# Patient Record
Sex: FEMALE | Race: OTHER
[De-identification: names, ages, dates, MRNs, and addresses within clinical notes are randomized per-mention and may not be internally consistent; named-entity substitution may affect disease eponyms.]

---

## 2021-03-27 ENCOUNTER — HOSPITAL ENCOUNTER (EMERGENCY)
Dept: HOSPITAL 8 - ED | Age: 51
Discharge: HOME | End: 2021-03-27
Payer: MEDICAID

## 2021-03-27 VITALS — HEIGHT: 63 IN | WEIGHT: 268.96 LBS | BODY MASS INDEX: 47.66 KG/M2

## 2021-03-27 VITALS — SYSTOLIC BLOOD PRESSURE: 148 MMHG | DIASTOLIC BLOOD PRESSURE: 101 MMHG

## 2021-03-27 DIAGNOSIS — Z72.9: ICD-10-CM

## 2021-03-27 DIAGNOSIS — F15.10: Primary | ICD-10-CM

## 2021-03-27 DIAGNOSIS — R07.89: ICD-10-CM

## 2021-03-27 DIAGNOSIS — E11.9: ICD-10-CM

## 2021-03-27 DIAGNOSIS — R41.82: ICD-10-CM

## 2021-03-27 DIAGNOSIS — I10: ICD-10-CM

## 2021-03-27 DIAGNOSIS — F12.10: ICD-10-CM

## 2021-03-27 LAB
ALBUMIN SERPL-MCNC: 3.5 G/DL (ref 3.4–5)
ALP SERPL-CCNC: 76 U/L (ref 45–117)
ALT SERPL-CCNC: 31 U/L (ref 12–78)
ANION GAP SERPL CALC-SCNC: 6 MMOL/L (ref 5–15)
BASOPHILS # BLD AUTO: 0.1 X10^3/UL (ref 0–0.1)
BASOPHILS NFR BLD AUTO: 1 % (ref 0–1)
BILIRUB SERPL-MCNC: 0.3 MG/DL (ref 0.2–1)
CALCIUM SERPL-MCNC: 8.4 MG/DL (ref 8.5–10.1)
CHLORIDE SERPL-SCNC: 108 MMOL/L (ref 98–107)
CREAT SERPL-MCNC: 1.23 MG/DL (ref 0.55–1.02)
EOSINOPHIL # BLD AUTO: 0.3 X10^3/UL (ref 0–0.4)
EOSINOPHIL NFR BLD AUTO: 3 % (ref 1–7)
ERYTHROCYTE [DISTWIDTH] IN BLOOD BY AUTOMATED COUNT: 14 % (ref 9.6–15.2)
LYMPHOCYTES # BLD AUTO: 3.4 X10^3/UL (ref 1–3.4)
LYMPHOCYTES NFR BLD AUTO: 32 % (ref 22–44)
MCH RBC QN AUTO: 30.6 PG (ref 27–34.8)
MCHC RBC AUTO-ENTMCNC: 33.6 G/DL (ref 32.4–35.8)
MD: NO
MONOCYTES # BLD AUTO: 0.9 X10^3/UL (ref 0.2–0.8)
MONOCYTES NFR BLD AUTO: 8 % (ref 2–9)
NEUTROPHILS # BLD AUTO: 5.9 X10^3/UL (ref 1.8–6.8)
NEUTROPHILS NFR BLD AUTO: 56 % (ref 42–75)
PLATELET # BLD AUTO: 234 X10^3/UL (ref 130–400)
PMV BLD AUTO: 10.9 FL (ref 7.4–10.4)
PROT SERPL-MCNC: 7.6 G/DL (ref 6.4–8.2)
RBC # BLD AUTO: 4.56 X10^6/UL (ref 3.82–5.3)
TROPONIN I SERPL-MCNC: < 0.015 NG/ML (ref 0–0.04)

## 2021-03-27 PROCEDURE — 84484 ASSAY OF TROPONIN QUANT: CPT

## 2021-03-27 PROCEDURE — 96360 HYDRATION IV INFUSION INIT: CPT

## 2021-03-27 PROCEDURE — 93005 ELECTROCARDIOGRAM TRACING: CPT

## 2021-03-27 PROCEDURE — 71045 X-RAY EXAM CHEST 1 VIEW: CPT

## 2021-03-27 PROCEDURE — 96361 HYDRATE IV INFUSION ADD-ON: CPT

## 2021-03-27 PROCEDURE — 99285 EMERGENCY DEPT VISIT HI MDM: CPT

## 2021-03-27 PROCEDURE — 82962 GLUCOSE BLOOD TEST: CPT

## 2021-03-27 PROCEDURE — 85025 COMPLETE CBC W/AUTO DIFF WBC: CPT

## 2021-03-27 PROCEDURE — G0480 DRUG TEST DEF 1-7 CLASSES: HCPCS

## 2021-03-27 PROCEDURE — 36415 COLL VENOUS BLD VENIPUNCTURE: CPT

## 2021-03-27 PROCEDURE — 80320 DRUG SCREEN QUANTALCOHOLS: CPT

## 2021-03-27 PROCEDURE — 70450 CT HEAD/BRAIN W/O DYE: CPT

## 2021-03-27 PROCEDURE — 80307 DRUG TEST PRSMV CHEM ANLYZR: CPT

## 2021-03-27 PROCEDURE — 80053 COMPREHEN METABOLIC PANEL: CPT

## 2021-03-27 NOTE — NUR
PT SITTING UPRIGHT ON GURNEY, RESTING COMFORTABLY WITH EYES CLOSED. NORA, SERA. 
PT UNABLE TO PROVIDE URINE SAMPLE AT THIS TIME. PT DENIES ANY NEEDS. CALL LIGHT 
AND BELONGINGS WITHIN REACH.

## 2021-03-27 NOTE — NUR
URINE SAMPLE PROVIDED. SAMPLE WALKED TO LAB. PT DENIES ANY ADDITIONAL NEEDS. 
ERP AT BEDSIDE FOR RE-EVAL. WILL OCNTINUE TO MONITOR.

## 2021-03-27 NOTE — NUR
Patient given discharge instructions and they have confirmed that they 
understand the instructions.  Patient ambulatory with steady gait. Pt to family 
vehicle via wheelchair.

## 2021-03-27 NOTE — NUR
PURE WICK PLACED PER PT REQUEST. PT DENIES ANY ADDITIONAL NEEDS AT THIS TIME. 
CALL LIGHT AND BELONGINGS WITHIN REACH.

## 2021-04-27 ENCOUNTER — HOSPITAL ENCOUNTER (OUTPATIENT)
Dept: CARDIOLOGY | Facility: MEDICAL CENTER | Age: 51
End: 2021-04-27
Attending: FAMILY MEDICINE
Payer: MEDICAID

## 2021-04-27 ENCOUNTER — HOSPITAL ENCOUNTER (OUTPATIENT)
Dept: RADIOLOGY | Facility: MEDICAL CENTER | Age: 51
End: 2021-04-27
Attending: FAMILY MEDICINE
Payer: MEDICAID

## 2021-04-27 DIAGNOSIS — R47.81 SLURRED SPEECH: ICD-10-CM

## 2021-04-27 DIAGNOSIS — H53.8 BLURRED VISION: ICD-10-CM

## 2021-04-27 LAB
LV EJECT FRACT  99904: 65
LV EJECT FRACT MOD 2C 99903: 57.44
LV EJECT FRACT MOD 4C 99902: 70.66
LV EJECT FRACT MOD BP 99901: 65.01

## 2021-04-27 PROCEDURE — 93880 EXTRACRANIAL BILAT STUDY: CPT | Mod: 26 | Performed by: INTERNAL MEDICINE

## 2021-04-27 PROCEDURE — 93880 EXTRACRANIAL BILAT STUDY: CPT

## 2021-04-27 PROCEDURE — 93306 TTE W/DOPPLER COMPLETE: CPT | Mod: 26 | Performed by: INTERNAL MEDICINE

## 2021-04-27 PROCEDURE — 93306 TTE W/DOPPLER COMPLETE: CPT

## 2022-04-27 ENCOUNTER — OFFICE VISIT (OUTPATIENT)
Dept: MEDICAL GROUP | Facility: OTHER | Age: 52
End: 2022-04-27
Payer: COMMERCIAL

## 2022-04-27 VITALS
OXYGEN SATURATION: 96 % | DIASTOLIC BLOOD PRESSURE: 90 MMHG | TEMPERATURE: 97.6 F | SYSTOLIC BLOOD PRESSURE: 140 MMHG | BODY MASS INDEX: 50.61 KG/M2 | HEART RATE: 76 BPM | WEIGHT: 275 LBS | HEIGHT: 62 IN

## 2022-04-27 DIAGNOSIS — K13.79 MOUTH PAIN: ICD-10-CM

## 2022-04-27 DIAGNOSIS — K02.9 TOOTH CARIES: ICD-10-CM

## 2022-04-27 DIAGNOSIS — G45.9 TIA (TRANSIENT ISCHEMIC ATTACK): ICD-10-CM

## 2022-04-27 DIAGNOSIS — I63.9 STROKE DETERMINED BY CLINICAL ASSESSMENT (HCC): ICD-10-CM

## 2022-04-27 DIAGNOSIS — Z00.00 PREVENTATIVE HEALTH CARE: ICD-10-CM

## 2022-04-27 PROCEDURE — 99214 OFFICE O/P EST MOD 30 MIN: CPT | Performed by: FAMILY MEDICINE

## 2022-04-27 RX ORDER — LISINOPRIL 40 MG/1
TABLET ORAL
COMMUNITY
Start: 2022-04-21 | End: 2022-07-20

## 2022-04-27 RX ORDER — DOXAZOSIN MESYLATE 1 MG/1
1 TABLET ORAL DAILY
COMMUNITY
Start: 2022-03-22 | End: 2022-08-15 | Stop reason: SDUPTHER

## 2022-04-27 RX ORDER — AMLODIPINE BESYLATE 10 MG/1
10 TABLET ORAL
COMMUNITY
Start: 2022-03-22 | End: 2022-08-15 | Stop reason: SDUPTHER

## 2022-04-27 RX ORDER — METFORMIN HYDROCHLORIDE 500 MG/1
500 TABLET, EXTENDED RELEASE ORAL DAILY
COMMUNITY
Start: 2022-03-22 | End: 2022-08-15 | Stop reason: SDUPTHER

## 2022-04-27 RX ORDER — AMOXICILLIN 500 MG/1
500 CAPSULE ORAL 3 TIMES DAILY
Qty: 30 CAPSULE | Refills: 1 | Status: SHIPPED | OUTPATIENT
Start: 2022-04-27 | End: 2022-06-23

## 2022-04-27 ASSESSMENT — ENCOUNTER SYMPTOMS
TINGLING: 0
CHILLS: 0
DIARRHEA: 0
SHORTNESS OF BREATH: 0
FEVER: 0
PALPITATIONS: 0
VOMITING: 0
COUGH: 0
HEARTBURN: 0
FOCAL WEAKNESS: 0
NAUSEA: 0
EYE REDNESS: 0
BRUISES/BLEEDS EASILY: 0
SORE THROAT: 0

## 2022-04-27 ASSESSMENT — PATIENT HEALTH QUESTIONNAIRE - PHQ9: CLINICAL INTERPRETATION OF PHQ2 SCORE: 0

## 2022-04-27 NOTE — ASSESSMENT & PLAN NOTE
Patient will get her hep B vaccine her pneumococcal vaccine her COVID booster.  Will advise he also states she needs MMR and chickenpox as she does not think she has had chickenpox in the past.  She also needs a follow-up with provider for mammogram and Pap smear

## 2022-04-27 NOTE — ASSESSMENT & PLAN NOTE
Exam shows caries almost every tooth.  Tooth in question is broken.  Root is exposed.  Teeth surrounding are loose.  No abscess that I could palpate.  Gum was not tender.  No redness.  Will prescribe amoxicillin 500 mg 3 times daily.  Antibiotic side effects discussed with patient.  Tolerated in the past.  No known allergies.  Gave her 30 enough for 10 days and 1 refill because her appointments not till June in case her pain comes back.

## 2022-04-27 NOTE — ASSESSMENT & PLAN NOTE
MRI brain as above.  Smokes marijuana and occasional meth.  Recommend that she stop this practice.

## 2022-04-27 NOTE — PROGRESS NOTES
Subjective:   CC:   Chief Complaint   Patient presents with   • Oral Swelling       HPI: Isa is a 51 y.o. female who presents today for the following problems:    Problem   Mouth Pain    Isa is here for mouth pain.  Left incisor front.  Broken off and she is getting gum and tooth pain.  She does not have appoint with the dentist until June.  In the past amoxicillin has helped her.  She is posterior all of her teeth pulled and get dentures.  History of meth use.     Tia (Transient Ischemic Attack)    History of TIA last fall.  CT head negative ultrasound carotids negative was given MRI order a brain by Dr. Ybarra but she did not ever get called.  She would like to do this test.     Stroke Determined By Clinical Assessment ([facility])    See discussion above     Preventative Health Care    Patient will get her hep B vaccine her pneumococcal vaccine her COVID booster.  Will advise he also states she needs MMR and chickenpox as she does not think she has had chickenpox in the past.  She also needs a follow-up with provider for mammogram and Pap smear         Current Outpatient Medications   Medication Sig Dispense Refill   • amLODIPine (NORVASC) 10 MG Tab Take 10 mg by mouth every day.     • doxazosin (CARDURA) 1 MG Tab Take 1 mg by mouth every day.     • lisinopril (PRINIVIL) 40 MG tablet      • metFORMIN ER (GLUCOPHAGE XR) 500 MG TABLET SR 24 HR Take 500 mg by mouth every day.     • amoxicillin (AMOXIL) 500 MG Cap Take 1 Capsule by mouth 3 times a day. 30 Capsule 1   • levothyroxine (SYNTHROID) 150 MCG TABS Take 1 Tab by mouth Every morning on an empty stomach. 30 Tab 3   • amlodipine (NORVASC) 5 MG TABS Take 1 Tab by mouth every day. (Patient not taking: Reported on 4/27/2022) 30 Tab 11   • metoprolol (LOPRESSOR) 25 MG TABS Take 1 Tab by mouth 2 Times a Day. (Patient not taking: Reported on 4/27/2022) 60 Tab 11   • silver sulfADIAZINE (SILVADENE) 1 % CREA Apply a thin layer to the affected area twice per day. (Patient  "not taking: Reported on 4/27/2022) 15 g 0   • tramadol (ULTRAM) 50 MG TABS Take 1-2 Tabs by mouth every four hours as needed. (Patient not taking: Reported on 4/27/2022) 30 Tab 0     No current facility-administered medications for this visit.       Social History     Tobacco Use   • Smoking status: Current Every Day Smoker   • Tobacco comment: rarely 1cigarette every 3 weeks   Substance Use Topics   • Alcohol use: No     Comment: quit 2009   • Drug use: Yes     Comment: quit 4/10 meth sober x 1 yr       Review of Systems   Constitutional: Negative for chills and fever.   HENT: Negative for sore throat.    Eyes: Negative for redness.   Respiratory: Negative for cough and shortness of breath.    Cardiovascular: Negative for chest pain and palpitations.   Gastrointestinal: Negative for diarrhea, heartburn, nausea and vomiting.   Skin: Negative for rash.   Neurological: Negative for tingling and focal weakness.   Endo/Heme/Allergies: Does not bruise/bleed easily.       Objective:     Vitals:    04/27/22 0812   BP: 140/90   BP Location: Right arm   Patient Position: Sitting   Pulse: 76   Temp: 36.4 °C (97.6 °F)   SpO2: 96%   Weight: 125 kg (275 lb)   Height: 1.575 m (5' 2\")     Body mass index is 50.3 kg/m².     Physical Exam:   Gen: Well developed, well nourished in no acute distress.   Skin: Pink, warm, and dry  HEENT: conjunctiva non-injected, sclera non-icteric. EOMs intact.   No facial tenderness  Oral mucous membranes pink and moist with no lesions.  Neck: Supple, trachea midline. No adenopathy or masses in the neck or supraclavicular regions.  Ext: no edema, color normal, vascularity normal, temperature normal  Alert and oriented Eye contact is good, speech goal directed, affect calm  Neuro no specific deficits.  Prior had slurred speech her speech is normal today.  Prior had blurred vision but her vision appears normal to her today.    Assessment & Plan:   Mouth pain  Exam shows caries almost every tooth.  Tooth " in question is broken.  Root is exposed.  Teeth surrounding are loose.  No abscess that I could palpate.  Gum was not tender.  No redness.  Will prescribe amoxicillin 500 mg 3 times daily.  Antibiotic side effects discussed with patient.  Tolerated in the past.  No known allergies.  Gave her 30 enough for 10 days and 1 refill because her appointments not till June in case her pain comes back.    TIA (transient ischemic attack)  Eye doctor told her that she had a stroke.  Will order MRI scan.  Brain without contrast.    Stroke determined by clinical assessment ([facility])  MRI brain as above.  Smokes marijuana and occasional meth.  Recommend that she stop this practice.    Preventative health care  Patient will get her hep B vaccine her pneumococcal vaccine her COVID booster.  Will advise he also states she needs MMR and chickenpox as she does not think she has had chickenpox in the past.  She also needs a follow-up with provider for mammogram and Pap smear      Followup: Return if symptoms worsen or fail to improve.    Taiwo Malone M.D.    Please note that this dictation was created using voice recognition software. I have made every reasonable attempt to correct obvious errors, but I expect that there are errors of grammar and possibly content that I did not discover before finalizing the note.

## 2022-06-23 ENCOUNTER — OFFICE VISIT (OUTPATIENT)
Dept: MEDICAL GROUP | Facility: OTHER | Age: 52
End: 2022-06-23
Payer: COMMERCIAL

## 2022-06-23 VITALS
SYSTOLIC BLOOD PRESSURE: 110 MMHG | HEART RATE: 77 BPM | BODY MASS INDEX: 49.39 KG/M2 | RESPIRATION RATE: 16 BRPM | OXYGEN SATURATION: 97 % | DIASTOLIC BLOOD PRESSURE: 78 MMHG | TEMPERATURE: 96.9 F | HEIGHT: 62 IN | WEIGHT: 268.4 LBS

## 2022-06-23 DIAGNOSIS — E11.21 TYPE 2 DIABETES MELLITUS WITH NEPHROPATHY (HCC): ICD-10-CM

## 2022-06-23 DIAGNOSIS — I10 PRIMARY HYPERTENSION: ICD-10-CM

## 2022-06-23 DIAGNOSIS — M25.562 ACUTE PAIN OF LEFT KNEE: ICD-10-CM

## 2022-06-23 DIAGNOSIS — E03.9 HYPOTHYROIDISM, UNSPECIFIED TYPE: ICD-10-CM

## 2022-06-23 PROBLEM — R42 VERTIGO: Status: ACTIVE | Noted: 2018-08-02

## 2022-06-23 PROBLEM — F19.10 SUBSTANCE ABUSE (HCC): Status: ACTIVE | Noted: 2021-04-19

## 2022-06-23 PROBLEM — S40.852A SUPERFICIAL FOREIGN BODY OF LEFT UPPER ARM: Status: ACTIVE | Noted: 2020-08-11

## 2022-06-23 PROBLEM — B35.1 ONYCHOMYCOSIS: Status: ACTIVE | Noted: 2017-07-20

## 2022-06-23 PROBLEM — H53.8 BLURRING OF VISUAL IMAGE: Status: ACTIVE | Noted: 2021-04-19

## 2022-06-23 PROBLEM — N18.30 CHRONIC KIDNEY DISEASE, STAGE III (MODERATE): Status: ACTIVE | Noted: 2018-05-03

## 2022-06-23 PROBLEM — F41.8 ANXIETY ASSOCIATED WITH DEPRESSION: Status: ACTIVE | Noted: 2018-05-03

## 2022-06-23 PROBLEM — K02.9 DENTAL CARIES: Status: ACTIVE | Noted: 2017-07-28

## 2022-06-23 PROBLEM — R73.03 PREDIABETES: Status: ACTIVE | Noted: 2018-05-03

## 2022-06-23 PROCEDURE — 99214 OFFICE O/P EST MOD 30 MIN: CPT | Performed by: FAMILY MEDICINE

## 2022-06-23 ASSESSMENT — PAIN SCALES - GENERAL: PAINLEVEL: 4=SLIGHT-MODERATE PAIN

## 2022-06-23 NOTE — ASSESSMENT & PLAN NOTE
Tylenol 500 m tabs three times daily.   PT ordered for knee pain.    Recheck with Dr Bender 6-8 weeks.

## 2022-06-23 NOTE — PROGRESS NOTES
[facility]     PATIENT ID:  NAME:  Isa Espinoza  MRN:               [id number]  YOB: 1970    Date: 10:29 AM      CC:  Left knee pain      HPI: Isa Espinoza is a 51 y.o. female who presented with one month of knee pain and for follow-up on several chronic issues including HTN, Diabetes, and thyroid.     Problem   Acute Pain of Left Knee    She has had left knee pain for about a month.  She does not recall what happened to it; no known trauma or injury reported.  Has not taken anything for this.  Has pain with turning or twisting at knee.  No prior knee surgery.      Blurring of Visual Image   Substance Abuse (Hcc)   Hypothyroidism    Takes thyroid daily.  No recent labs.      Superficial Foreign Body of Left Upper Arm   Type 2 Diabetes Mellitus With Nephropathy (Hcc)    Has had Diabetes for quite a while.  Takes Metformin ER once daily.  No recent labs.      Vertigo   Anxiety Associated With Depression   Chronic Kidney Disease, Stage III (Moderate) ([facility])   Prediabetes   Dental Caries   Onychomycosis   Preventative Health Care    Patient will get her hep B vaccine her pneumococcal vaccine her COVID booster.  Will advise he also states she needs MMR and chickenpox as she does not think she has had chickenpox in the past.  She also needs a follow-up with provider for mammogram and Pap smear     Htn (Hypertension)    Taking Lisinopril, Amlodipine and Doxazosin for her blood pressure.      Thyroid Disorder (Resolved)       REVIEW OF SYSTEMS:   Ten systems reviewed and were negative except as noted in the HPI.                PROBLEM LIST  Patient Active Problem List   Diagnosis   • Hypokalemia   • HTN (hypertension)   • Medical non-compliance   • Mouth pain   • TIA (transient ischemic attack)   • Stroke determined by clinical assessment ([facility])   • Preventative health care   • Anxiety associated with depression   • Blurring of visual image   • Chronic kidney disease, stage III (moderate) ([facility])   •  "Dental caries   • Hypothyroidism   • Onychomycosis   • Prediabetes   • Substance abuse (HCC)   • Superficial foreign body of left upper arm   • Type 2 diabetes mellitus with nephropathy (HCC)   • Vertigo   • Acute pain of left knee        PAST SURGICAL HISTORY:  History reviewed. No pertinent surgical history.    FAMILY HISTORY:  History reviewed. No pertinent family history.    SOCIAL HISTORY:   Social History     Tobacco Use   • Smoking status: Current Every Day Smoker     Packs/day: 0.25     Years: 29.00     Pack years: 7.25     Types: Cigarettes   • Smokeless tobacco: Former User   • Tobacco comment: tried it chewing tobacco but never continued use   Substance Use Topics   • Alcohol use: Yes     Comment: on special occasions       ALLERGIES:  Allergies   Allergen Reactions   • No Known Drug Allergy        OUTPATIENT MEDICATIONS:    Current Outpatient Medications:   •  amLODIPine (NORVASC) 10 MG Tab, Take 10 mg by mouth every day., Disp: , Rfl:   •  doxazosin (CARDURA) 1 MG Tab, Take 1 mg by mouth every day., Disp: , Rfl:   •  lisinopril (PRINIVIL) 40 MG tablet, , Disp: , Rfl:   •  metFORMIN ER (GLUCOPHAGE XR) 500 MG TABLET SR 24 HR, Take 500 mg by mouth every day., Disp: , Rfl:   •  levothyroxine (SYNTHROID) 150 MCG TABS, Take 1 Tab by mouth Every morning on an empty stomach., Disp: 30 Tab, Rfl: 3    PHYSICAL EXAM:  Vitals:    06/23/22 0937   BP: 110/78   BP Location: Right arm   Patient Position: Sitting   BP Cuff Size: Large adult   Pulse: 77   Resp: 16   Temp: 36.1 °C (96.9 °F)   TempSrc: Temporal   SpO2: 97%   Weight: 122 kg (268 lb 6.4 oz)   Height: 1.575 m (5' 2\")       General: Pt resting in NAD, cooperative; overweight.   Extremities:  Full range of motion.  Musculoskeletal: No swelling or obvious effusion noted; no erythema; she has tenderness inferior to patellar area and medial; no crepitus with PROM. Lachman's sign absent; has tenderness with stressing medial collateral ligament and none with " stressing lateral collateral ligaments.    CNS:  Muscle tone is normal. No gross focal neurologic deficits      ASSESSMENT/PLAN:   51 y.o. female     Problem List Items Addressed This Visit     Acute pain of left knee     Tylenol 500 m tabs three times daily.   PT ordered for knee pain.    Recheck with Dr Bender 6-8 weeks.             Relevant Orders    Referral to Physical Therapy    HTN (hypertension)     BP is controlled.   Continue current medication regimen.   Labs ordered.            Hypothyroidism     Labs ordered: TSH, Free T4.    Continue daily thyroid hormone.            Type 2 diabetes mellitus with nephropathy (HCC)     Labs ordered.  Ophthalmology referral placed.   Continue Metformin   Mg daily for now.                  Eduin Gil MD  [facility]

## 2022-06-23 NOTE — PATIENT INSTRUCTIONS
Tylenol Extra Strength (Acetaminophen) 500 mg: take 2 tabs three times a day for pain.    Physical therapy ordered.     Recheck about 2 months Dr Bender, Ph# [phone]

## 2022-07-09 LAB — HBA1C MFR BLD: 6.7 % (ref 0–5.6)

## 2022-07-20 RX ORDER — LISINOPRIL 40 MG/1
TABLET ORAL
Qty: 90 TABLET | Refills: 1 | Status: SHIPPED | OUTPATIENT
Start: 2022-07-20 | End: 2022-08-15 | Stop reason: SDUPTHER

## 2022-08-15 ENCOUNTER — HOSPITAL ENCOUNTER (OUTPATIENT)
Dept: RADIOLOGY | Facility: MEDICAL CENTER | Age: 52
End: 2022-08-15
Attending: FAMILY MEDICINE
Payer: COMMERCIAL

## 2022-08-15 DIAGNOSIS — I63.9 CEREBROVASCULAR ACCIDENT (CVA), UNSPECIFIED MECHANISM (HCC): ICD-10-CM

## 2022-08-15 DIAGNOSIS — I63.9 STROKE DETERMINED BY CLINICAL ASSESSMENT (HCC): ICD-10-CM

## 2022-08-15 PROCEDURE — 70551 MRI BRAIN STEM W/O DYE: CPT

## 2022-08-15 RX ORDER — METFORMIN HYDROCHLORIDE 500 MG/1
500 TABLET, EXTENDED RELEASE ORAL DAILY
Qty: 30 TABLET | Refills: 0 | Status: SHIPPED | OUTPATIENT
Start: 2022-08-15 | End: 2023-02-16 | Stop reason: SDUPTHER

## 2022-08-15 RX ORDER — AMLODIPINE BESYLATE 10 MG/1
10 TABLET ORAL
Qty: 30 TABLET | Refills: 0 | Status: SHIPPED | OUTPATIENT
Start: 2022-08-15 | End: 2023-02-16

## 2022-08-15 RX ORDER — LISINOPRIL 40 MG/1
40 TABLET ORAL DAILY
Qty: 30 TABLET | Refills: 0 | Status: SHIPPED | OUTPATIENT
Start: 2022-08-15 | End: 2023-02-16

## 2022-08-15 RX ORDER — DOXAZOSIN MESYLATE 1 MG/1
1 TABLET ORAL DAILY
Qty: 30 TABLET | Refills: 0 | Status: SHIPPED | OUTPATIENT
Start: 2022-08-15 | End: 2023-03-19

## 2022-08-15 RX ORDER — LEVOTHYROXINE SODIUM 0.15 MG/1
150 TABLET ORAL
Qty: 30 TABLET | Refills: 0 | Status: SHIPPED | OUTPATIENT
Start: 2022-08-15 | End: 2023-02-16 | Stop reason: SDUPTHER

## 2022-08-16 NOTE — TELEPHONE ENCOUNTER
Spoke with patient gave her results regarding her MRI. She stated she will schedule appointment with provider to follow up for labs and long term medications.

## 2022-12-15 ENCOUNTER — APPOINTMENT (OUTPATIENT)
Dept: INTERNAL MEDICINE | Facility: OTHER | Age: 52
End: 2022-12-15
Payer: COMMERCIAL

## 2022-12-29 ENCOUNTER — APPOINTMENT (OUTPATIENT)
Dept: INTERNAL MEDICINE | Facility: OTHER | Age: 52
End: 2022-12-29
Payer: COMMERCIAL

## 2023-01-10 PROBLEM — R42 VERTIGO: Status: RESOLVED | Noted: 2018-08-02 | Resolved: 2023-01-10

## 2023-01-10 PROBLEM — B35.1 ONYCHOMYCOSIS: Status: RESOLVED | Noted: 2017-07-20 | Resolved: 2023-01-10

## 2023-01-10 PROBLEM — S40.852A SUPERFICIAL FOREIGN BODY OF LEFT UPPER ARM: Status: RESOLVED | Noted: 2020-08-11 | Resolved: 2023-01-10

## 2023-01-10 PROBLEM — K13.79 MOUTH PAIN: Status: RESOLVED | Noted: 2022-04-27 | Resolved: 2023-01-10

## 2023-01-10 PROBLEM — F41.8 ANXIETY ASSOCIATED WITH DEPRESSION: Status: RESOLVED | Noted: 2018-05-03 | Resolved: 2023-01-10

## 2023-01-10 PROBLEM — I63.9 STROKE DETERMINED BY CLINICAL ASSESSMENT (HCC): Status: RESOLVED | Noted: 2022-04-27 | Resolved: 2023-01-10

## 2023-01-10 PROBLEM — M25.562 ACUTE PAIN OF LEFT KNEE: Status: RESOLVED | Noted: 2022-06-23 | Resolved: 2023-01-10

## 2023-01-10 PROBLEM — E66.9 OBESITY WITH BODY MASS INDEX 30 OR GREATER: Status: ACTIVE | Noted: 2017-07-13

## 2023-01-10 PROBLEM — H53.8 BLURRING OF VISUAL IMAGE: Status: RESOLVED | Noted: 2021-04-19 | Resolved: 2023-01-10

## 2023-01-10 PROBLEM — K02.9 DENTAL CARIES: Status: RESOLVED | Noted: 2017-07-28 | Resolved: 2023-01-10

## 2023-01-25 ENCOUNTER — APPOINTMENT (OUTPATIENT)
Dept: INTERNAL MEDICINE | Facility: OTHER | Age: 53
End: 2023-01-25
Payer: COMMERCIAL

## 2023-02-16 ENCOUNTER — OFFICE VISIT (OUTPATIENT)
Dept: INTERNAL MEDICINE | Facility: OTHER | Age: 53
End: 2023-02-16
Payer: COMMERCIAL

## 2023-02-16 VITALS
HEIGHT: 63 IN | TEMPERATURE: 97.4 F | SYSTOLIC BLOOD PRESSURE: 166 MMHG | WEIGHT: 263.4 LBS | BODY MASS INDEX: 46.67 KG/M2 | HEART RATE: 72 BPM | DIASTOLIC BLOOD PRESSURE: 94 MMHG | OXYGEN SATURATION: 97 %

## 2023-02-16 DIAGNOSIS — I10 PRIMARY HYPERTENSION: ICD-10-CM

## 2023-02-16 DIAGNOSIS — Z12.11 SCREENING FOR COLON CANCER: ICD-10-CM

## 2023-02-16 DIAGNOSIS — R06.83 SNORING: ICD-10-CM

## 2023-02-16 DIAGNOSIS — N28.9 RENAL INSUFFICIENCY: ICD-10-CM

## 2023-02-16 DIAGNOSIS — Z12.31 ENCOUNTER FOR SCREENING MAMMOGRAM FOR BREAST CANCER: ICD-10-CM

## 2023-02-16 DIAGNOSIS — E03.9 HYPOTHYROIDISM, UNSPECIFIED TYPE: ICD-10-CM

## 2023-02-16 DIAGNOSIS — I63.9 CEREBROVASCULAR ACCIDENT (CVA), UNSPECIFIED MECHANISM (HCC): ICD-10-CM

## 2023-02-16 DIAGNOSIS — R73.03 PREDIABETES: ICD-10-CM

## 2023-02-16 PROCEDURE — 99214 OFFICE O/P EST MOD 30 MIN: CPT | Performed by: INTERNAL MEDICINE

## 2023-02-16 RX ORDER — METFORMIN HYDROCHLORIDE 500 MG/1
500 TABLET, EXTENDED RELEASE ORAL DAILY
Qty: 30 TABLET | Refills: 0 | Status: SHIPPED | OUTPATIENT
Start: 2023-02-16 | End: 2023-03-19

## 2023-02-16 RX ORDER — AMLODIPINE BESYLATE 5 MG/1
5 TABLET ORAL DAILY
Qty: 30 TABLET | Refills: 1 | Status: SHIPPED | OUTPATIENT
Start: 2023-02-16 | End: 2023-03-22

## 2023-02-16 RX ORDER — ASPIRIN 81 MG/1
81 TABLET, CHEWABLE ORAL DAILY
Qty: 100 TABLET | Refills: 1 | Status: SHIPPED | OUTPATIENT
Start: 2023-02-16 | End: 2023-09-01 | Stop reason: SDUPTHER

## 2023-02-16 RX ORDER — LISINOPRIL 10 MG/1
10 TABLET ORAL DAILY
Qty: 30 TABLET | Refills: 1 | Status: SHIPPED | OUTPATIENT
Start: 2023-02-16 | End: 2023-03-22

## 2023-02-16 RX ORDER — LEVOTHYROXINE SODIUM 0.15 MG/1
150 TABLET ORAL
Qty: 30 TABLET | Refills: 0 | Status: SHIPPED | OUTPATIENT
Start: 2023-02-16 | End: 2023-03-24

## 2023-02-16 ASSESSMENT — FIBROSIS 4 INDEX: FIB4 SCORE: 0.69

## 2023-02-16 NOTE — PROGRESS NOTES
Chief Complaint   Patient presents with    New Patient     Re-est    Hypertension    Hypothyroidism    Diabetes    Medication Refill     Have not taken any medications for 4 months        HPI: Isa Espinoza is a 52 y.o. female with past medical history as below who presented to the clinic fto establish and for the following     *Hypertension - was on lisinopril 40 mg , doxazocin 1mg , amlodipine 10 mg, ran out of medications 2 months ago and has not been on any meds for blood pressure for the same duration. Has history of CVA. Denies any chest pain, palpitations. States that she feels short of breath now and then which she attributes to her weight. Has vision changes secondary to her CVA/other- seeing ophthalmologist.    *History of CVA with substance use at the time   -TIA like symptoms happened  - 2 years ago- when she had slurry speech, vision changes on one eye, was emotional, seen at [facility] with resolution of symptoms in a few hours with recommendation to follow up with \[facility]\"" clinic which she did not do at the time due to resolution of symptoms and then forgot about it. Patient was smoking cannabis and also methamphetamine at the time. Patient recently had an eye exam done at [facility] and was told that she has vision changes suggestive of a stroke for which an MRI brain was performed in August 2022 which confirmed the presence of a  old left temporal occipital stroke with chronic microvascular ischemic changes.   Patient currently states that she has worse vision on left eye, however doing well with glasses both for near and far sightedness ( 2 separate glasses) and still drives.   Echo cardiogram in April 2021 showed trace mR, AI and Trace TR     *Substance use disorder   -Quit methamphetamine 1 month ago because she needs dental procedures done which requires abstinence.   -Currently smoking marijuana every other day  -States that she smokes a few cigarettes a month    *Hypothyroidism    -TSH of 26 in 2015, has not taken any medications for 2 months, was on levothyroxine 150 mcg.   -States that she and constipation issues in the past which had briefly resolved and now she again as problems with constipation.  Weight has been up and down. Did not discuss regarding menstrual cycles on this visit.     *Prediabetes vs Type 2DM?  -Unable to find A1C in diabetic range on chart , both problems listed on chart . Keeping problem on chart for now.  - Aic of 6.7 in jUly 2022 , metformin 500 SR  - positive for nocturia, at least 6 times a night, urinary frequency not so much during the day.    *Renal insufficiency   -CKD stage 3 on chart , deleted problem as we do not have confirmation that it is chronic   -Seen on latest labs from 2021,GFR 46.6,  all normal prior to that on our EMR  -Nocturia, however denies any other urinary symptoms.         Diet- mostly home cooked meals however does eat greasy foods.   Open to get shingles and also colonoscopy     Family History   Problem Relation Age of Onset    Stroke Mother         Patient states taht mother was way older than when she or her sister had stroked    Cancer Mother         States that it was somewhere near stomach which had already spread and she went to hospice upon diagnosis    Stroke Father     Stroke Sister 45        Paralysed neck below    No Known Problems Paternal Aunt       Social History     Socioeconomic History    Marital status:    Tobacco Use    Smoking status: Some Days     Packs/day: 0.25     Years: 29.00     Pack years: 7.25     Types: Cigarettes    Smokeless tobacco: Former    Tobacco comments:     tried it chewing tobacco but never continued use   Vaping Use    Vaping Use: Never used   Substance and Sexual Activity    Alcohol use: Not Currently     Comment: on special occasions    Drug use: Yes     Types: Marijuana, Methamphetamines          Patient Active Problem List    Diagnosis Date Noted    Renal insufficiency 02/19/2023     CVA (cerebral vascular accident) ([facility]) 04/27/2022    Substance abuse ([facility]) 04/19/2021    Hypothyroidism 08/11/2020    Type 2 diabetes mellitus with nephropathy ([facility]) 08/11/2020    Prediabetes 05/03/2018    Obesity with body mass index 30 or greater 07/13/2017    HTN (hypertension) 04/17/2015       Current Outpatient Medications   Medication Sig Dispense Refill    amLODIPine (NORVASC) 5 MG Tab Take 1 Tablet by mouth every day. 30 Tablet 1    lisinopril (PRINIVIL) 10 MG Tab Take 1 Tablet by mouth every day. 30 Tablet 1    aspirin (ASA) 81 MG Chew Tab chewable tablet Chew 1 Tablet every day. 100 Tablet 1    levothyroxine (SYNTHROID) 150 MCG Tab Take 1 Tablet by mouth every morning on an empty stomach. 30 Tablet 0    metFORMIN ER (GLUCOPHAGE XR) 500 MG TABLET SR 24 HR Take 1 Tablet by mouth every day. 30 Tablet 0    doxazosin (CARDURA) 1 MG Tab Take 1 Tablet by mouth every day. (Patient not taking: Reported on 2/16/2023) 30 Tablet 0     No current facility-administered medications for this visit.       ROS: As per HPI. Additional pertinent systems as noted below.  Constitutional:  Negative for fever, chills   HENT:  Negative for ear pain, sore throat, runny nose   Eyes:  As in HPI  Cardiovascular:  Negative for chest pain, palpitations   Respiratory:  Negative for cough, sputum production, Positive for intermittent SOB  Gastrointestinal: Negative for abdominal pain, nausea, vomiting, , or blood in stools . Positive for constipation, Positive for diarrhea when she eats chilli  Genitourinary: Negative for dysuria, flank pain and hematuria. Positive for nocturia  Extremities: Negative for leg swelling   Neurologic: Negative for headaches, dizziness, seizures, weakness . Positive for blurry vision as in hpi  Endo/Heme/Allergies: Negative for polydipsia. Positive for nocturia      BP (!) 166/94 (BP Location: Left arm, Patient Position: Sitting, BP Cuff Size: Large adult)   Pulse 72   Temp 36.3 °C (97.4 °F) (Temporal)   Ht  "1.6 m (5' 3\")   Wt 119 kg (263 lb 6.4 oz)   SpO2 97%   BMI 46.66 kg/m²     Physical Exam   Constitutional:  Well developed, well nourished. Not in acute distress   HENT:  Normocephalic, Atraumatic, Oropharynx is pink and moist. No oral exudates, Nose normal. Poor oral dentition    Eyes:  EOMI, Conjunctiva normal, No discharge. PERRLA    Cardiovascular:  Regular rate and rhythm, No pedal edema, Intact distal pulses   Respiratory: Clear to auscultation bilaterally, No use of accessory muscles    Neurologic: Alert & oriented x 4,     Note: I have reviewed all pertinent labs and diagnostic tests associated with this visit with specific comments listed under the assessment and plan below    Assessment and Plan    1. Primary hypertension  - Advised patient to keep a log of blood pressures at home and bring on next visit in 1 week.   DASH diet, decrease salt, incorporating some exercise.   -I am not restarting all three meds at the same dose she was on previously, given that she was off of meds for 2 months. I am instead starting amlodipine and lisinopril at lower doses.   -Discontinuing doxazocin   -Follow up in 1 week with labs to up titrate meds  -May need sleep study- to be discussed on next visit.   -Restart levothyroxine as this may be a secondary cause contributing to hypertension.       - Lipid Profile; Future  - amLODIPine (NORVASC) 5 MG Tab; Take 1 Tablet by mouth every day.  Dispense: 30 Tablet; Refill: 1  - lisinopril (PRINIVIL) 10 MG Tab; Take 1 Tablet by mouth every day.  Dispense: 30 Tablet; Refill: 1  - CBC WITH DIFFERENTIAL; Future  - Comp Metabolic Panel; Future    2. Cerebrovascular accident (CVA), unspecified mechanism (HCC)  -May have been secondary to stimulant use at the time.   -However cannot rule out atherosclerotic causes given family history and MRI finding of Microvascular ischemia and given her risk factors including obesity, possible diabetes/prediabetes, hypertension, intermittent " cigarette smoking, hence will start aspirin 81 mg. Will wait for lipid panel, prior to starting statin    - Lipid Profile; Future  - aspirin (ASA) 81 MG Chew Tab chewable tablet; Chew 1 Tablet every day.  Dispense: 100 Tablet; Refill: 1  - Comp Metabolic Panel; Future    3. Hypothyroidism, unspecified type    - levothyroxine (SYNTHROID) 150 MCG Tab; Take 1 Tablet by mouth every morning on an empty stomach.  Dispense: 30 Tablet; Refill: 0  - TSH+FREE T4    4. Prediabetes  Do not find A1C confirming diabetes on chart- however problem listed   - Lipid Profile; Future  - metFORMIN ER (GLUCOPHAGE XR) 500 MG TABLET SR 24 HR; Take 1 Tablet by mouth every day.  Dispense: 30 Tablet; Refill: 0  - Comp Metabolic Panel; Future    5. Renal insufficiency  Unclear if chronic. Will wait for new labs  -protein creat ratio.    6. Snoring  Cbc to check for polycythemia to support diagnosis of Sleep apnea. Will check STOP BANG on next visit.   - CBC WITH DIFFERENTIAL; Future    7. Screening for colon cancer    - Referral to GI for Colonoscopy       8. Encounter for screening mammogram for breast cancer    - [id number]; Future       Followup: Return in about 1 week (around 2/23/2023).        Signed by: Kris White M.D.

## 2023-02-19 PROBLEM — I63.9 CVA (CEREBRAL VASCULAR ACCIDENT) (HCC): Status: ACTIVE | Noted: 2022-04-27

## 2023-02-19 PROBLEM — N28.9 RENAL INSUFFICIENCY: Status: ACTIVE | Noted: 2023-02-19

## 2023-02-21 NOTE — PROGRESS NOTES
No chief complaint on file.      HPI: Isa Espinoza is a 52 y.o. female with past medical history as below who presented to the clinic fto establish and for the following     *Hypertension - was on lisinopril 40 mg , doxazocin 1mg , amlodipine 10 mg, ran out of medications 2 months ago and has not been on any meds for blood pressure for the same duration. Has history of CVA. Denies any chest pain, palpitations. States that she feels short of breath now and then which she attributes to her weight. Has vision changes secondary to her CVA/other- seeing ophthalmologist.    *History of CVA with substance use at the time   -TIA like symptoms happened  - 2 years ago- when she had slurry speech, vision changes on one eye, was emotional, seen at [facility] with resolution of symptoms in a few hours with recommendation to follow up with \[facility]\"" clinic which she did not do at the time due to resolution of symptoms and then forgot about it. Patient was smoking cannabis and also methamphetamine at the time. Patient recently had an eye exam done at [facility] and was told that she has vision changes suggestive of a stroke for which an MRI brain was performed in August 2022 which confirmed the presence of a  old left temporal occipital stroke with chronic microvascular ischemic changes.   Patient currently states that she has worse vision on left eye, however doing well with glasses both for near and far sightedness ( 2 separate glasses) and still drives.   Echo cardiogram in April 2021 showed trace mR, AI and Trace TR     *Substance use disorder   -Quit methamphetamine 1 month ago because she needs dental procedures done which requires abstinence.   -Currently smoking marijuana every other day  -States that she smokes a few cigarettes a month    *Hypothyroidism   -TSH of 26 in 2015, has not taken any medications for 2 months, was on levothyroxine 150 mcg.   -States that she and constipation issues in the past which had  briefly resolved and now she again as problems with constipation.  Weight has been up and down. Did not discuss regarding menstrual cycles on this visit.     *Prediabetes vs Type 2DM?  -Unable to find A1C in diabetic range on chart , both problems listed on chart . Keeping problem on chart for now.  - Aic of 6.7 in jUly 2022 , metformin 500 SR  - positive for nocturia, at least 6 times a night, urinary frequency not so much during the day.    *Renal insufficiency   -CKD stage 3 on chart , deleted problem as we do not have confirmation that it is chronic   -Seen on latest labs from 2021,GFR 46.6,  all normal prior to that on our EMR  -Nocturia, however denies any other urinary symptoms.         Diet- mostly home cooked meals however does eat greasy foods.   Open to get shingles and also colonoscopy     Family History   Problem Relation Age of Onset    Stroke Mother         Patient states taht mother was way older than when she or her sister had stroked    Cancer Mother         States that it was somewhere near stomach which had already spread and she went to hospice upon diagnosis    Stroke Father     Stroke Sister 45        Paralysed neck below    No Known Problems Paternal Aunt       Social History     Socioeconomic History    Marital status:    Tobacco Use    Smoking status: Some Days     Packs/day: 0.25     Years: 29.00     Pack years: 7.25     Types: Cigarettes    Smokeless tobacco: Former    Tobacco comments:     tried it chewing tobacco but never continued use   Vaping Use    Vaping Use: Never used   Substance and Sexual Activity    Alcohol use: Not Currently     Comment: on special occasions    Drug use: Yes     Types: Marijuana, Methamphetamines          Patient Active Problem List    Diagnosis Date Noted    Renal insufficiency 02/19/2023    CVA (cerebral vascular accident) (HCC) 04/27/2022    Substance abuse ([facility]) 04/19/2021    Hypothyroidism 08/11/2020    Type 2 diabetes mellitus with nephropathy  (HCC) 08/11/2020    Prediabetes 05/03/2018    Obesity with body mass index 30 or greater 07/13/2017    HTN (hypertension) 04/17/2015       Current Outpatient Medications   Medication Sig Dispense Refill    amLODIPine (NORVASC) 5 MG Tab Take 1 Tablet by mouth every day. 30 Tablet 1    lisinopril (PRINIVIL) 10 MG Tab Take 1 Tablet by mouth every day. 30 Tablet 1    aspirin (ASA) 81 MG Chew Tab chewable tablet Chew 1 Tablet every day. 100 Tablet 1    levothyroxine (SYNTHROID) 150 MCG Tab Take 1 Tablet by mouth every morning on an empty stomach. 30 Tablet 0    metFORMIN ER (GLUCOPHAGE XR) 500 MG TABLET SR 24 HR Take 1 Tablet by mouth every day. 30 Tablet 0    doxazosin (CARDURA) 1 MG Tab Take 1 Tablet by mouth every day. (Patient not taking: Reported on 2/16/2023) 30 Tablet 0     No current facility-administered medications for this visit.       ROS: As per HPI. Additional pertinent systems as noted below.  Constitutional:  Negative for fever, chills   HENT:  Negative for ear pain, sore throat, runny nose   Eyes:  As in HPI  Cardiovascular:  Negative for chest pain, palpitations   Respiratory:  Negative for cough, sputum production, Positive for intermittent SOB  Gastrointestinal: Negative for abdominal pain, nausea, vomiting, , or blood in stools . Positive for constipation, Positive for diarrhea when she eats chilli  Genitourinary: Negative for dysuria, flank pain and hematuria. Positive for nocturia  Extremities: Negative for leg swelling   Neurologic: Negative for headaches, dizziness, seizures, weakness . Positive for blurry vision as in hpi  Endo/Heme/Allergies: Negative for polydipsia. Positive for nocturia      There were no vitals taken for this visit.    Physical Exam   Constitutional:  Well developed, well nourished. Not in acute distress   HENT:  Normocephalic, Atraumatic, Oropharynx is pink and moist. No oral exudates, Nose normal. Poor oral dentition    Eyes:  EOMI, Conjunctiva normal, No discharge. PERRLA     Cardiovascular:  Regular rate and rhythm, No pedal edema, Intact distal pulses   Respiratory: Clear to auscultation bilaterally, No use of accessory muscles    Neurologic: Alert & oriented x 4,     Note: I have reviewed all pertinent labs and diagnostic tests associated with this visit with specific comments listed under the assessment and plan below    Assessment and Plan    1. Primary hypertension  - Advised patient to keep a log of blood pressures at home and bring on next visit in 1 week.   DASH diet, decrease salt, incorporating some exercise.   -I am not restarting all three meds at the same dose she was on previously, given that she was off of meds for 2 months. I am instead starting amlodipine and lisinopril at lower doses.   -Discontinuing doxazocin   -Follow up in 1 week with labs to up titrate meds  -May need sleep study- to be discussed on next visit.   -Restart levothyroxine as this may be a secondary cause contributing to hypertension.       - Lipid Profile; Future  - amLODIPine (NORVASC) 5 MG Tab; Take 1 Tablet by mouth every day.  Dispense: 30 Tablet; Refill: 1  - lisinopril (PRINIVIL) 10 MG Tab; Take 1 Tablet by mouth every day.  Dispense: 30 Tablet; Refill: 1  - CBC WITH DIFFERENTIAL; Future  - Comp Metabolic Panel; Future    2. Cerebrovascular accident (CVA), unspecified mechanism (HCC)  -May have been secondary to stimulant use at the time.   -However cannot rule out atherosclerotic causes given family history and MRI finding of Microvascular ischemia and given her risk factors including obesity, possible diabetes/prediabetes, hypertension, intermittent cigarette smoking, hence will start aspirin 81 mg. Will wait for lipid panel, prior to starting statin    - Lipid Profile; Future  - aspirin (ASA) 81 MG Chew Tab chewable tablet; Chew 1 Tablet every day.  Dispense: 100 Tablet; Refill: 1  - Comp Metabolic Panel; Future    3. Hypothyroidism, unspecified type    - levothyroxine (SYNTHROID) 150 MCG  Tab; Take 1 Tablet by mouth every morning on an empty stomach.  Dispense: 30 Tablet; Refill: 0  - TSH+FREE T4    4. Prediabetes  Do not find A1C confirming diabetes on chart- however problem listed   - Lipid Profile; Future  - metFORMIN ER (GLUCOPHAGE XR) 500 MG TABLET SR 24 HR; Take 1 Tablet by mouth every day.  Dispense: 30 Tablet; Refill: 0  - Comp Metabolic Panel; Future    5. Renal insufficiency  Unclear if chronic. Will wait for new labs  -protein creat ratio.    6. Snoring  Cbc to check for polycythemia to support diagnosis of Sleep apnea. Will check STOP BANG on next visit.   - CBC WITH DIFFERENTIAL; Future    7. Screening for colon cancer    - Referral to GI for Colonoscopy       8. Encounter for screening mammogram for breast cancer    - [id number]; Future       Followup: No follow-ups on file.        Signed by: Kris White M.D.

## 2023-02-23 ENCOUNTER — APPOINTMENT (OUTPATIENT)
Dept: INTERNAL MEDICINE | Facility: OTHER | Age: 53
End: 2023-02-23
Payer: COMMERCIAL

## 2023-02-28 ENCOUNTER — APPOINTMENT (OUTPATIENT)
Dept: RADIOLOGY | Facility: MEDICAL CENTER | Age: 53
End: 2023-02-28
Attending: INTERNAL MEDICINE
Payer: COMMERCIAL

## 2023-03-02 ENCOUNTER — HOSPITAL ENCOUNTER (OUTPATIENT)
Dept: RADIOLOGY | Facility: MEDICAL CENTER | Age: 53
End: 2023-03-02
Attending: INTERNAL MEDICINE
Payer: COMMERCIAL

## 2023-03-02 DIAGNOSIS — Z12.31 ENCOUNTER FOR SCREENING MAMMOGRAM FOR BREAST CANCER: ICD-10-CM

## 2023-03-02 PROCEDURE — 77063 BREAST TOMOSYNTHESIS BI: CPT

## 2023-03-16 ENCOUNTER — OFFICE VISIT (OUTPATIENT)
Dept: INTERNAL MEDICINE | Facility: OTHER | Age: 53
End: 2023-03-16
Payer: COMMERCIAL

## 2023-03-16 VITALS
WEIGHT: 267.4 LBS | TEMPERATURE: 97.2 F | OXYGEN SATURATION: 97 % | BODY MASS INDEX: 47.38 KG/M2 | HEART RATE: 77 BPM | DIASTOLIC BLOOD PRESSURE: 89 MMHG | SYSTOLIC BLOOD PRESSURE: 139 MMHG | HEIGHT: 63 IN

## 2023-03-16 DIAGNOSIS — I63.9 CEREBROVASCULAR ACCIDENT (CVA), UNSPECIFIED MECHANISM (HCC): ICD-10-CM

## 2023-03-16 DIAGNOSIS — E11.21 TYPE 2 DIABETES MELLITUS WITH NEPHROPATHY (HCC): ICD-10-CM

## 2023-03-16 DIAGNOSIS — E03.9 HYPOTHYROIDISM, UNSPECIFIED TYPE: ICD-10-CM

## 2023-03-16 DIAGNOSIS — R23.2 HOT FLASHES: ICD-10-CM

## 2023-03-16 DIAGNOSIS — I10 PRIMARY HYPERTENSION: ICD-10-CM

## 2023-03-16 DIAGNOSIS — N18.31 STAGE 3A CHRONIC KIDNEY DISEASE (CKD): ICD-10-CM

## 2023-03-16 DIAGNOSIS — R74.01 TRANSAMINITIS: ICD-10-CM

## 2023-03-16 DIAGNOSIS — E87.20 METABOLIC ACIDOSIS: ICD-10-CM

## 2023-03-16 DIAGNOSIS — D75.1 ERYTHROCYTOSIS: ICD-10-CM

## 2023-03-16 DIAGNOSIS — E78.5 DYSLIPIDEMIA: ICD-10-CM

## 2023-03-16 LAB
HBA1C MFR BLD: 8 % (ref ?–5.8)
POCT INT CON NEG: NEGATIVE
POCT INT CON POS: POSITIVE

## 2023-03-16 PROCEDURE — 83036 HEMOGLOBIN GLYCOSYLATED A1C: CPT | Performed by: INTERNAL MEDICINE

## 2023-03-16 PROCEDURE — 99214 OFFICE O/P EST MOD 30 MIN: CPT | Performed by: INTERNAL MEDICINE

## 2023-03-16 RX ORDER — ASPIRIN 81 MG/1
81 TABLET, COATED ORAL DAILY
COMMUNITY
Start: 2023-02-16 | End: 2023-03-19

## 2023-03-16 ASSESSMENT — FIBROSIS 4 INDEX: FIB4 SCORE: 0.69

## 2023-03-16 NOTE — PROGRESS NOTES
Chief Complaint   Patient presents with    Follow-Up       HPI: Isa Espinoza is a 52 y.o. female with past medical history as below who presented to the clinic fto establish and for the following          *Hypertension -  Has history of CVA. Restarted lisinopril 10 mg and amlodipine 5 mg , whih she started about a month back.  Patient's blood pressures at home are now running at 130-150. Patient states that she has been having episodes of mild lightheadedness for past 2 weeks not associated with any other symptoms when getting up from lying down position. Patient has been trying to walk more with dog and also has cut down salt significantly. She has also been drinking less water. She feels that the feeling of lightheadedness has improved slightly over the past week. Denies any LOC.  Positive for palpitations which she states happens when she is sitting down watching Tv when she feels her heart skip a beat - just about 1 second , never more than that without any other associated symptoms and separate from the lightheadedness episode. This has been happening for the past 4 years or so,, happens once in 2-3 weeks. States that she feels short of breath now and then which she attributes to her weight. Has vision changes secondary to her CVA/other- seeing ophthalmologist.uses 2 sets of glasses , mostly uses them at night     *History of CVA with substance use at the time   -TIA like symptoms happened  - 2 years ago- when she had slurry speech, vision changes on one eye, was emotional, seen at [facility] with resolution of symptoms in a few hours with recommendation to follow up with [facility] clinic which she did not do at the time due to resolution of symptoms and then forgot about it. Patient was smoking cannabis and also methamphetamine at the time. Patient recently had an eye exam done at [facility] and was told that she has vision changes suggestive of a stroke for which an MRI brain was performed in August  2022 which confirmed the presence of a  old left temporal occipital stroke with chronic microvascular ischemic changes.   Patient currently states that she has worse vision on left eye, however doing well with glasses both for near and far sightedness ( 2 separate glasses) and still drives. No new vision changes.  Echo cardiogram in April 2021 showed trace mR, AI and Trace TR     *Substance use disorder   -Today Isa tells me again that she Quit methamphetamine 1 month ago . Dental procedure has not been scheduled yet  -Currently smoking marijuana every other day  -States that she smokes a few cigarettes a month    *Hypothyroidism   -TSH of 26 in 2015, has not taken any medications for 2 months, was on levothyroxine 150 mcg.   -States that she and constipation issues in the past which had briefly resolved and now she again as problems with constipation.  Weight has been up and down. States that her menstrual cycles are not irregular with her having no menstrual cycle for one month after one cycle after 3 months.  -Tsh was ordered on the last visit, however it has not been done.     *Type DM   - Aic of 6.7 in jUly 2022 , metformin 500 SR  - positive for nocturia, at least 6 times a night, urinary frequency not so much during the day.A1C today in clinic is 8    *CKD stage 3a   Recent lasbs confirming stage 3 CKD   -Nocturia, however denies any other urinary symptoms.    #DLD   -ASCVD 11.4 with ldl of  101, as per labs done on 03/13/2023  MRI does show chronic microvascular ischemic changes      #Hot flashes   -Not bothering her too much   She sleeps with her window open and carries around a fan everywhere.     #Metabolic acidosis  Mild anion gap of 14  -A1C of 8, fasting sugar elevated, however do not suspect DKA, not nauseous , tolerating diet well.   Patient currently not drinking any alcohol. No uremia, not on iron supplements, no signs of infection or ongoing ischemia     #Transaminitis   ALT of 32   No hep  screening on file, no abdominal symptoms except constipation     #Erythrocytosis   -oxygen saturation of 97%.   -Unclear if patient has sleep apnea. Unsure if she snores.   STOP BANG without that is intermediate, neck circumference not measured. Patient has not had a chance to ask son regarding snoring        Preventative health   Patient is open to getting shingles   Colonoscopy scheduled for next month         Family History   Problem Relation Age of Onset    Stroke Mother         Patient states taht mother was way older than when she or her sister had stroked    Cancer Mother         States that it was somewhere near stomach which had already spread and she went to hospice upon diagnosis    Stroke Father     Stroke Sister 45        Paralysed neck below    No Known Problems Paternal Aunt       Social History     Socioeconomic History    Marital status:    Tobacco Use    Smoking status: Some Days     Packs/day: 0.25     Years: 29.00     Pack years: 7.25     Types: Cigarettes    Smokeless tobacco: Former    Tobacco comments:     tried it chewing tobacco but never continued use   Vaping Use    Vaping Use: Never used   Substance and Sexual Activity    Alcohol use: Not Currently     Comment: on special occasions    Drug use: Yes     Types: Marijuana, Methamphetamines          Patient Active Problem List    Diagnosis Date Noted    Renal insufficiency 02/19/2023    CVA (cerebral vascular accident) (HCC) 04/27/2022    Substance abuse (HCC) 04/19/2021    Hypothyroidism 08/11/2020    Type 2 diabetes mellitus with nephropathy ([facility]) 08/11/2020    Prediabetes 05/03/2018    Obesity with body mass index 30 or greater 07/13/2017    HTN (hypertension) 04/17/2015       Current Outpatient Medications   Medication Sig Dispense Refill    ASPIRIN LOW DOSE 81 MG EC tablet Take 81 mg by mouth every day. CHEW AND SWALLOW      amLODIPine (NORVASC) 5 MG Tab Take 1 Tablet by mouth every day. 30 Tablet 1    lisinopril (PRINIVIL) 10 MG  "Tab Take 1 Tablet by mouth every day. 30 Tablet 1    aspirin (ASA) 81 MG Chew Tab chewable tablet Chew 1 Tablet every day. 100 Tablet 1    levothyroxine (SYNTHROID) 150 MCG Tab Take 1 Tablet by mouth every morning on an empty stomach. 30 Tablet 0    metFORMIN ER (GLUCOPHAGE XR) 500 MG TABLET SR 24 HR Take 1 Tablet by mouth every day. 30 Tablet 0    doxazosin (CARDURA) 1 MG Tab Take 1 Tablet by mouth every day. (Patient not taking: Reported on 2/16/2023) 30 Tablet 0     No current facility-administered medications for this visit.       ROS: As per HPI. Additional pertinent systems as noted below.  Constitutional:  Negative for fever, chills   HENT:  Negative for ear pain, sore throat, runny nose   Eyes:  As in HPI  Cardiovascular:  Negative for chest pain, positive as in hpi  Respiratory:  Negative for cough, sputum production, Positive for intermittent SOB  Gastrointestinal: Negative for abdominal pain, nausea, vomiting, , or blood in stools . Positive for constipation, Positive for diarrhea when she eats chilli  Genitourinary: Negative for dysuria, flank pain and hematuria. Positive for nocturia  Extremities: Negative for leg swelling   Neurologic: Negative for headaches, , seizures, weakness . Positive for blurry vision and lightheadedness as in hpi  Endo/Heme/Allergies: Negative for polydipsia. Positive for nocturia      /89 (BP Location: Right arm, Patient Position: Sitting, BP Cuff Size: Adult)   Pulse 77   Temp 36.2 °C (97.2 °F) (Temporal)   Ht 1.6 m (5' 3\")   Wt 121 kg (267 lb 6.4 oz)   SpO2 97%   BMI 47.37 kg/m²     Physical Exam   Constitutional:  Well developed, well nourished. Not in acute distress   HENT:  Normocephalic, Atraumatic, Oropharynx is pink and moist. No oral exudates, Nose normal. Poor oral dentition    Eyes:  EOMI, Conjunctiva normal, No discharge. PERRLA    Cardiovascular:  Regular rate and rhythm, No pedal edema, Intact distal pulses   Respiratory: Clear to auscultation " bilaterally, No use of accessory muscles    Neurologic: Alert & oriented x 4,     Note: I have reviewed all pertinent labs and diagnostic tests associated with this visit with specific comments listed under the assessment and plan below    Assessment and Plan    1. Type 2 diabetes mellitus with nephropathy (HCC)  Increasing metformin from 500 daily to 2000 daily gradually . Caution given that she has metabolic acidosis. Repeat labs in 4 weeks to ensure resolution of that   Lifestyle changes including weight loss, increasing exercise and diet changes  Close follow up in 2weeks - will consider SGLT2 inhibitor in one week if tolerating metformin well/.   Prescribing glucometer, she will check her fasting sugars   Micro albumin creat ratio.   will consider stopping amlodipine and increasing lisinopril dose given CKD, keep same dose for now. Readdress on next visit.   -Starting statin  Address neuropathy , monofilament test on next visit.    - POCT  A1C  - metFORMIN ER 1000 MG TABLET SR 24 HR; Take 2 Tablets by mouth with dinner.  Dispense: 180 Tablet; Refill: 1  - atorvastatin (LIPITOR) 40 MG Tab; Take 1 Tablet by mouth every evening.  Dispense: 90 Tablet; Refill: 1    2. Cerebrovascular accident (CVA), unspecified mechanism (HCC)  Management of diabetes , hypertension as above and also readdress substance useand smoking on next visit..   Continue aspirin and starting statin for secondary prevention  - atorvastatin (LIPITOR) 40 MG Tab; Take 1 Tablet by mouth every evening.  Dispense: 90 Tablet; Refill: 1    3. Primary hypertension  Given history of orthostatics, will not change meds today. Follow up closely in one week. Check microalbumin creat ratio.   Encouraged to continue keeping a log.    4. Hypothyroidism, unspecified type  Will reorder tsh today , currently on 150 mcg    5. Stage 3a chronic kidney disease (CKD) (HCC)  Control diabetes and hypertension, Encouraged hydration, Possibly start sglt2 on next visit,  check micro albumin creat ratio, UA     6. Metabolic acidosis  Caution with metformin   Readdress substance use.   Recheck in 4 weeks.    7. Erythrocytosis  Readdress STOP BANG     8. Transaminitis  Recheck in 4 weeks    9. Dyslipidemia  ASCVD 11.4 , ldl, 101, history of stroke,  Starting statin for secondary prevention    10. Hot flashes  Explained how to manage them on a regular basis with layered clothing, fan etc, avoid smoking, regular exercise.     Followup: Return in about 2 weeks (around 3/30/2023).        Signed by: Kris White M.D.

## 2023-03-19 PROBLEM — N18.30 TYPE 2 DM WITH CKD STAGE 3 AND HYPERTENSION (HCC): Status: ACTIVE | Noted: 2018-05-03

## 2023-03-19 PROBLEM — N18.31 STAGE 3A CHRONIC KIDNEY DISEASE (CKD): Status: ACTIVE | Noted: 2023-02-19

## 2023-03-19 PROBLEM — R74.01 TRANSAMINITIS: Status: ACTIVE | Noted: 2023-03-19

## 2023-03-19 PROBLEM — R23.2 HOT FLASHES: Status: ACTIVE | Noted: 2023-03-19

## 2023-03-19 PROBLEM — I12.9 TYPE 2 DM WITH CKD STAGE 3 AND HYPERTENSION (HCC): Status: ACTIVE | Noted: 2018-05-03

## 2023-03-19 PROBLEM — N18.30 TYPE 2 DM WITH CKD STAGE 3 AND HYPERTENSION (HCC): Status: RESOLVED | Noted: 2018-05-03 | Resolved: 2023-03-19

## 2023-03-19 PROBLEM — E87.20 METABOLIC ACIDOSIS: Status: ACTIVE | Noted: 2023-03-19

## 2023-03-19 PROBLEM — E11.22 TYPE 2 DM WITH CKD STAGE 3 AND HYPERTENSION (HCC): Status: RESOLVED | Noted: 2018-05-03 | Resolved: 2023-03-19

## 2023-03-19 PROBLEM — I12.9 TYPE 2 DM WITH CKD STAGE 3 AND HYPERTENSION (HCC): Status: RESOLVED | Noted: 2018-05-03 | Resolved: 2023-03-19

## 2023-03-19 PROBLEM — D75.1 ERYTHROCYTOSIS: Status: ACTIVE | Noted: 2023-03-19

## 2023-03-19 PROBLEM — E11.22 TYPE 2 DM WITH CKD STAGE 3 AND HYPERTENSION (HCC): Status: ACTIVE | Noted: 2018-05-03

## 2023-03-19 PROBLEM — E78.5 DYSLIPIDEMIA: Status: ACTIVE | Noted: 2023-03-19

## 2023-03-19 RX ORDER — ATORVASTATIN CALCIUM 40 MG/1
40 TABLET, FILM COATED ORAL NIGHTLY
Qty: 90 TABLET | Refills: 1 | Status: SHIPPED | OUTPATIENT
Start: 2023-03-19 | End: 2023-06-30

## 2023-03-19 RX ORDER — METFORMIN HYDROCHLORIDE EXTENDED-RELEASE TABLETS 1000 MG/1
2000 TABLET, FILM COATED, EXTENDED RELEASE ORAL
Qty: 180 TABLET | Refills: 1 | Status: SHIPPED | OUTPATIENT
Start: 2023-03-19 | End: 2023-05-25

## 2023-03-19 RX ORDER — GLUCOSAMINE HCL/CHONDROITIN SU 500-400 MG
CAPSULE ORAL
Qty: 100 EACH | Refills: 0 | Status: SHIPPED | OUTPATIENT
Start: 2023-03-19 | End: 2023-10-03 | Stop reason: SDUPTHER

## 2023-03-19 RX ORDER — LANCETS 30 GAUGE
EACH MISCELLANEOUS
Qty: 100 EACH | Refills: 0 | Status: SHIPPED | OUTPATIENT
Start: 2023-03-19 | End: 2023-10-07 | Stop reason: SDUPTHER

## 2023-03-20 ENCOUNTER — TELEPHONE (OUTPATIENT)
Dept: INTERNAL MEDICINE | Facility: OTHER | Age: 53
End: 2023-03-20

## 2023-03-20 NOTE — TELEPHONE ENCOUNTER
Called patient to let her know that she should take metformin 1000 mg for 1 week and increase it to 2000 mg a second week.  Patient also instructed to do labs including thyroid function test, urinalysis and urine microalbumin creatinine ratio with the possibility of adding to SGLT2 inhibitors for her diabetes.  Patient requesting for the labs to be mailed or faxed to [facility] at [place]  Also inform patient that her glucometer/other diabetic supplies and atorvastatin has been sent to [facility].    For Lila: Please fax lab orders including thyroid function test, urine analysis and microalbumin creatinine ratio to [facility] at [place]

## 2023-03-24 DIAGNOSIS — E03.9 HYPOTHYROIDISM, UNSPECIFIED TYPE: ICD-10-CM

## 2023-03-24 RX ORDER — LEVOTHYROXINE SODIUM 0.15 MG/1
150 TABLET ORAL
Qty: 30 TABLET | Refills: 1 | Status: SHIPPED | OUTPATIENT
Start: 2023-03-24 | End: 2023-06-19

## 2023-05-25 RX ORDER — METFORMIN HYDROCHLORIDE 500 MG/1
500 TABLET, EXTENDED RELEASE ORAL DAILY
Qty: 120 TABLET | Refills: 1 | Status: SHIPPED | OUTPATIENT
Start: 2023-05-25 | End: 2023-10-29 | Stop reason: SDUPTHER

## 2023-05-25 NOTE — PROGRESS NOTES
Cross covering physician note, meditation changed per pharmacy request to different formulary alternative.

## 2023-06-19 DIAGNOSIS — E03.9 HYPOTHYROIDISM, UNSPECIFIED TYPE: ICD-10-CM

## 2023-06-19 RX ORDER — LEVOTHYROXINE SODIUM 0.15 MG/1
150 TABLET ORAL
Qty: 30 TABLET | Refills: 1 | Status: SHIPPED | OUTPATIENT
Start: 2023-06-19 | End: 2023-08-25

## 2023-06-29 DIAGNOSIS — I63.9 CEREBROVASCULAR ACCIDENT (CVA), UNSPECIFIED MECHANISM (HCC): ICD-10-CM

## 2023-06-29 DIAGNOSIS — E11.21 TYPE 2 DIABETES MELLITUS WITH NEPHROPATHY (HCC): ICD-10-CM

## 2023-06-30 RX ORDER — ATORVASTATIN CALCIUM 40 MG/1
40 TABLET, FILM COATED ORAL NIGHTLY
Qty: 90 TABLET | Refills: 1 | Status: SHIPPED | OUTPATIENT
Start: 2023-06-30

## 2023-08-23 DIAGNOSIS — E03.9 HYPOTHYROIDISM, UNSPECIFIED TYPE: ICD-10-CM

## 2023-08-25 RX ORDER — LEVOTHYROXINE SODIUM 0.15 MG/1
150 TABLET ORAL
Qty: 30 TABLET | Refills: 1 | Status: SHIPPED | OUTPATIENT
Start: 2023-08-25 | End: 2023-09-26

## 2023-08-25 NOTE — TELEPHONE ENCOUNTER
Placed refill   Patient will need follow up visit for further refills  Please call patient to set up follow up visit as soon as possible

## 2023-08-31 ENCOUNTER — OFFICE VISIT (OUTPATIENT)
Dept: INTERNAL MEDICINE | Facility: OTHER | Age: 53
End: 2023-08-31
Payer: COMMERCIAL

## 2023-08-31 VITALS
DIASTOLIC BLOOD PRESSURE: 93 MMHG | BODY MASS INDEX: 43.66 KG/M2 | HEIGHT: 63 IN | SYSTOLIC BLOOD PRESSURE: 148 MMHG | TEMPERATURE: 97.3 F | OXYGEN SATURATION: 97 % | HEART RATE: 91 BPM | WEIGHT: 246.4 LBS

## 2023-08-31 DIAGNOSIS — E78.5 DYSLIPIDEMIA: ICD-10-CM

## 2023-08-31 DIAGNOSIS — E66.9 OBESITY WITH BODY MASS INDEX 30 OR GREATER: ICD-10-CM

## 2023-08-31 DIAGNOSIS — I10 PRIMARY HYPERTENSION: ICD-10-CM

## 2023-08-31 DIAGNOSIS — E11.21 TYPE 2 DIABETES MELLITUS WITH NEPHROPATHY (HCC): ICD-10-CM

## 2023-08-31 DIAGNOSIS — R74.01 TRANSAMINITIS: ICD-10-CM

## 2023-08-31 DIAGNOSIS — I63.9 CEREBROVASCULAR ACCIDENT (CVA), UNSPECIFIED MECHANISM (HCC): ICD-10-CM

## 2023-08-31 DIAGNOSIS — Z91.199 NON-ADHERENCE TO MEDICAL TREATMENT: ICD-10-CM

## 2023-08-31 DIAGNOSIS — E03.9 HYPOTHYROIDISM, UNSPECIFIED TYPE: ICD-10-CM

## 2023-08-31 DIAGNOSIS — N18.31 STAGE 3A CHRONIC KIDNEY DISEASE (CKD): ICD-10-CM

## 2023-08-31 PROCEDURE — 3077F SYST BP >= 140 MM HG: CPT | Performed by: INTERNAL MEDICINE

## 2023-08-31 PROCEDURE — 3080F DIAST BP >= 90 MM HG: CPT | Performed by: INTERNAL MEDICINE

## 2023-08-31 PROCEDURE — 99214 OFFICE O/P EST MOD 30 MIN: CPT | Performed by: INTERNAL MEDICINE

## 2023-08-31 NOTE — PATIENT INSTRUCTIONS
Your blood pressure medications are lisinopril 10 mg and amlodipine 5 mg, continue this.  If you are feeling short of breath, have chest pain please check your blood pressure at the time, if the chest pain is lasting longer than 5 minutes please present to the ER.  Continue to take your thyroid medication.  We will check your thyroid labs and I will see you back in 2 weeks  You can take MiraLAX/polyethylene glycol as needed for constipation.  Continue taking the baby aspirin every day, it is to prevent strokes, heart attack  Continue taking the atorvastatin, that is the cholesterol medication to prevent strokes, heart attack.    Omeprazole is for heartburn, ideally should be taken every day however if you are having side effects you can take it as needed.   Start taking 2 tablets of 500 mg SR for 1 week and then the second week increase to 4 tablets to make a total of 2000 mg    Follow up in 2 weeks

## 2023-08-31 NOTE — PROGRESS NOTES
Chief Complaint   Patient presents with    Hypertension     Follow up    Medication Management     General review of medications    Follow-Up     Routine follow up       HPI: Isa Espinoza is a 52 y.o. female with past medical history as below who presented to the clinic fto establish and for the following          *Hypertension -  Has history of CVA. Restarted lisinopril 10 mg and amlodipine 5 mg, however patient stopped taking them 2 months ago, as she was experiencing some side effects which she is unable to explain, also unable to explain which medication it was from.  However after seeing her eye doctor who reiterated the importance of being on medication she has started taking all her medications 3 weeks ago.   Has vision changes secondary to her CVA/other- seeing ophthalmologist.uses 2 sets of glasses , mostly uses them at night .  Patient's blood pressure today in clinic is 148/93 after having restarted lisinopril 10 mg and amlodipine 5 mg 3 weeks ago.  Patient reports 2 or 3 episodes of chest pain spread out over the past 2 months which lasted for 1 minute when she was walking around which relieved with rest, felt like a pressure associated with shortness of breath, feeling tired, dehydrated non radiating, unable to tell if associated with heartburn, or heat, not associated lightheadedness/dizziness/palpitations.   -Patient reports that she has been trying to exercise more, eat healthy.  Also reports that she is drinking a lot more water which is making her feel overall better      *History of CVA with substance use at the time   -TIA like symptoms happened  - 2 years ago-in 2021?- when she had slurry speech, vision changes on one eye, was emotional, seen at [facility] with resolution of symptoms in a few hours with recommendation to follow up with [facility] which she did not do at the time due to resolution of symptoms and then forgot about it. Patient was smoking cannabis and also  "methamphetamine at the time. Patient recently had an eye exam done at [facility] and was told that she has vision changes suggestive of a stroke for which an MRI brain was performed in August 2022 which confirmed the presence of a  old left temporal occipital stroke with chronic microvascular ischemic changes.     Patient currently states that she has worse vision on left eye, however doing well with glasses both for near and far sightedness ( 2 separate glasses) and still drives. No new vision changes.  Echo cardiogram in April 2021 showed trace mR, AI and Trace TR     *Substance use disorder   -Today Isa tells me that she resumed smoking meth and quit one month ago . Noticeable pattern of reporting having quit 1 month ago at every visit   -Dental procedure has not been scheduled yet- appointment in december  -Currently smoking marijuana every other day  -States that she smokes a few cigarettes a month  -Social alcohol use.   -Patient had not presented to appointments for a few months after which she presents today.    *Hypothyroidism   -TSH of 26 in 2015, restarted on levothyroxine 150 mcg. Patient has not taken any meds for the past 2 months and now restarted 3 weeks ago- repeat TSH pending   -States that she and constipation issues in the past     Weight has been up and down, currently down trending . States that her menstrual cycles are intermittently irregular       *Type DM     - Aic of 6.7 in jUly 2022 , was on metformin 500 SR  - positive for nocturia, at least 6 times a night, urinary frequency not so much during the day.A1C repeated in clinic in march, 2023 was  8  May and we started metformin on the last visit, however patient stopped taking it as she reported that she was getting side effects   \"either from aspirin or metformin or one of the new medications that you started-Made me feel  jittery like you know how I come down when I stop drugs.  -Patient however saw her eye doctor reinforced that she " needs to start taking her medications and she has started taking them again 3 weeks ago, quit methamphetamine 1 month ago as per patient.  -Any neuropathy, monofilament test in clinic today was negative.  -Microalbumin creatinine ratio pending, lipid profile pending        *CKD stage 3a   Recent lasbs confirming stage 3 CKD   -Nocturia, however denies any other urinary symptoms.    #DLD   -ASCVD 11.4 with ldl of  101, as per labs done on 03/13/2023  MRI does show chronic microvascular ischemic changes      *Preventative health  -Patient reports that she has received her shingles vaccine  -She reports that she has an appointment for colonoscopy, still awaiting a callback from them to schedule an appointment she will be calling them since she has not heard back from them for 3 weeks.    #Metabolic acidosis  Mild anion gap of 14  -A1C of 8, fasting sugar elevated, however do not suspect DKA, not nauseous , tolerating diet well.   Patient currently not drinking any alcohol. No uremia, not on iron supplements, no signs of infection or ongoing ischemia     #Transaminitis   ALT of 32   No hep screening on file, no abdominal symptoms except constipation     #Erythrocytosis   -oxygen saturation of 97%.   -Unclear if patient has sleep apnea. Unsure if she snores.   STOP BANG without that is intermediate, neck circumference not measured. Patient has not had a chance to ask son regarding snoring     Other problems not discussed on this visit     #Hot flashes   -Not bothering her too much   She sleeps with her window open and carries around a fan everywhere.             Family History   Problem Relation Age of Onset    Stroke Mother         Patient states taht mother was way older than when she or her sister had stroked    Cancer Mother         States that it was somewhere near stomach which had already spread and she went to hospice upon diagnosis    Stroke Father     Stroke Sister 45        Paralysed neck below    No Known  Problems Paternal Aunt       Social History     Socioeconomic History    Marital status:    Tobacco Use    Smoking status: Some Days     Current packs/day: 0.25     Average packs/day: 0.3 packs/day for 29.0 years (7.3 ttl pk-yrs)     Types: Cigarettes    Smokeless tobacco: Former    Tobacco comments:     tried it chewing tobacco but never continued use   Vaping Use    Vaping Use: Never used   Substance and Sexual Activity    Alcohol use: Not Currently     Comment: on special occasions    Drug use: Yes     Types: Marijuana, Methamphetamines     Comment: Marijuana daily, methamphetamine use once 3-4 weeks          Patient Active Problem List    Diagnosis Date Noted    Metabolic acidosis 03/19/2023    Erythrocytosis 03/19/2023    Transaminitis 03/19/2023    Dyslipidemia 03/19/2023    Hot flashes 03/19/2023    Stage 3a chronic kidney disease (CKD) ([facility]) 02/19/2023    CVA (cerebral vascular accident) ([facility]) 04/27/2022    Substance abuse ([facility]) 04/19/2021    Hypothyroidism 08/11/2020    Type 2 diabetes mellitus with nephropathy ([facility]) 08/11/2020    Obesity with body mass index 30 or greater 07/13/2017    HTN (hypertension) 04/17/2015    Non-adherence to medical treatment 04/17/2015       Current Outpatient Medications   Medication Sig Dispense Refill    aspirin (ASA) 81 MG Chew Tab chewable tablet Chew 1 Tablet every day. 100 Tablet 1    levothyroxine (SYNTHROID) 150 MCG Tab TAKE 1 TABLET BY MOUTH EVERY MORNING ON AN EMPTY STOMACH 30 Tablet 1    atorvastatin (LIPITOR) 40 MG Tab TAKE 1 TABLET BY MOUTH EVERY EVENING 90 Tablet 1    metFORMIN ER (GLUCOPHAGE XR) 500 MG TABLET SR 24 HR Take 1 Tablet by mouth every day. 120 Tablet 1    lisinopril (PRINIVIL) 10 MG Tab TAKE 1 TABLET BY MOUTH EVERY DAY 90 Tablet 1    amLODIPine (NORVASC) 5 MG Tab TAKE 1 TABLET BY MOUTH EVERY DAY 90 Tablet 1    Blood Glucose Meter Kit Test blood sugar as recommended by provider. True Metrix blood glucose monitoring kit. 1 Kit 0    Blood Glucose  "Test Strips Use one True Metrix strip to test blood sugar once daily . 100 Strip 0    Lancets Use one True Metrix lancet to test blood sugar once daily . 100 Each 0    Alcohol Swabs Wipe site with prep pad prior to injection. 100 Each 0     No current facility-administered medications for this visit.       ROS: As per HPI. Additional pertinent systems as noted below.  Constitutional:  Negative for fever, chills   HENT:  Negative for ear pain, sore throat, runny nose   Eyes:  As in HPI  Cardiovascular:   positive as in hpi  Respiratory:  Negative for cough, sputum production,   Gastrointestinal: Negative for abdominal pain, nausea, vomiting, , or blood in stools . Positive for constipation, Positive for diarrhea when she eats chilli  Genitourinary: Negative for dysuria, flank pain and hematuria. Positive for nocturia  Extremities: Negative for leg swelling   Neurologic: Negative for headaches, , seizures, weakness . Positive for blurry vision  as in hpi  Endo/Heme/Allergies: Positive for polydipsia, nocturia.  Denies any polyphagia    BP (!) 148/93 (BP Location: Right arm, Patient Position: Sitting, BP Cuff Size: Large adult)   Pulse 91   Temp 36.3 °C (97.3 °F) (Temporal)   Ht 1.6 m (5' 3\")   Wt 112 kg (246 lb 6.4 oz)   SpO2 97%   BMI 43.65 kg/m²     Physical Exam   Constitutional:  Well developed, well nourished. Not in acute distress   Eyes:  EOMI, Conjunctiva normal, No discharge.   Cardiovascular:  Regular rate and rhythm, No pedal edema, Intact distal pulses   Respiratory: Clear to auscultation bilaterally, No use of accessory muscles    Neurologic: Alert & oriented x 4, monofilament test normal today    Note: I have reviewed all pertinent labs and diagnostic tests associated with this visit with specific comments listed under the assessment and plan below    Assessment and Plan    1. Type 2 diabetes mellitus with nephropathy (HCC)  Patient returns after few months of not showing to appointments and after " having stopped medications for 2 months, resumption of substance use however after having quit meth a month ago, restarting medications 3 weeks ago, currently doing well on metformin 500 mg as per patient.  We had prescribed 1000 mg SR 2 tablets however patient reports that she is taking 500 mg SR, instructed patient to increase the dose to 2000 mg over 2 weeks.  I am not adding SGLT2 inhibitor this time until we have labs back.  -Patient does report polydipsia, polyuria-hence depending on the A1c, may need insulin, we will follow-up closely in 2 weeks.  -Microalbumin creatinine ratio pending  -Monofilament test today on 8/31/2023 was normal,  Due in August 2024.  -Patient is following up with ophthalmologist regularly  -Patient denies any new vision changes, tingling or numbness.  -Patient does have intermittent constipation, also has thyroid issues, and has been nonadherent to medications intermittently.  -We had prescribed glucometer on the last visit however patient is currently not checking blood sugars, will wait until we have all labs and during follow-up in 2 weeks we will discuss regarding this.  Lifestyle changes including weight loss, increasing exercise and diet changes  -Patient is also on a statin, may need increased dose of lisinopril depending on microalbumin creatinine ratio.    - Comp Metabolic Panel; Future  - Hemoglobin A1c; Future  - Lipid Profile; Future  - Microalbumin Creat Ratio Urine - Lab Collect; Future    2. Cerebrovascular accident (CVA), unspecified mechanism (HCC)  Management of diabetes , hypertension as above and also readdress substance useand smoking on next visit..   Patient restarted all her medications 3 weeks ago, however she has not restarted taking aspirin which she did not know was important for her.  -Reiterated the importance of aspirin and secondary prevention of CVA/heart attacks.  -I am also placing a prescription for aspirin    - Comp Metabolic Panel; Future  -  aspirin (ASA) 81 MG Chew Tab chewable tablet; Chew 1 Tablet every day.  Dispense: 100 Tablet; Refill: 1    3. Primary hypertension  Patient returns after few months of not showing to appointments and after having stopped medications for 2 months, resumption of substance use however after having quit meth a month ago, restarting medications 3 weeks ago, with current blood pressure 148/93 on lisinopril 10 mg, amlodipine 5 mg.  -At the start of the visit patient tells me that she has been on higher doses of lisinopril and amlodipine prior and that was what controlled her blood pressures which were as high as 200s over 110 status per patient.  However when asking patient if her blood pressures have been that high at home, patient answered that she has not been checking.   -Explained to patient that she may have had high blood pressures in the past due to substance use which is now starting to trend down as she has quit and she may not need higher doses of lisinopril, amlodipine.  -Blood pressures are still not at target however given issues with nonadherence I will not make significant changes today, reinforced importance to get labs done in 2 weeks after which we shall increase lisinopril.  -In the future may consider discontinuing amlodipine and just focusing on lisinopril given history of CVA, diabetes.    - Comp Metabolic Panel; Future  - Lipid Profile; Future  - Microalbumin Creat Ratio Urine - Lab Collect; Future  - URINALYSIS; Future      4. Hypothyroidism, unspecified type  -Tinea levothyroxine 150 mcg, which patient has been taking daily as per patient for the past 3 weeks, repeat TSH with reflex free T4 ordered.    - TSH WITH REFLEX TO FT4; Future    5. Stage 3a chronic kidney disease (CKD) (HCC)  Control diabetes and hypertension, Encouraged hydration, Possibly start sglt2 on next visit, check micro albumin creat ratio, UA     - Comp Metabolic Panel; Future    6. Metabolic acidosis  Caution with metformin    Readdress substance use.   Repeat labs ordered     7. Erythrocytosis  Readdress STOP BANG     8. Transaminitis  Repeat labs ordered    9. Dyslipidemia  ASCVD 11.4 , ldl, 101, history of stroke,  Patient has been taking atorvastatin for 3 weeks after having stopped for 2 months, doing well at this time.  -Repeat lipid panel ordered    8. Non-adherence to medical treatment  -Reinforced the need to continue taking medications daily, explained that she can should try to stop 1 medication at a time if she is experiencing side effect to see which one is causing her side effects after discussing with the doctor.    9. Obesity with body mass index 30 or greater    - Comp Metabolic Panel; Future  - Lipid Profile; Future     Followup: Return in about 2 weeks (around 9/14/2023).        Signed by: Kris White M.D.

## 2023-09-01 RX ORDER — ASPIRIN 81 MG/1
81 TABLET, CHEWABLE ORAL DAILY
Qty: 100 TABLET | Refills: 1 | Status: SHIPPED | OUTPATIENT
Start: 2023-09-01 | End: 2023-11-04 | Stop reason: SDUPTHER

## 2023-09-02 LAB — HBA1C MFR BLD: 14 % (ref ?–5.8)

## 2023-09-12 NOTE — PROGRESS NOTES
Chief Complaint   Patient presents with    Diabetes     Follow up       HPI: Isa Espinoza is a 52 y.o. female with past medical history as below who presented to the clinic  for the following     *Type DM     - Aic of 6.7 in jUly 2022 , was on metformin 500 SR, > march, 2023 was  8> now > 14 done on 09/2023 intermittent non adherence with metformin, however now is taking it regularly   - positive for nocturia, at least 6 times a night, urinary frequency not so much during the day.  -Patient regularly sees optometrist  -denies Any neuropathy, monofilament test in clinic  last visit  was negative.  -albumin creatinine ratio showing proteinuria= 85, renal function 15/1.03 with egfr 63 of , with UA showing ketones, protein glucose, high specific gravity, squamous epithelial cell, hyaline cast and few bacteria with no burning urgency, fevers chills, flank pain at this time     -Denies any constipation. Does feel nauseous sometimes. Without any bloating. Unable to eat a lot due to dental issues    --Patient would also like to see a dietician     *Feeling full in throat   -reports that she feels full in throat, no difficulty swallowing, no hoarseness, no choking. Does have reflux and heart burn. Was on omeprazole, not anymore.   -TSH on last labs done in September 2023 wnl.   -On examination - cannot appreciate LN or thyromegaly. Patient does have enlarged tonsils- denies any sore throat.       *Hypertension -  Has history of CVA. Restarted lisinopril 10 mg and amlodipine 5 mg, with intermittent non adherance to medications, however adhere since last visit    Has vision changes secondary to her CVA/other- seeing ophthalmologist.uses 2 sets of glasses , mostly uses them at night .  Patient's blood pressure today in clinic is 115/75   -Patient reports that she has been trying to exercise more.      *History of CVA with substance use at the time   -TIA like symptoms happened  - 2 years ago-in 2021?- when she had  slurry speech, vision changes on one eye, was emotional, seen at [facility] with resolution of symptoms in a few hours with recommendation to follow up with [facility] clinic which she did not do at the time due to resolution of symptoms and then forgot about it. Patient was smoking cannabis and also methamphetamine at the time. Patient recently had an eye exam done at [facility] and was told that she has vision changes suggestive of a stroke for which an MRI brain was performed in August 2022 which confirmed the presence of a  old left temporal occipital stroke with chronic microvascular ischemic changes.     Patient currently states that she has worse vision on left eye, however doing well with glasses both for near and far sightedness ( 2 separate glasses) and still drives. No new vision changes.  Echo cardiogram in April 2021 showed trace mR, AI and Trace TR     *Substance use disorder   -did have intermittent relapses however has been abstinent from meth since last visit and 1 month prior to that atleast as paer patient   -Dental procedure has not been scheduled yet- appointment in december  -Currently smoking marijuana every other day  -States that she smokes a few cigarettes a month  -Social alcohol use.   -Patient had not presented to appointments for a few months after which she presents today.    *Hypothyroidism   -TSH of 26 in 2015, restarted on levothyroxine 150 mcg. With intermittent non adherence, most recent TSH wnl from September 2023    Weight has been up and down, currently down trending . States that her menstrual cycles are intermittently irregular       *CKD stage 3a> CKD stage 2  As per most recent labs.   albumin creatinine ratio showing proteinuria= 85, renal function 15/1.03 with egfr 63 of , with UA showing ketones, protein glucose, high specific gravity, squamous epithelial cell, hyaline cast and few bacteria with no burning urgency, fevers chills, flank pain at this time         #DLD    -ldl of 69 as per most recent labs in September 2023- at goal on atorvastatin 40 and lifestyle changes   , as per labs done on 03/13/2023  MRI does show chronic microvascular ischemic changes      *Preventative health  -Patient reports that she has received her shingles vaccine  -She reports that she has an appointment for colonoscopy, still awaiting a callback from them to schedule an appointment she will be calling them since she has not heard back from them for 3 weeks.    #Metabolic acidosis  resolved    #Transaminitis   Resolved      Other problems not discussed on this visit     #Hot flashes   -Not bothering her too much   She sleeps with her window open and carries around a fan everywhere.     #Erythrocytosis   -oxygen saturation of 97%.   -Unclear if patient has sleep apnea. Unsure if she snores.   STOP BANG without that is intermediate, neck circumference not measured. Patient has not had a chance to ask son regarding snoring           Family History   Problem Relation Age of Onset    Stroke Mother         Patient states taht mother was way older than when she or her sister had stroked    Cancer Mother         States that it was somewhere near stomach which had already spread and she went to hospice upon diagnosis    Stroke Father     Stroke Sister 45        Paralysed neck below    No Known Problems Paternal Aunt       Social History     Socioeconomic History    Marital status:    Tobacco Use    Smoking status: Some Days     Current packs/day: 0.25     Average packs/day: 0.3 packs/day for 29.0 years (7.3 ttl pk-yrs)     Types: Cigarettes    Smokeless tobacco: Former    Tobacco comments:     tried it chewing tobacco but never continued use   Vaping Use    Vaping Use: Never used   Substance and Sexual Activity    Alcohol use: Not Currently     Comment: on special occasions    Drug use: Yes     Types: Marijuana, Methamphetamines     Comment: Marijuana daily, methamphetamine use once 3-4 weeks           Patient Active Problem List    Diagnosis Date Noted    Metabolic acidosis 03/19/2023    Erythrocytosis 03/19/2023    Transaminitis 03/19/2023    Dyslipidemia 03/19/2023    Hot flashes 03/19/2023    Stage 3a chronic kidney disease (CKD) ([facility]) 02/19/2023    CVA (cerebral vascular accident) ([facility]) 04/27/2022    Substance abuse ([facility]) 04/19/2021    Hypothyroidism 08/11/2020    Type 2 diabetes mellitus with nephropathy ([facility]) 08/11/2020    Obesity with body mass index 30 or greater 07/13/2017    HTN (hypertension) 04/17/2015    Non-adherence to medical treatment 04/17/2015       Current Outpatient Medications   Medication Sig Dispense Refill    Insulin Glargine Solostar 100 UNIT/ML Solution Pen-injector Inject 8 Units under the skin every evening. 8 mL 1    omeprazole (PRILOSEC) 20 MG delayed-release capsule Take 1 Capsule by mouth every day. 30 Capsule 1    Semaglutide,0.25 or 0.5MG/DOS, (OZEMPIC, 0.25 OR 0.5 MG/DOSE,) 2 MG/3ML Solution Pen-injector Inject 0.25 mg under the skin every 7 days. 6 mL 1    aspirin (ASA) 81 MG Chew Tab chewable tablet Chew 1 Tablet every day. 100 Tablet 1    levothyroxine (SYNTHROID) 150 MCG Tab TAKE 1 TABLET BY MOUTH EVERY MORNING ON AN EMPTY STOMACH 30 Tablet 1    atorvastatin (LIPITOR) 40 MG Tab TAKE 1 TABLET BY MOUTH EVERY EVENING 90 Tablet 1    metFORMIN ER (GLUCOPHAGE XR) 500 MG TABLET SR 24 HR Take 1 Tablet by mouth every day. (Patient taking differently: Take 1,000 mg by mouth 2 times a day.) 120 Tablet 1    lisinopril (PRINIVIL) 10 MG Tab TAKE 1 TABLET BY MOUTH EVERY DAY 90 Tablet 1    amLODIPine (NORVASC) 5 MG Tab TAKE 1 TABLET BY MOUTH EVERY DAY 90 Tablet 1    Blood Glucose Meter Kit Test blood sugar as recommended by provider. True Metrix blood glucose monitoring kit. 1 Kit 0    Blood Glucose Test Strips Use one True Metrix strip to test blood sugar once daily . 100 Strip 0    Lancets Use one True Metrix lancet to test blood sugar once daily . 100 Each 0    Alcohol Swabs Wipe  "site with prep pad prior to injection. 100 Each 0     No current facility-administered medications for this visit.       ROS: As per HPI. Additional pertinent systems as noted below.  Constitutional:  Negative for fever, chills   HENT:  Negative for ear pain, sore throat, runny nose   Eyes:  As in HPI  Cardiovascular:   denies any chest pain palpitations at this time  Respiratory:  Negative for cough, sputum production,   Gastrointestinal: Negative for abdominal pain, nausea, vomiting, , or blood in stools . Positive as in hpi- constipation better   Genitourinary: Negative for dysuria, flank pain and hematuria. Positive for nocturia  Extremities: Negative for leg swelling   Neurologic: Negative for headaches, , seizures, weakness . Positive for blurry vision  as in hpi  Endo/Heme/Allergies: Positive for polydipsia, nocturia.  Denies any polyphagia    /75 (BP Location: Right arm, Patient Position: Sitting, BP Cuff Size: Large adult)   Pulse 86   Temp 36.1 °C (97 °F) (Temporal)   Ht 1.6 m (5' 3\")   Wt 111 kg (244 lb 9.6 oz)   SpO2 97%   BMI 43.33 kg/m²     Physical Exam   Constitutional:  Well developed, well nourished. Not in acute distress   Eyes:  EOMI, Conjunctiva normal, No discharge.   Cardiovascular:  Regular rate and rhythm, No pedal edema, Intact distal pulses   Respiratory: Clear to auscultation bilaterally, No use of accessory muscles    Neurologic: Alert & oriented x 4, monofilament test normal today    Note: I have reviewed all pertinent labs and diagnostic tests associated with this visit with specific comments listed under the assessment and plan below    Assessment and Plan    1. Type 2 diabetes mellitus with nephropathy (HCC)  Continue metformin 2000 mg per day, adding insulin glargine 8 units after discussion with patient. Also prescribing GLP-1 agonist - given ease of administration and once a week injection to ensure adherance to medication and considering cardiovascular mortality. Will " add sglt2 in future once sugars slightly better controlled with insulin.   Close follow up in 2 weeks.   Educated regarding checking sugars in morning and keeping a log which is to be brought to clinic as well as on hypoglycemia.   -will increase lisinopril today to 20 mg  and drop amlodipine - instructed to keep log of blood pressure for next visit  -I am also placing a nutrition referral as per patient's request after discussion  -Microalbumin creatinine ratio next in september 2024  -Monofilament test next due in August 2024.  -Patient is following up with ophthalmologist regularly  -Patient denies any new vision changes, tingling or numbness.  -management of GI symptoms as below.  -patient had been prescribed glucometer on previous visit - and states that she has one with all supplies to check sugars which she inherited from grandparent - unclear if she is using the prescribed one.  Lifestyle changes including weight loss, increasing exercise and diet changes  -Patient is also on a statin,     2. Primary hypertension  -I am increasing the dose of lisinopril to 20 mg, and dropping amlodipine.  Provided instructions to patient, instructed to keep a log of blood pressures and bring to next visit    3. Gastroesophageal reflux disease without esophagitis  -Patient description of symptoms are associated with reflux-like symptoms hence will trial omeprazole.  -If continues to have feeling of fullness in throat we will pursue an ultrasound/x-ray of the neck.  - omeprazole (PRILOSEC) 20 MG delayed-release capsule; Take 1 Capsule by mouth every day.  Dispense: 30 Capsule; Refill: 1    4. CKD (chronic kidney disease) stage 2, GFR 60-89 ml/min  -Was stage III, on latest labs has come down to stage II  -Does have proteinuria, lisinopril dose being increased today.  -In future visit we will consider SGLT2 inhibitor addition once sugars are slightly better controlled on the insulin    5. Dyslipidemia  -Patient does have  significant improvement in LDL compared to last labs, continue atorvastatin 40 mg as well as lifestyle changes including weight loss dietary modification exercise    6. Hypothyroidism, unspecified type  -TSH checked in September within normal meds, continue levothyroxine at 50 mcg  -To recheck in 3 months    7. Substance abuse (HCC)  -Currently only smoking cannabis/cigarettes occasionally, not inhaling methamphetamine as per patient, congratulated on this  -Continue to address at every visit        Followup: Return in about 2 weeks (around 9/27/2023).        Signed by: Kris White M.D.

## 2023-09-13 ENCOUNTER — OFFICE VISIT (OUTPATIENT)
Dept: INTERNAL MEDICINE | Facility: OTHER | Age: 53
End: 2023-09-13
Payer: COMMERCIAL

## 2023-09-13 VITALS
TEMPERATURE: 97 F | SYSTOLIC BLOOD PRESSURE: 115 MMHG | BODY MASS INDEX: 43.34 KG/M2 | HEIGHT: 63 IN | HEART RATE: 86 BPM | OXYGEN SATURATION: 97 % | DIASTOLIC BLOOD PRESSURE: 75 MMHG | WEIGHT: 244.6 LBS

## 2023-09-13 DIAGNOSIS — E66.9 OBESITY WITH BODY MASS INDEX 30 OR GREATER: ICD-10-CM

## 2023-09-13 DIAGNOSIS — I10 PRIMARY HYPERTENSION: ICD-10-CM

## 2023-09-13 DIAGNOSIS — N18.2 CKD (CHRONIC KIDNEY DISEASE) STAGE 2, GFR 60-89 ML/MIN: ICD-10-CM

## 2023-09-13 DIAGNOSIS — E11.21 TYPE 2 DIABETES MELLITUS WITH NEPHROPATHY (HCC): ICD-10-CM

## 2023-09-13 DIAGNOSIS — E03.9 HYPOTHYROIDISM, UNSPECIFIED TYPE: ICD-10-CM

## 2023-09-13 DIAGNOSIS — E78.5 DYSLIPIDEMIA: ICD-10-CM

## 2023-09-13 DIAGNOSIS — K21.9 GASTROESOPHAGEAL REFLUX DISEASE WITHOUT ESOPHAGITIS: ICD-10-CM

## 2023-09-13 DIAGNOSIS — F19.10 SUBSTANCE ABUSE (HCC): ICD-10-CM

## 2023-09-13 PROCEDURE — 3078F DIAST BP <80 MM HG: CPT | Performed by: INTERNAL MEDICINE

## 2023-09-13 PROCEDURE — 99214 OFFICE O/P EST MOD 30 MIN: CPT | Performed by: INTERNAL MEDICINE

## 2023-09-13 PROCEDURE — 3074F SYST BP LT 130 MM HG: CPT | Performed by: INTERNAL MEDICINE

## 2023-09-13 RX ORDER — INSULIN GLARGINE 100 [IU]/ML
8 INJECTION, SOLUTION SUBCUTANEOUS EVERY EVENING
Qty: 8 ML | Refills: 1 | Status: SHIPPED | OUTPATIENT
Start: 2023-09-13 | End: 2023-11-04 | Stop reason: SDUPTHER

## 2023-09-13 RX ORDER — SEMAGLUTIDE 0.68 MG/ML
0.25 INJECTION, SOLUTION SUBCUTANEOUS
Qty: 6 ML | Refills: 1 | Status: SHIPPED | OUTPATIENT
Start: 2023-09-13 | End: 2023-10-18

## 2023-09-13 RX ORDER — OMEPRAZOLE 20 MG/1
20 CAPSULE, DELAYED RELEASE ORAL DAILY
Qty: 30 CAPSULE | Refills: 1 | Status: SHIPPED | OUTPATIENT
Start: 2023-09-13

## 2023-09-13 NOTE — PATIENT INSTRUCTIONS
I am starting you on 8 units of insulin- take it at the same time every evening.   Check your blood sugar first thing in the morning and keep a log of that. Bring to next appointment.    Keep a candy with you at all times.   Check your sugars if you feel lightheaded dizzy, heart racing, sweaty, hungry, groggy, and take the candy if sugar is less than 100.   I am also prescribing a weekly injection medication. If not covered by insurance then you don't have to take it. We can talk about other options,   I will see you back in 2 weeks.  STOP amlodipine and double dose of lisinopril to 20 mg from 10 mg   Continue aspirin atorvastatin, metformin, levothyroxine.   I am prescribing omeprazole for reflux, heartburn.  I am also placing referral for dietician     Follow up in 2 weeks

## 2023-09-14 PROBLEM — R74.01 TRANSAMINITIS: Status: RESOLVED | Noted: 2023-03-19 | Resolved: 2023-09-14

## 2023-09-14 PROBLEM — E87.20 METABOLIC ACIDOSIS: Status: RESOLVED | Noted: 2023-03-19 | Resolved: 2023-09-14

## 2023-09-14 PROBLEM — N18.2 CKD (CHRONIC KIDNEY DISEASE) STAGE 2, GFR 60-89 ML/MIN: Status: ACTIVE | Noted: 2023-02-19

## 2023-09-25 DIAGNOSIS — I10 PRIMARY HYPERTENSION: ICD-10-CM

## 2023-09-25 DIAGNOSIS — E03.9 HYPOTHYROIDISM, UNSPECIFIED TYPE: ICD-10-CM

## 2023-09-26 RX ORDER — LISINOPRIL 20 MG/1
20 TABLET ORAL DAILY
Qty: 90 TABLET | Refills: 1 | Status: SHIPPED | OUTPATIENT
Start: 2023-09-26 | End: 2023-10-18 | Stop reason: DRUGHIGH

## 2023-09-26 RX ORDER — LEVOTHYROXINE SODIUM 0.15 MG/1
150 TABLET ORAL
Qty: 90 TABLET | Refills: 1 | Status: SHIPPED | OUTPATIENT
Start: 2023-09-26 | End: 2023-11-04 | Stop reason: SDUPTHER

## 2023-09-26 NOTE — TELEPHONE ENCOUNTER
Placed refill for lisinopril 20 mg - increased on last visit from lisinopril 10 mg to 20 mg   Placed refill for levothyroxine 150 mcg - same dose.

## 2023-10-03 DIAGNOSIS — E11.21 TYPE 2 DIABETES MELLITUS WITH NEPHROPATHY (HCC): ICD-10-CM

## 2023-10-03 NOTE — PROGRESS NOTES
New Patient    Patient Care Team:  Kris White M.D. as PCP - General (Internal Medicine)    Isa Espinoza is a 53 y.o. female who presents today to establish and with the following Chief Complaint(s): Follow up for There were no encounter diagnoses.    ROS:     Denies any new chest pain or shortness of breath.  No changes to urinary or bowel function. ***  See HPI.    Past Medical History:   Diagnosis Date    Hypertension     Metabolic acidosis 3/19/2023    Thyroid disorder     Transaminitis 3/19/2023    Type II or unspecified type diabetes mellitus without mention of complication, not stated as uncontrolled      Social History     Tobacco Use    Smoking status: Some Days     Current packs/day: 0.25     Average packs/day: 0.3 packs/day for 29.0 years (7.3 ttl pk-yrs)     Types: Cigarettes    Smokeless tobacco: Former    Tobacco comments:     tried it chewing tobacco but never continued use   Vaping Use    Vaping Use: Never used   Substance Use Topics    Alcohol use: Not Currently     Comment: on special occasions    Drug use: Yes     Types: Marijuana, Methamphetamines     Comment: Marijuana daily, methamphetamine use once 3-4 weeks     Current Outpatient Medications   Medication Sig Dispense Refill    lisinopril (PRINIVIL) 20 MG Tab Take 1 Tablet by mouth every day. 90 Tablet 1    levothyroxine (SYNTHROID) 150 MCG Tab TAKE 1 TABLET BY MOUTH EVERY MORNING ON AN EMPTY STOMACH 90 Tablet 1    Insulin Glargine Solostar 100 UNIT/ML Solution Pen-injector Inject 8 Units under the skin every evening. 8 mL 1    omeprazole (PRILOSEC) 20 MG delayed-release capsule Take 1 Capsule by mouth every day. 30 Capsule 1    Semaglutide,0.25 or 0.5MG/DOS, (OZEMPIC, 0.25 OR 0.5 MG/DOSE,) 2 MG/3ML Solution Pen-injector Inject 0.25 mg under the skin every 7 days. 6 mL 1    aspirin (ASA) 81 MG Chew Tab chewable tablet Chew 1 Tablet every day. 100 Tablet 1    atorvastatin (LIPITOR) 40 MG Tab TAKE 1 TABLET BY MOUTH EVERY EVENING 90 Tablet  1    metFORMIN ER (GLUCOPHAGE XR) 500 MG TABLET SR 24 HR Take 1 Tablet by mouth every day. (Patient taking differently: Take 1,000 mg by mouth 2 times a day.) 120 Tablet 1    amLODIPine (NORVASC) 5 MG Tab TAKE 1 TABLET BY MOUTH EVERY DAY 90 Tablet 1    Blood Glucose Meter Kit Test blood sugar as recommended by provider. True Metrix blood glucose monitoring kit. 1 Kit 0    Blood Glucose Test Strips Use one True Metrix strip to test blood sugar once daily . 100 Strip 0    Lancets Use one True Metrix lancet to test blood sugar once daily . 100 Each 0    Alcohol Swabs Wipe site with prep pad prior to injection. 100 Each 0     No current facility-administered medications for this visit.       Physical Exam:  There were no vitals taken for this visit.  General: Well developed, well nourished female, in no distress.  Eyes: Conjuntiva without any obvious injection or erythema.   Cardiovascular: Heart is regular with *** murmur  Lungs: Clear to auscultation bilaterally. No wheezes, rhonci or crackles heard. Respiratory effort is normal.  Abd: Soft, non-tender  Ext: No edema  ***    HPI / Assessment / Plan:       *Type DM      - Aic of 6.7 in jUly 2022 , was on metformin 500 SR, > march, 2023 was  8> now > 14 done on 09/2023 intermittent non adherence with metformin, however now is taking it regularly   - positive for nocturia, at least 6 times a night, urinary frequency not so much during the day.  -Patient regularly sees optometrist  -denies Any neuropathy, monofilament test in clinic  last visit  was negative.  -albumin creatinine ratio showing proteinuria= 85, renal function 15/1.03 with egfr 63 of , with UA showing ketones, protein glucose, high specific gravity, squamous epithelial cell, hyaline cast and few bacteria with no burning urgency, fevers chills, flank pain at this time     -Denies any constipation. Does feel nauseous sometimes. Without any bloating. Unable to eat a lot due to dental issues    --Patient would  also like to see a dietician      *Feeling full in throat   -reports that she feels full in throat, no difficulty swallowing, no hoarseness, no choking. Does have reflux and heart burn. Was on omeprazole, not anymore.   -TSH on last labs done in September 2023 wnl.   -On examination - cannot appreciate LN or thyromegaly. Patient does have enlarged tonsils- denies any sore throat.         *Hypertension -  Has history of CVA. Restarted lisinopril 10 mg and amlodipine 5 mg, with intermittent non adherance to medications, however adhere since last visit    Has vision changes secondary to her CVA/other- seeing ophthalmologist.uses 2 sets of glasses , mostly uses them at night .  Patient's blood pressure today in clinic is 115/75   -Patient reports that she has been trying to exercise more.        *History of CVA with substance use at the time   -TIA like symptoms happened  - 2 years ago-in 2021?- when she had slurry speech, vision changes on one eye, was emotional, seen at [facility] with resolution of symptoms in a few hours with recommendation to follow up with [facility] clinic which she did not do at the time due to resolution of symptoms and then forgot about it. Patient was smoking cannabis and also methamphetamine at the time. Patient recently had an eye exam done at [facility] and was told that she has vision changes suggestive of a stroke for which an MRI brain was performed in August 2022 which confirmed the presence of a  old left temporal occipital stroke with chronic microvascular ischemic changes.      Patient currently states that she has worse vision on left eye, however doing well with glasses both for near and far sightedness ( 2 separate glasses) and still drives. No new vision changes.  Echo cardiogram in April 2021 showed trace mR, AI and Trace TR      *Substance use disorder   -did have intermittent relapses however has been abstinent from meth since last visit and 1 month prior to that atleast as  paer patient   -Dental procedure has not been scheduled yet- appointment in december  -Currently smoking marijuana every other day  -States that she smokes a few cigarettes a month  -Social alcohol use.   -Patient had not presented to appointments for a few months after which she presents today.     *Hypothyroidism   -TSH of 26 in 2015, restarted on levothyroxine 150 mcg. With intermittent non adherence, most recent TSH wnl from September 2023    Weight has been up and down, currently down trending . States that her menstrual cycles are intermittently irregular         *CKD stage 3a> CKD stage 2  As per most recent labs.   albumin creatinine ratio showing proteinuria= 85, renal function 15/1.03 with egfr 63 of , with UA showing ketones, protein glucose, high specific gravity, squamous epithelial cell, hyaline cast and few bacteria with no burning urgency, fevers chills, flank pain at this time           #DLD   -ldl of 69 as per most recent labs in September 2023- at goal on atorvastatin 40 and lifestyle changes   , as per labs done on 03/13/2023  MRI does show chronic microvascular ischemic changes       *Preventative health  -Patient reports that she has received her shingles vaccine  -She reports that she has an appointment for colonoscopy, still awaiting a callback from them to schedule an appointment she will be calling them since she has not heard back from them for 3 weeks.     #Metabolic acidosis  resolved     #Transaminitis   Resolved        Other problems not discussed on this visit      #Hot flashes   -Not bothering her too much   She sleeps with her window open and carries around a fan everywhere.      #Erythrocytosis   -oxygen saturation of 97%.   -Unclear if patient has sleep apnea. Unsure if she snores.   STOP BANG without that is intermediate, neck circumference not measured. Patient has not had a chance to ask son regarding snoring    *Type DM      - Aic of 6.7 in jUly 2022 , was on metformin 500 SR,  > march, 2023 was  8> now > 14 done on 09/2023 intermittent non adherence with metformin, however now is taking it regularly   - positive for nocturia, at least 6 times a night, urinary frequency not so much during the day.  -Patient regularly sees optometrist  -denies Any neuropathy, monofilament test in clinic  last visit  was negative.  -albumin creatinine ratio showing proteinuria= 85, renal function 15/1.03 with egfr 63 of , with UA showing ketones, protein glucose, high specific gravity, squamous epithelial cell, hyaline cast and few bacteria with no burning urgency, fevers chills, flank pain at this time     -Denies any constipation. Does feel nauseous sometimes. Without any bloating. Unable to eat a lot due to dental issues    --Patient would also like to see a dietician      *Feeling full in throat   -reports that she feels full in throat, no difficulty swallowing, no hoarseness, no choking. Does have reflux and heart burn. Was on omeprazole, not anymore.   -TSH on last labs done in September 2023 wnl.   -On examination - cannot appreciate LN or thyromegaly. Patient does have enlarged tonsils- denies any sore throat.         *Hypertension -  Has history of CVA. Restarted lisinopril 10 mg and amlodipine 5 mg, with intermittent non adherance to medications, however adhere since last visit    Has vision changes secondary to her CVA/other- seeing ophthalmologist.uses 2 sets of glasses , mostly uses them at night .  Patient's blood pressure today in clinic is 115/75   -Patient reports that she has been trying to exercise more.        *History of CVA with substance use at the time   -TIA like symptoms happened  - 2 years ago-in 2021?- when she had slurry speech, vision changes on one eye, was emotional, seen at [facility] with resolution of symptoms in a few hours with recommendation to follow up with [facility] clinic which she did not do at the time due to resolution of symptoms and then forgot about it. Patient  was smoking cannabis and also methamphetamine at the time. Patient recently had an eye exam done at [facility] and was told that she has vision changes suggestive of a stroke for which an MRI brain was performed in August 2022 which confirmed the presence of a  old left temporal occipital stroke with chronic microvascular ischemic changes.      Patient currently states that she has worse vision on left eye, however doing well with glasses both for near and far sightedness ( 2 separate glasses) and still drives. No new vision changes.  Echo cardiogram in April 2021 showed trace mR, AI and Trace TR      *Substance use disorder   -did have intermittent relapses however has been abstinent from meth since last visit and 1 month prior to that atleast as paer patient   -Dental procedure has not been scheduled yet- appointment in december  -Currently smoking marijuana every other day  -States that she smokes a few cigarettes a month  -Social alcohol use.   -Patient had not presented to appointments for a few months after which she presents today.     *Hypothyroidism   -TSH of 26 in 2015, restarted on levothyroxine 150 mcg. With intermittent non adherence, most recent TSH wnl from September 2023    Weight has been up and down, currently down trending . States that her menstrual cycles are intermittently irregular         *CKD stage 3a> CKD stage 2  As per most recent labs.   albumin creatinine ratio showing proteinuria= 85, renal function 15/1.03 with egfr 63 of , with UA showing ketones, protein glucose, high specific gravity, squamous epithelial cell, hyaline cast and few bacteria with no burning urgency, fevers chills, flank pain at this time           #DLD   -ldl of 69 as per most recent labs in September 2023- at goal on atorvastatin 40 and lifestyle changes   , as per labs done on 03/13/2023  MRI does show chronic microvascular ischemic changes       *Preventative health  -Patient reports that she has received her  shingles vaccine  -She reports that she has an appointment for colonoscopy, still awaiting a callback from them to schedule an appointment she will be calling them since she has not heard back from them for 3 weeks.     #Metabolic acidosis  resolved     #Transaminitis   Resolved        Other problems not discussed on this visit      #Hot flashes   -Not bothering her too much   She sleeps with her window open and carries around a fan everywhere.      #Erythrocytosis   -oxygen saturation of 97%.   -Unclear if patient has sleep apnea. Unsure if she snores.   STOP BANG without that is intermediate, neck circumference not measured. Patient has not had a chance to ask son regarding snoring     1. Type 2 diabetes mellitus with nephropathy (HCC)  Continue metformin 2000 mg per day, adding insulin glargine 8 units after discussion with patient. Also prescribing GLP-1 agonist - given ease of administration and once a week injection to ensure adherance to medication and considering cardiovascular mortality. Will add sglt2 in future once sugars slightly better controlled with insulin.   Close follow up in 2 weeks.   Educated regarding checking sugars in morning and keeping a log which is to be brought to clinic as well as on hypoglycemia.   -will increase lisinopril today to 20 mg  and drop amlodipine - instructed to keep log of blood pressure for next visit  -I am also placing a nutrition referral as per patient's request after discussion  -Microalbumin creatinine ratio next in september 2024  -Monofilament test next due in August 2024.  -Patient is following up with ophthalmologist regularly  -Patient denies any new vision changes, tingling or numbness.  -management of GI symptoms as below.  -patient had been prescribed glucometer on previous visit - and states that she has one with all supplies to check sugars which she inherited from grandparent - unclear if she is using the prescribed one.  Lifestyle changes including  weight loss, increasing exercise and diet changes  -Patient is also on a statin,      2. Primary hypertension  -I am increasing the dose of lisinopril to 20 mg, and dropping amlodipine.  Provided instructions to patient, instructed to keep a log of blood pressures and bring to next visit     3. Gastroesophageal reflux disease without esophagitis  -Patient description of symptoms are associated with reflux-like symptoms hence will trial omeprazole.  -If continues to have feeling of fullness in throat we will pursue an ultrasound/x-ray of the neck.  - omeprazole (PRILOSEC) 20 MG delayed-release capsule; Take 1 Capsule by mouth every day.  Dispense: 30 Capsule; Refill: 1     4. CKD (chronic kidney disease) stage 2, GFR 60-89 ml/min  -Was stage III, on latest labs has come down to stage II  -Does have proteinuria, lisinopril dose being increased today.  -In future visit we will consider SGLT2 inhibitor addition once sugars are slightly better controlled on the insulin     5. Dyslipidemia  -Patient does have significant improvement in LDL compared to last labs, continue atorvastatin 40 mg as well as lifestyle changes including weight loss dietary modification exercise     6. Hypothyroidism, unspecified type  -TSH checked in September within normal meds, continue levothyroxine at 50 mcg  -To recheck in 3 months     7. Substance abuse (HCC)  -Currently only smoking cannabis/cigarettes occasionally, not inhaling methamphetamine as per patient, congratulated on this  -Continue to address at every visit     No orders of the defined types were placed in this encounter.      No follow-ups on file.    This note was created using voice recognition software.  While every attempt is made to ensure accuracy of transcription, occasionally errors occur.

## 2023-10-04 ENCOUNTER — APPOINTMENT (OUTPATIENT)
Dept: INTERNAL MEDICINE | Facility: OTHER | Age: 53
End: 2023-10-04
Payer: COMMERCIAL

## 2023-10-05 RX ORDER — GLUCOSAMINE HCL/CHONDROITIN SU 500-400 MG
CAPSULE ORAL
Qty: 100 EACH | Refills: 0 | Status: SHIPPED | OUTPATIENT
Start: 2023-10-05

## 2023-10-05 NOTE — TELEPHONE ENCOUNTER
Placed refill.    Cheek-To-Nose Interpolation Flap Text: A decision was made to reconstruct the defect utilizing an interpolation axial flap and a staged reconstruction.  A telfa template was made of the defect.  This telfa template was then used to outline the Cheek-To-Nose Interpolation flap.  The donor area for the pedicle flap was then injected with anesthesia.  The flap was excised through the skin and subcutaneous tissue down to the layer of the underlying musculature.  The interpolation flap was carefully excised within this deep plane to maintain its blood supply.  The edges of the donor site were undermined.   The donor site was closed in a primary fashion.  The pedicle was then rotated into position and sutured.  Once the tube was sutured into place, adequate blood supply was confirmed with blanching and refill.  The pedicle was then wrapped with xeroform gauze and dressed appropriately with a telfa and gauze bandage to ensure continued blood supply and protect the attached pedicle.

## 2023-10-05 NOTE — PROGRESS NOTES
Chief Complaint   Patient presents with    Follow-Up     Insulin check up, eye sight has gotten bad       HPI: Isa Espinoza is a 52 y.o. female with past medical history as below who presented to the clinic  for the following     *Blurry vision.  -Since last 2 weeks, patient had seen eye doctor in August, received new prescription for reading glasses however with reading glasses the blurriness is slightly better, has however significantly got worse over the past 2 weeks.  Not associated any eye pain, redness and is bilateral.  Denies any floaters flashes, .  Does mention some headaches lately that is nonspecific.  Does have dry eyes, is not using any lubricant at this time.  Positive soda dizziness a couple times, none present since the past 2 days, nausea she has 2 days ago resolved.    *Type DM   - Aic of 6.7 in jUly 2022 , was on metformin 500 SR, > march, 2023 was  8> September 2023> 14   -Started on insulin 8 units, taking metformin 2000 mg regularly, also patient mentions that she takes an extra tablet of metformin sometimes.  Patient continues to take lisinopril however she is taking 10 mg at this time.  - positive for nocturia, however has decreased in frequency compared to before, patient has been checking her blood sugars at home which has been ranging mostly to 70s to 350s.  -Patient regularly sees optometrist, however with new onset blurry vision bilaterally for the past 2 weeks, yet to see optometrist  -denies Any neuropathy, monofilament test in clinic  last visit  was negative.  -albumin creatinine ratio showing proteinuria= 85, renal function 15/1.03 with egfr 63 of , with UA showing ketones, protein glucose, high specific gravity, squamous epithelial cell, hyaline cast and few bacteria with no burning urgency, fevers chills, flank pain at this time     -Denies any constipation. Does feel nauseous sometimes last fell 2 days ago, resolved since. Without any bloating. Unable to eat a lot due to  dental issues    --Patient would also like to see a dietician, referral placed on the last visit.  -Patient reports some chest tightness nonradiating, nonexertional, present in the morning when she wakes up which lasts for less than 5 minutes and goes away, feels all the way up to the throat.  Unclear if associated with GERD, patient has not yet started omeprazole.    *Hypertension -  Has history of CVA.  Patient was instructed to increase lisinopril dose to 20 mg, discontinue amlodipine.  However patient has been taking on lisinopril 10 mg, blood pressure today is 154/89, patient states that her blood pressures at home have been good, mostly 130s when checked once however has not been checking regularly given that last visit blood pressures in the clinic were within normal limits.  Has vision changes secondary to her CVA/other- seeing ophthalmologist.uses 2 sets of glasses , mostly uses them at night .  Vision changes as mentioned above          Other problems not discussed on this visit  *Feeling full in throat   -reports that she feels full in throat, no difficulty swallowing, no hoarseness, no choking. Does have reflux and heart burn. Was on omeprazole, not anymore.   -TSH on last labs done in September 2023 wnl.   -On examination - cannot appreciate LN or thyromegaly. Patient does have enlarged tonsils- denies any sore throat.      #Hot flashes   -Not bothering her too much   She sleeps with her window open and carries around a fan everywhere.     #Erythrocytosis   -oxygen saturation of 97%.   -Unclear if patient has sleep apnea. Unsure if she snores.   STOP BANG without that is intermediate, neck circumference not measured. Patient has not had a chance to ask son regarding snoring     #DLD   -ldl of 69 as per most recent labs in September 2023- at goal on atorvastatin 40 and lifestyle changes   , as per labs done on 03/13/2023  MRI does show chronic microvascular ischemic changes      *Preventative  health  -Patient reports that she has received her shingles vaccine  -She reports that she has an appointment for colonoscopy, still awaiting a callback from them to schedule an appointment she will be calling them since she has not heard back from them for 3 weeks.    #Metabolic acidosis  resolved    #Transaminitis   Resolved  *Substance use disorder   -did have intermittent relapses however has been abstinent from meth since last visit and 1 month prior to that atleast as paer patient   -Dental procedure has not been scheduled yet- appointment in december  -Currently smoking marijuana every other day  -States that she smokes a few cigarettes a month  -Social alcohol use.   -Patient had not presented to appointments for a few months after which she presents today.    *Hypothyroidism   -TSH of 26 in 2015, restarted on levothyroxine 150 mcg. With intermittent non adherence, most recent TSH wnl from September 2023    Weight has been up and down, currently down trending . States that her menstrual cycles are intermittently irregular     *History of CVA with substance use at the time   -TIA like symptoms happened  - 2 years ago-in 2021?- when she had slurry speech, vision changes on one eye, was emotional, seen at [facility] with resolution of symptoms in a few hours with recommendation to follow up with [facility] clinic which she did not do at the time due to resolution of symptoms and then forgot about it. Patient was smoking cannabis and also methamphetamine at the time. Patient recently had an eye exam done at [facility] and was told that she has vision changes suggestive of a stroke for which an MRI brain was performed in August 2022 which confirmed the presence of a  old left temporal occipital stroke with chronic microvascular ischemic changes.     Patient currently states that she has worse vision on left eye, however doing well with glasses both for near and far sightedness ( 2 separate glasses) and still  drives. No new vision changes.  Echo cardiogram in April 2021 showed trace mR, AI and Trace TR         *CKD stage 3a> CKD stage 2  As per most recent labs.   albumin creatinine ratio showing proteinuria= 85, renal function 15/1.03 with egfr 63 of , with UA showing ketones, protein glucose, high specific gravity, squamous epithelial cell, hyaline cast and few bacteria with no burning urgency, fevers chills, flank pain at this time           Family History   Problem Relation Age of Onset    Stroke Mother         Patient states taht mother was way older than when she or her sister had stroked    Cancer Mother         States that it was somewhere near stomach which had already spread and she went to hospice upon diagnosis    Stroke Father     Stroke Sister 45        Paralysed neck below    No Known Problems Paternal Aunt       Social History     Socioeconomic History    Marital status:    Tobacco Use    Smoking status: Some Days     Current packs/day: 0.25     Average packs/day: 0.3 packs/day for 29.0 years (7.3 ttl pk-yrs)     Types: Cigarettes    Smokeless tobacco: Former    Tobacco comments:     tried it chewing tobacco but never continued use   Vaping Use    Vaping Use: Never used   Substance and Sexual Activity    Alcohol use: Not Currently     Comment: on special occasions    Drug use: Yes     Frequency: 7.0 times per week     Types: Marijuana, Methamphetamines     Comment: Marijuana daily, methamphetamine use once 3-4 weeks          Patient Active Problem List    Diagnosis Date Noted    GERD (gastroesophageal reflux disease) 10/06/2023    Erythrocytosis 03/19/2023    Dyslipidemia 03/19/2023    Hot flashes 03/19/2023    CKD (chronic kidney disease) stage 2, GFR 60-89 ml/min 02/19/2023    CVA (cerebral vascular accident) ([facility]) 04/27/2022    Blurry vision, bilateral 04/19/2021    Substance abuse ([facility]) 04/19/2021    Hypothyroidism 08/11/2020    Type 2 diabetes mellitus with nephropathy ([facility]) 08/11/2020     Obesity with body mass index 30 or greater 07/13/2017    HTN (hypertension) 04/17/2015    Non-adherence to medical treatment 04/17/2015       Current Outpatient Medications   Medication Sig Dispense Refill    Blood Glucose Test Strips Use one True Metrix strip to test blood sugar once daily . 100 Strip 0    Alcohol Swabs Wipe site with prep pad prior to injection. 100 Each 0    lisinopril (PRINIVIL) 20 MG Tab Take 1 Tablet by mouth every day. 90 Tablet 1    levothyroxine (SYNTHROID) 150 MCG Tab TAKE 1 TABLET BY MOUTH EVERY MORNING ON AN EMPTY STOMACH 90 Tablet 1    Insulin Glargine Solostar 100 UNIT/ML Solution Pen-injector Inject 8 Units under the skin every evening. 8 mL 1    omeprazole (PRILOSEC) 20 MG delayed-release capsule Take 1 Capsule by mouth every day. 30 Capsule 1    aspirin (ASA) 81 MG Chew Tab chewable tablet Chew 1 Tablet every day. 100 Tablet 1    atorvastatin (LIPITOR) 40 MG Tab TAKE 1 TABLET BY MOUTH EVERY EVENING 90 Tablet 1    metFORMIN ER (GLUCOPHAGE XR) 500 MG TABLET SR 24 HR Take 1 Tablet by mouth every day. (Patient taking differently: Take 1,000 mg by mouth 2 times a day.) 120 Tablet 1    amLODIPine (NORVASC) 5 MG Tab TAKE 1 TABLET BY MOUTH EVERY DAY 90 Tablet 1    Blood Glucose Meter Kit Test blood sugar as recommended by provider. True Metrix blood glucose monitoring kit. 1 Kit 0    Lancets Use one True Metrix lancet to test blood sugar once daily . 100 Each 0    Semaglutide,0.25 or 0.5MG/DOS, (OZEMPIC, 0.25 OR 0.5 MG/DOSE,) 2 MG/3ML Solution Pen-injector Inject 0.25 mg under the skin every 7 days. (Patient not taking: Reported on 10/6/2023) 6 mL 1     No current facility-administered medications for this visit.       ROS: As per HPI. Additional pertinent systems as noted below.  Constitutional:  Negative for fever, chills   HENT:  Negative for ear pain, sore throat, runny nose   Eyes:  As in HPI  Cardiovascular:   denies any  palpitations at this time positive as in HPI  Respiratory:   "Negative for cough, sputum production,   Gastrointestinal: Negative for abdominal pain, nausea, vomiting, , or blood in stools . Positive as in hpi- constipation better   Genitourinary: Negative for dysuria, flank pain and hematuria. Positive for nocturia  Extremities: Negative for leg swelling   Neurologic: Negative for headaches, , seizures, weakness . Positive for blurry vision  as in hpi  Endo/Heme/Allergies: Positive for polydipsia, nocturia.  Denies any polyphagia    BP (!) 154/89 (BP Location: Left arm, Patient Position: Sitting, BP Cuff Size: Adult)   Pulse 73   Temp 36.3 °C (97.3 °F) (Temporal)   Ht 1.6 m (5' 3\")   Wt 111 kg (244 lb 12.8 oz)   BMI 43.36 kg/m²     Physical Exam   Constitutional:  Well developed, well nourished. Not in acute distress   Eyes:  EOMI, Conjunctiva normal, No discharge.   Cardiovascular:  Regular rate and rhythm, No pedal edema, Intact distal pulses   Respiratory: Clear to auscultation bilaterally, No use of accessory muscles    Neurologic: Alert & oriented x 4, monofilament test normal today    Note: I have reviewed all pertinent labs and diagnostic tests associated with this visit with specific comments listed under the assessment and plan below    Assessment and Plan    1. Type 2 diabetes mellitus with nephropathy (HCC)  Continue metformin 2000 mg per day,   Instructed patient to increase insulin from 8 units x 2 units every 2 days based on sugars of more than 200, and follow-up in 1 week to assess.  Will add sglt2 in future once sugars slightly better controlled with insulin.   Educated regarding checking sugars in morning and keeping a log which is to be brought to clinic as well as on hypoglycemia.   -Patient instructed again to increase lisinopril, stop amlodipine.  -Did not discuss regarding nutritionist referral on this visit.-Microalbumin creatinine ratio next in september 2024  -Monofilament test next due in August 2024.  -Patient is following up with optometrist " regularly, discussed regarding seeing them again urgently due to new vision changes  -Patient denies any  tingling or numbness.  -Patient is also on a statin,     2. Primary hypertension  -increasing the dose of lisinopril to 20 mg, and dropping amlodipine.  Provided instructions to patient, instructed to keep a log of blood pressures and bring to next visit    3. Blurry vision, bilateral  -Patient does suffer from dry eyes, instructed to use lubricating eyedrops.  -Also instructed to follow-up with optometrist given acute changes in vision, less concerning given bilateral presentation.    3. Gastroesophageal reflux disease without esophagitis  -Patient description of symptoms are associated with reflux-like symptoms hence will trial omeprazole.  -If continues to have feeling of fullness in throat we will pursue an ultrasound/x-ray of the neck.  - omeprazole (PRILOSEC) 20 MG delayed-release capsule; Take 1 Capsule by mouth every day.  Dispense: 30 Capsule; Refill: 1    4. Gastroesophageal reflux disease without esophagitis  -Encourage patient to try omeprazole to see if the chest discomfort she feels in the morning resolves along with heartburn and discomfort in the throat  -Patient is yet to start, will check with pharmacy regarding prescription.    Followup: Return in about 1 week (around 10/13/2023).        Signed by: Kris White M.D.

## 2023-10-06 ENCOUNTER — OFFICE VISIT (OUTPATIENT)
Dept: INTERNAL MEDICINE | Facility: OTHER | Age: 53
End: 2023-10-06
Payer: COMMERCIAL

## 2023-10-06 VITALS
DIASTOLIC BLOOD PRESSURE: 89 MMHG | HEART RATE: 73 BPM | TEMPERATURE: 97.3 F | SYSTOLIC BLOOD PRESSURE: 154 MMHG | WEIGHT: 244.8 LBS | HEIGHT: 63 IN | BODY MASS INDEX: 43.38 KG/M2

## 2023-10-06 DIAGNOSIS — H53.8 BLURRY VISION, BILATERAL: ICD-10-CM

## 2023-10-06 DIAGNOSIS — I10 PRIMARY HYPERTENSION: ICD-10-CM

## 2023-10-06 DIAGNOSIS — K21.9 GASTROESOPHAGEAL REFLUX DISEASE WITHOUT ESOPHAGITIS: ICD-10-CM

## 2023-10-06 DIAGNOSIS — E11.21 TYPE 2 DIABETES MELLITUS WITH NEPHROPATHY (HCC): ICD-10-CM

## 2023-10-06 PROCEDURE — 3077F SYST BP >= 140 MM HG: CPT | Performed by: INTERNAL MEDICINE

## 2023-10-06 PROCEDURE — 3079F DIAST BP 80-89 MM HG: CPT | Performed by: INTERNAL MEDICINE

## 2023-10-06 PROCEDURE — 99214 OFFICE O/P EST MOD 30 MIN: CPT | Performed by: INTERNAL MEDICINE

## 2023-10-06 NOTE — PATIENT INSTRUCTIONS
Aspirin was prescribed on September 1 - please check with pharmacy   New medication - once a week - ozempic - check with pharmacy   Omeprazole - heart burn medication - check with pharmacy   Check your blood pressures. Continue lisinopril 20 mg not 10 mg   Do not take more than 2000 mg of metformin.   Increase the insulin by 2 units every 2 days if the sugars are higher than 200.   Follow up with eye doctor.   Bivalent COVID - booster.       Follow up in one week

## 2023-10-15 ENCOUNTER — HOSPITAL ENCOUNTER (EMERGENCY)
Facility: MEDICAL CENTER | Age: 53
End: 2023-10-15
Attending: EMERGENCY MEDICINE
Payer: COMMERCIAL

## 2023-10-15 VITALS
RESPIRATION RATE: 17 BRPM | BODY MASS INDEX: 44.02 KG/M2 | DIASTOLIC BLOOD PRESSURE: 104 MMHG | TEMPERATURE: 98 F | SYSTOLIC BLOOD PRESSURE: 150 MMHG | OXYGEN SATURATION: 98 % | WEIGHT: 239.2 LBS | HEIGHT: 62 IN | HEART RATE: 66 BPM

## 2023-10-15 DIAGNOSIS — K02.9 DENTAL CARIES: ICD-10-CM

## 2023-10-15 PROCEDURE — 99282 EMERGENCY DEPT VISIT SF MDM: CPT

## 2023-10-15 RX ORDER — IBUPROFEN 600 MG/1
600 TABLET ORAL EVERY 6 HOURS PRN
Qty: 30 TABLET | Refills: 0 | Status: SHIPPED | OUTPATIENT
Start: 2023-10-15 | End: 2024-01-10

## 2023-10-15 RX ORDER — AMOXICILLIN AND CLAVULANATE POTASSIUM 875; 125 MG/1; MG/1
1 TABLET, FILM COATED ORAL 2 TIMES DAILY
Qty: 14 TABLET | Refills: 0 | Status: ACTIVE | OUTPATIENT
Start: 2023-10-15 | End: 2023-10-22

## 2023-10-15 NOTE — DISCHARGE INSTRUCTIONS
Take all your antibiotics until gone.  Return the emergency department if you have facial swelling, difficulty breathing, difficulty swallowing or fever that will not go down with Tylenol or ibuprofen.  You need to see a dentist as soon as possible the antibiotics are just temporary.

## 2023-10-15 NOTE — ED TRIAGE NOTES
"Isa Espinoza  53 y.o.  female ambulatory to triage for     Chief Complaint   Patient presents with    Ear Pain    Jaw Pain     Pt reports pain R sided x 3 days.  Pt feels she has an ear infection.  Pt reports dental issues as well but feels pain is more in her ear and tonsil.  Pt reports hx HTN and states current BP is \"low for me\".  Pt using drops in R ear from [place] without relief.  No other complaints, no recent fevers.  NAD  BP (!) 162/102   Pulse 66   Temp 36 °C (96.8 °F) (Temporal)   Resp 16   Ht 1.575 m (5' 2\")   Wt 109 kg (239 lb 3.2 oz)   LMP 03/30/2011   SpO2 97%   BMI 43.75 kg/m²      "

## 2023-10-15 NOTE — ED PROVIDER NOTES
ER Provider Note    Scribed for Uche Celeste M.d. by Arsh Matson. 10/15/2023  2:06 PM    Primary Care Provider: Kris White M.D.    CHIEF COMPLAINT  Chief Complaint   Patient presents with    Ear Pain    Jaw Pain     EXTERNAL RECORDS REVIEWED  Outpatient Notes patient seen October 6, 2023 for an office visit for routine maintenance     HPI/ROS  LIMITATION TO HISTORY   Select: : None  OUTSIDE HISTORIAN(S):  None.    Isa Espinoza is a 53 y.o. female who presents to the ED complaining of right ear pain and jaw pain onset three days ago. Patient reports that she believes to have an ear infection at this time due to her ear pain, which she has treated with drops provided by her daughter. She reports that those drops helped, but she is still experiencing some ear pain. She adds that she may have an infection to her teeth. Her oral pain is exacerbated when eating. Patient does not see a dentist regularly, but has an appointment to see one in December.     PAST MEDICAL HISTORY  Past Medical History:   Diagnosis Date    Hypertension     Metabolic acidosis 3/19/2023    Thyroid disorder     Transaminitis 3/19/2023    Type II or unspecified type diabetes mellitus without mention of complication, not stated as uncontrolled        SURGICAL HISTORY  No past surgical history on file.    FAMILY HISTORY  Family History   Problem Relation Age of Onset    Stroke Mother         Patient states taht mother was way older than when she or her sister had stroked    Cancer Mother         States that it was somewhere near stomach which had already spread and she went to hospice upon diagnosis    Stroke Father     Stroke Sister 45        Paralysed neck below    No Known Problems Paternal Aunt        SOCIAL HISTORY   reports that she has been smoking cigarettes. She has a 7.3 pack-year smoking history. She has quit using smokeless tobacco. She reports that she does not currently use alcohol. She reports current drug use.  "Frequency: 7.00 times per week. Drugs: Marijuana and Methamphetamines.    CURRENT MEDICATIONS  Previous Medications    ALCOHOL SWABS    Wipe site with prep pad prior to injection.    AMLODIPINE (NORVASC) 5 MG TAB    TAKE 1 TABLET BY MOUTH EVERY DAY    ASPIRIN (ASA) 81 MG CHEW TAB CHEWABLE TABLET    Chew 1 Tablet every day.    ATORVASTATIN (LIPITOR) 40 MG TAB    TAKE 1 TABLET BY MOUTH EVERY EVENING    BLOOD GLUCOSE METER KIT    Test blood sugar as recommended by provider. True Metrix blood glucose monitoring kit.    BLOOD GLUCOSE TEST STRIPS    Use one True Metrix strip to test blood sugar once daily .    INSULIN GLARGINE SOLOSTAR 100 UNIT/ML SOLUTION PEN-INJECTOR    Inject 8 Units under the skin every evening.    LANCETS    Use one True Metrix lancet to test blood sugar once daily .    LEVOTHYROXINE (SYNTHROID) 150 MCG TAB    TAKE 1 TABLET BY MOUTH EVERY MORNING ON AN EMPTY STOMACH    LISINOPRIL (PRINIVIL) 20 MG TAB    Take 1 Tablet by mouth every day.    METFORMIN ER (GLUCOPHAGE XR) 500 MG TABLET SR 24 HR    Take 1 Tablet by mouth every day.    OMEPRAZOLE (PRILOSEC) 20 MG DELAYED-RELEASE CAPSULE    Take 1 Capsule by mouth every day.    SEMAGLUTIDE,0.25 OR 0.5MG/DOS, (OZEMPIC, 0.25 OR 0.5 MG/DOSE,) 2 MG/3ML SOLUTION PEN-INJECTOR    Inject 0.25 mg under the skin every 7 days.       ALLERGIES  No known drug allergy    PHYSICAL EXAM  BP (!) 162/102   Pulse 66   Temp 36 °C (96.8 °F) (Temporal)   Resp 16   Ht 1.575 m (5' 2\")   Wt 109 kg (239 lb 3.2 oz)   LMP 03/30/2011   SpO2 97%   BMI 43.75 kg/m²   Constitutional: Well developed, Well nourished, mild distress.   HENT: Normocephalic, Atraumatic, Oropharynx moist, No oral exudates. Multiple dental carries without signs of led wigs. Tooth one and two are missing, teeth 3 and 4 have dental carries down to the gum line, no abscess. Tooth 32 is missing, and teeth 31 and 30 have dental carries down to the gum. Right TM is clear with some cerumen in the canal.   Eyes: " Conjunctiva normal, No discharge.   Neck: Supple, No stridor.  Cardiovascular: Normal heart rate, Normal rhythm, No murmurs, equal pulses.   Pulmonary: Normal breath sounds, No respiratory distress, No wheezing, No rales, No rhonchi.  Chest: No chest wall tenderness or deformity.   Abdomen:Soft, No tenderness, No masses, no rebound, no guarding.   Skin: Warm, Dry, No erythema, No rash.   Neurologic: Alert & oriented x 3, Normal motor function,  No focal deficits noted.   Psychiatric: Affect normal, Judgment normal, Mood normal.      COURSE & MEDICAL DECISION MAKING     ED Observation Status? No; Patient does not meet criteria for ED Observation.     INITIAL ASSESSMENT, COURSE AND PLAN  Care Narrative:     2:12 PM - Patient seen and examined at bedside. Patient is a 53 year old female who presents today for evaluation of jaw pain and ear pain onset four days ago. See HPI for further details. Discussed plan of care, including treating her pain today and a referral to dentistry. Patient agrees to the plan of care. Differential diagnoses include but not limited to: Otitis media, referred pain from dental carries. I discussed plan for discharge and follow up as outlined below. The patient is stable for discharge at this time and will return for any new or worsening symptoms. Patient verbalizes understanding and support with my plan for discharge.      PROBLEM LIST  Problem #1 dental caries - patient presents with ear pain at this point time I think his ear pain is secondary to referred pain from her multiple dental caries.  Patient likely does not show any signs of Jessica's angina.  There is no drainable abscess.  Discussed with her that this is just a temporizing measure and that she is going to need to see a dentist to have her dental caries addressed.     Problem #2 ear pain patient TM clear no signs of otitis media.  I think this is just referred pain.    DISPOSITION AND DISCUSSIONS  I have discussed management of  the patient with the following physicians and SANDRA's:  None    Discussion of management with other [facility] or appropriate source(s): None     Escalation of care considered, and ultimately not performed: diagnostic imaging.  No obvious signs of abscess I do not think imaging is warranted.    Barriers to care at this time, including but not limited to:  None are known at this time. .     Decision tools and prescription drugs considered including, but not limited to: Antibiotics Augmentin .    The patient will return for new or worsening symptoms and is stable at the time of discharge.    DISPOSITION:  Patient will be discharged home in stable condition.    FOLLOW UP:  See a dentist    Schedule an appointment as soon as possible for a visit       [address] [place]  [facility] [address]  [phone]    If you need a doctor or dentist      OUTPATIENT MEDICATIONS:  New Prescriptions    AMOXICILLIN-CLAVULANATE (AUGMENTIN) 875-125 MG TAB    Take 1 Tablet by mouth 2 times a day for 7 days.    IBUPROFEN (MOTRIN) 600 MG TAB    Take 1 Tablet by mouth every 6 hours as needed for Moderate Pain.        FINAL DIANGOSIS  1. Dental caries           The note accurately reflects work and decisions made by me.  Uche Celeste M.D.  10/15/2023  2:25 PM     Arsh MESA (Ashwinibmilagro), am scribing for, and in the presence of, KARLEY Velasco*.    Electronically signed by: Arsh Matson (Alpa), 10/15/2023    Uche MESA M.* personally performed the services described in this documentation, as scribed by Arsh Matson in my presence, and it is both accurate and complete.

## 2023-10-15 NOTE — ED NOTES
Pt discharged home. GCS 15. Pt in possession of belongings. Pt provided discharge education and information pertaining to medications and follow up appointments. Pt received copy of discharge instructions and verbalized understanding.     Vitals:    10/15/23 1416   BP: (!) 150/104   Pulse: 66   Resp: 17   Temp: 36.7 °C (98 °F)   SpO2: 98%

## 2023-10-15 NOTE — ED NOTES
Taken patient from triage waiting room, ambulatory with steady gait, alert/ oriented x 4.Verified patient identification.  Assumed patient care.   Placed on patient room.  Connected to cardiac monitor.   Given the call light and instructed to call for any assistance needed/ or concerns.   Bed on lowest position, side rails up, breaks locked. Awaiting for ERP.

## 2023-10-17 NOTE — PROGRESS NOTES
Chief Complaint   Patient presents with    Diabetes       HPI: Isa Espinoza is a 52 y.o. female with past medical history as below who presented to the clinic  for the following     *Blurry vision.  -Since last 3 weeks, patient had seen eye doctor in August, received new prescription for reading glasses however with reading glasses the blurriness is slightly better, has however significantly got worse over the past 3 weeks.  Not associated any eye pain, redness and is bilateral.  Denies any floaters flashes, .  Does mention some headaches lately that is nonspecific.  Does have dry eyes, is not using any lubricant at this time.  Positive soda dizziness is intermittent.   -She has yet to see optometrist/ophthalmologist will call them today.    *Type DM   - Aic of 6.7 in jUly 2022 , was on metformin 500 SR, > march, 2023 was  8> September 2023> 14   -Started on insulin 8 units, taking metformin 2000 mg regularly, patient is currently taking 10 units of insulin glargine at nighttime, she did not receive her test strips until 2 days ago, has been checking her fasting glucose yesterday and today which has been ranging in 230s to 260s.  Patient has not increased her insulin dose yet given that she just got her test strips.  Has not received semaglutide prescription yet.  Patient continues to take lisinopril however she is taking 10 mg at this time.  - positive for nocturia, however has decreased in frequency compared to before,   -Patient regularly sees optometrist, however with new onset blurry vision bilaterally for the past 3 weeks, yet to see optometrist  -denies Any neuropathy, monofilament test in clinic  last visit  was negative.  -albumin creatinine ratio showing proteinuria= 85, renal function 15/1.03 with egfr 63 of , with UA showing ketones, protein glucose, high specific gravity, squamous epithelial cell, hyaline cast and few bacteria with no burning urgency, fevers chills, flank pain at this time      -Denies any constipation. . Unable to eat a lot due to dental issues    --Patient would also like to see a dietician, referral placed on the last visit.  Patient has not contacted them yet  -Patient reports some chest tightness nonradiating, nonexertional, present in the morning when she wakes up which lasts for less than 5 minutes and goes away, feels all the way up to the throat.  Unclear if associated with GERD, patient has not yet started omeprazole.  Has not received prescription yet    *Hypertension -  Has history of CVA.  Patient was instructed to increase lisinopril dose to 20 mg, discontinue amlodipine on the last visit.  She has not been checking her blood pressures at home   has vision changes secondary to her CVA/other- seeing ophthalmologist.uses 2 sets of glasses , mostly uses them at night .  Vision changes as mentioned above    *Recent ER visit for dental infection.  -Was currently on a 14-day course of Augmentin, will call dentist as soon as possible    #DLD   -ldl of 69 as per most recent labs in September 2023- at goal on atorvastatin 40 and lifestyle changes   , as per labs done on 03/13/2023  MRI does show chronic microvascular ischemic changes            Other problems not discussed on this visit  *Feeling full in throat   -reports that she feels full in throat, no difficulty swallowing, no hoarseness, no choking. Does have reflux and heart burn. Was on omeprazole, not anymore.   -TSH on last labs done in September 2023 wnl.   -On examination - cannot appreciate LN or thyromegaly. Patient does have enlarged tonsils- denies any sore throat.      #Hot flashes   -Not bothering her too much   She sleeps with her window open and carries around a fan everywhere.     #Erythrocytosis   -oxygen saturation of 97%.   -Unclear if patient has sleep apnea. Unsure if she snores.   STOP BANG without that is intermediate, neck circumference not measured. Patient has not had a chance to ask son regarding  snoring       *Preventative health  -Patient reports that she has received her shingles vaccine  -She reports that she has an appointment for colonoscopy, still awaiting a callback from them to schedule an appointment she will be calling them since she has not heard back from them for 3 weeks.    #Metabolic acidosis  resolved    #Transaminitis   Resolved  *Substance use disorder   -did have intermittent relapses however has been abstinent from meth since last visit and 1 month prior to that atleast as paer patient   -Dental procedure has not been scheduled yet- appointment in december  -Currently smoking marijuana every other day  -States that she smokes a few cigarettes a month  -Social alcohol use.   -Patient had not presented to appointments for a few months after which she presents today.    *Hypothyroidism   -TSH of 26 in 2015, restarted on levothyroxine 150 mcg. With intermittent non adherence, most recent TSH wnl from September 2023    Weight has been up and down, currently down trending . States that her menstrual cycles are intermittently irregular     *History of CVA with substance use at the time   -TIA like symptoms happened  - 2 years ago-in 2021?- when she had slurry speech, vision changes on one eye, was emotional, seen at [facility] with resolution of symptoms in a few hours with recommendation to follow up with [facility] clinic which she did not do at the time due to resolution of symptoms and then forgot about it. Patient was smoking cannabis and also methamphetamine at the time. Patient recently had an eye exam done at [facility] and was told that she has vision changes suggestive of a stroke for which an MRI brain was performed in August 2022 which confirmed the presence of a  old left temporal occipital stroke with chronic microvascular ischemic changes.     Patient currently states that she has worse vision on left eye, however doing well with glasses both for near and far sightedness ( 2  separate glasses) and still drives. No new vision changes.  Echo cardiogram in April 2021 showed trace mR, AI and Trace TR         *CKD stage 3a> CKD stage 2  As per most recent labs.   albumin creatinine ratio showing proteinuria= 85, renal function 15/1.03 with egfr 63 of , with UA showing ketones, protein glucose, high specific gravity, squamous epithelial cell, hyaline cast and few bacteria with no burning urgency, fevers chills, flank pain at this time              Family History   Problem Relation Age of Onset    Stroke Mother         Patient states taht mother was way older than when she or her sister had stroked    Cancer Mother         States that it was somewhere near stomach which had already spread and she went to hospice upon diagnosis    Stroke Father     Stroke Sister 45        Paralysed neck below    No Known Problems Paternal Aunt       Social History     Socioeconomic History    Marital status:    Tobacco Use    Smoking status: Some Days     Current packs/day: 0.25     Average packs/day: 0.3 packs/day for 29.0 years (7.3 ttl pk-yrs)     Types: Cigarettes    Smokeless tobacco: Former    Tobacco comments:     tried it chewing tobacco but never continued use   Vaping Use    Vaping Use: Never used   Substance and Sexual Activity    Alcohol use: Yes     Comment: on special occasions    Drug use: Yes     Frequency: 7.0 times per week     Types: Marijuana, Methamphetamines     Comment: Marijuana daily, methamphetamine use once 3-4 weeks          Patient Active Problem List    Diagnosis Date Noted    Dental infection 10/19/2023    GERD (gastroesophageal reflux disease) 10/06/2023    Erythrocytosis 03/19/2023    Dyslipidemia 03/19/2023    Hot flashes 03/19/2023    CKD (chronic kidney disease) stage 2, GFR 60-89 ml/min 02/19/2023    CVA (cerebral vascular accident) (HCC) 04/27/2022    Blurry vision, bilateral 04/19/2021    Substance abuse in remission (HCC) 04/19/2021    Hypothyroidism 08/11/2020     Type 2 diabetes mellitus with nephropathy (HCC) 08/11/2020    Obesity with body mass index 30 or greater 07/13/2017    HTN (hypertension) 04/17/2015    Non-adherence to medical treatment 04/17/2015       Current Outpatient Medications   Medication Sig Dispense Refill    Nutritional Supplements (GLUCOSE MANAGEMENT) Tab Take 4 Tablets by mouth as needed (for hypoglycemia). 15 Tablet 1    lisinopril (PRINIVIL) 30 MG tablet Take 1 Tablet by mouth every day. 90 Tablet 1    liraglutide (VICTOZA) 18 MG/3ML Solution Pen-injector Inject 0.6 mg under the skin every day for 7 days, THEN 1.2 mg every day for 81 days. 6 mL 1    Blood Glucose Test Strips Test strips order: Test strips for One Touch Verio meter. Sig: use once and prn ssx high or low sugar #100 RF x 3 100 Strip 3    amoxicillin-clavulanate (AUGMENTIN) 875-125 MG Tab Take 1 Tablet by mouth 2 times a day for 7 days. 14 Tablet 0    Alcohol Swabs Wipe site with prep pad prior to injection. 100 Each 0    levothyroxine (SYNTHROID) 150 MCG Tab TAKE 1 TABLET BY MOUTH EVERY MORNING ON AN EMPTY STOMACH 90 Tablet 1    Insulin Glargine Solostar 100 UNIT/ML Solution Pen-injector Inject 8 Units under the skin every evening. 8 mL 1    aspirin (ASA) 81 MG Chew Tab chewable tablet Chew 1 Tablet every day. 100 Tablet 1    metFORMIN ER (GLUCOPHAGE XR) 500 MG TABLET SR 24 HR Take 1 Tablet by mouth every day. (Patient taking differently: Take 1,000 mg by mouth 2 times a day.) 120 Tablet 1    Blood Glucose Meter Kit Test blood sugar as recommended by provider. True Metrix blood glucose monitoring kit. 1 Kit 0    ibuprofen (MOTRIN) 600 MG Tab Take 1 Tablet by mouth every 6 hours as needed for Moderate Pain. (Patient not taking: Reported on 10/18/2023) 30 Tablet 0    Lancets Use one True Metrix lancet to test blood sugar once daily . 100 Each 1    omeprazole (PRILOSEC) 20 MG delayed-release capsule Take 1 Capsule by mouth every day. (Patient not taking: Reported on 10/18/2023) 30 Capsule  "1    atorvastatin (LIPITOR) 40 MG Tab TAKE 1 TABLET BY MOUTH EVERY EVENING 90 Tablet 1     No current facility-administered medications for this visit.       ROS: As per HPI. Additional pertinent systems as noted below.  Constitutional:  Negative for fever, chills   HENT:  Negative for ear pain, sore throat, runny nose   Eyes:  As in HPI  Cardiovascular:   denies any  palpitations at this time positive as in HPI  Respiratory:  Negative for cough, sputum production,   Gastrointestinal: Negative for abdominal pain, nausea, vomiting, , or blood in stools . Positive as in hpi- constipation better   Genitourinary: Negative for dysuria, flank pain and hematuria. Positive for nocturia  Extremities: Negative for leg swelling   Neurologic: Negative for headaches, , seizures, weakness . Positive for blurry vision  as in hpi  Endo/Heme/Allergies: Positive for polydipsia, nocturia.  Denies any polyphagia    BP (!) 161/95 (BP Location: Left arm, Patient Position: Sitting, BP Cuff Size: Large adult)   Pulse 71   Temp 36.8 °C (98.2 °F) (Temporal)   Ht 1.607 m (5' 3.25\")   Wt 111 kg (244 lb 3.2 oz)   LMP 03/30/2011   SpO2 95%   BMI 42.92 kg/m²     Physical Exam   Constitutional:  Well developed, well nourished. Not in acute distress   Eyes:  EOMI, Conjunctiva normal, No discharge.   Cardiovascular:  Regular rate and rhythm, No pedal edema, Intact distal pulses   Respiratory: Clear to auscultation bilaterally, No use of accessory muscles    Neurologic: Alert & oriented x 4, monofilament test normal today    Note: I have reviewed all pertinent labs and diagnostic tests associated with this visit with specific comments listed under the assessment and plan below    Assessment and Plan    1. Type 2 diabetes mellitus with nephropathy (HCC)  Continue metformin 2000 mg per day,   Instructed patient to increase insulin from 10 units x 2 units for next 2 days and then on the third day of higher than 200 to increase it to 12 units, " follow-up in 2 weeks to reassess.  Will add sglt2 in future once sugars slightly better controlled with insulin.   -Called pharmacy regarding semaglutide, requiring prior Auth, however Victoza is covered, prescription for Victoza placed today.  -Prescription for glucose tabs placed as well.  -Also call pharmacy regarding atorvastatin prescription, it was on hold and now has been activated and patient can pick it up  Educated regarding checking sugars in morning and keeping a log which is to be brought to clinic as well as on hypoglycemia.   -Patient instructed to increase lisinopril from 20 mg to 30 mg  -Patient is yet to call nutritionist to set up appointment  -Microalbumin creatinine ratio next in september 2024  -Monofilament test next due in August 2024.  -Patient is following up with optometrist regularly, discussed regarding seeing them again urgently due to new vision changes.  I am also placing ophthalmology referral given history of CVA, diabetes  -Patient denies any  tingling or numbness.      2. Primary hypertension  -increasing the dose of lisinopril to 30 mg, .  Provided instructions to patient, instructed to keep a log of blood pressures and bring to next visit    3. Blurry vision, bilateral  -Patient does suffer from dry eyes, instructed to use lubricating eyedrops.  -Also instructed to follow-up with optometrist given acute changes in vision, less concerning given bilateral presentation.  Referral to ophthalmology also placed on this visit    3. Gastroesophageal reflux disease without esophagitis  -Patient description of symptoms are associated with reflux-like symptoms hence will trial omeprazole.  -If continues to have feeling of fullness in throat we will pursue an ultrasound/x-ray of the neck.  -Patient has not received prescription for omeprazole, called pharmacy prescription as has already been picked up by patient as per pharmacy on 10/15        Followup: Return in about 2 weeks (around  11/1/2023).        Signed by: Kris White M.D.

## 2023-10-18 ENCOUNTER — OFFICE VISIT (OUTPATIENT)
Dept: INTERNAL MEDICINE | Facility: OTHER | Age: 53
End: 2023-10-18
Payer: COMMERCIAL

## 2023-10-18 VITALS
DIASTOLIC BLOOD PRESSURE: 95 MMHG | HEART RATE: 71 BPM | OXYGEN SATURATION: 95 % | TEMPERATURE: 98.2 F | SYSTOLIC BLOOD PRESSURE: 161 MMHG | WEIGHT: 244.2 LBS | HEIGHT: 63 IN | BODY MASS INDEX: 43.27 KG/M2

## 2023-10-18 DIAGNOSIS — H53.8 BLURRY VISION, BILATERAL: ICD-10-CM

## 2023-10-18 DIAGNOSIS — I10 PRIMARY HYPERTENSION: ICD-10-CM

## 2023-10-18 DIAGNOSIS — Z91.89 AT RISK FOR HYPOGLYCEMIA: ICD-10-CM

## 2023-10-18 DIAGNOSIS — E78.5 DYSLIPIDEMIA: ICD-10-CM

## 2023-10-18 DIAGNOSIS — E11.21 TYPE 2 DIABETES MELLITUS WITH NEPHROPATHY (HCC): ICD-10-CM

## 2023-10-18 DIAGNOSIS — K04.7 DENTAL INFECTION: ICD-10-CM

## 2023-10-18 PROCEDURE — 3077F SYST BP >= 140 MM HG: CPT | Performed by: INTERNAL MEDICINE

## 2023-10-18 PROCEDURE — 99214 OFFICE O/P EST MOD 30 MIN: CPT | Performed by: INTERNAL MEDICINE

## 2023-10-18 PROCEDURE — 3080F DIAST BP >= 90 MM HG: CPT | Performed by: INTERNAL MEDICINE

## 2023-10-18 RX ORDER — LISINOPRIL 30 MG/1
30 TABLET ORAL DAILY
Qty: 90 TABLET | Refills: 1 | Status: SHIPPED | OUTPATIENT
Start: 2023-10-18 | End: 2024-01-10 | Stop reason: SDUPTHER

## 2023-10-19 PROBLEM — K04.7 DENTAL INFECTION: Status: ACTIVE | Noted: 2023-10-19

## 2023-10-19 PROBLEM — F19.11 SUBSTANCE ABUSE IN REMISSION (HCC): Status: ACTIVE | Noted: 2021-04-19

## 2023-10-23 ENCOUNTER — TELEPHONE (OUTPATIENT)
Dept: INTERNAL MEDICINE | Facility: OTHER | Age: 53
End: 2023-10-23
Payer: COMMERCIAL

## 2023-10-23 DIAGNOSIS — N18.2 CKD (CHRONIC KIDNEY DISEASE) STAGE 2, GFR 60-89 ML/MIN: ICD-10-CM

## 2023-10-23 DIAGNOSIS — E66.9 OBESITY WITH BODY MASS INDEX 30 OR GREATER: ICD-10-CM

## 2023-10-23 DIAGNOSIS — I63.9 CEREBROVASCULAR ACCIDENT (CVA), UNSPECIFIED MECHANISM (HCC): ICD-10-CM

## 2023-10-23 DIAGNOSIS — E11.21 TYPE 2 DIABETES MELLITUS WITH NEPHROPATHY (HCC): ICD-10-CM

## 2023-10-23 NOTE — TELEPHONE ENCOUNTER
DOCUMENTATION OF PAR STATUS:    1. Name of Medication & Dose: Victoza 18MG/3ML pen-injectors     2. Name of Prescription Coverage Company & phone #: [employer]    3. Date Prior Auth Submitted: 10/23/23    4. What information was given to obtain insurance decision? ICD-10, medications tried, last visit notes    5. Prior Auth Status? Pending    6. Patient Notified: yes

## 2023-10-25 NOTE — TELEPHONE ENCOUNTER
FINAL PRIOR AUTHORIZATION STATUS:    1.  Name of Medication & Dose: Victoza 18mg/3ml     2. Prior Auth Status: Denied.  Reason: Patient must try and fail all alternatives. (Alternatives listed on denial letter scanned into chart.)    3. Action Taken: Pharmacy Notified: yes Patient Notified: yes

## 2023-10-26 RX ORDER — METFORMIN HYDROCHLORIDE 500 MG/1
1000 TABLET, EXTENDED RELEASE ORAL 2 TIMES DAILY
Status: CANCELLED | OUTPATIENT
Start: 2023-10-26

## 2023-10-26 NOTE — TELEPHONE ENCOUNTER
Phone Number Called: [phone]    Call outcome: Did not leave a detailed message. Requested patient to call back.    Message: Left voicemail requesting a call back from patient to discuss new medication

## 2023-10-26 NOTE — TELEPHONE ENCOUNTER
I have placed prescription for jardiance- please let patient know - it hopefully should be covered as it is on list of alternatives. Patient will have to stay well hydrated while on this medication

## 2023-10-29 DIAGNOSIS — E11.21 TYPE 2 DIABETES MELLITUS WITH NEPHROPATHY (HCC): ICD-10-CM

## 2023-10-30 RX ORDER — METFORMIN HYDROCHLORIDE 500 MG/1
1000 TABLET, EXTENDED RELEASE ORAL 2 TIMES DAILY
Qty: 360 TABLET | Refills: 3 | Status: SHIPPED | OUTPATIENT
Start: 2023-10-30 | End: 2023-11-17 | Stop reason: ALTCHOICE

## 2023-11-01 ENCOUNTER — APPOINTMENT (OUTPATIENT)
Dept: INTERNAL MEDICINE | Facility: OTHER | Age: 53
End: 2023-11-01
Payer: COMMERCIAL

## 2023-11-01 NOTE — TELEPHONE ENCOUNTER
Informed patient of Jardiance prescription. Patient states she has picked it up from the pharmacy already.

## 2023-11-03 ENCOUNTER — OFFICE VISIT (OUTPATIENT)
Dept: INTERNAL MEDICINE | Facility: OTHER | Age: 53
End: 2023-11-03
Payer: COMMERCIAL

## 2023-11-03 VITALS
HEIGHT: 63 IN | DIASTOLIC BLOOD PRESSURE: 73 MMHG | OXYGEN SATURATION: 94 % | HEART RATE: 75 BPM | TEMPERATURE: 97.6 F | BODY MASS INDEX: 43.23 KG/M2 | SYSTOLIC BLOOD PRESSURE: 126 MMHG | WEIGHT: 244 LBS

## 2023-11-03 DIAGNOSIS — I63.9 CEREBROVASCULAR ACCIDENT (CVA), UNSPECIFIED MECHANISM (HCC): ICD-10-CM

## 2023-11-03 DIAGNOSIS — E78.5 DYSLIPIDEMIA: ICD-10-CM

## 2023-11-03 DIAGNOSIS — H53.8 BLURRY VISION, BILATERAL: ICD-10-CM

## 2023-11-03 DIAGNOSIS — K04.7 DENTAL INFECTION: ICD-10-CM

## 2023-11-03 DIAGNOSIS — E03.9 HYPOTHYROIDISM, UNSPECIFIED TYPE: ICD-10-CM

## 2023-11-03 DIAGNOSIS — E11.21 TYPE 2 DIABETES MELLITUS WITH NEPHROPATHY (HCC): ICD-10-CM

## 2023-11-03 DIAGNOSIS — Z86.73 HISTORY OF CVA (CEREBROVASCULAR ACCIDENT): ICD-10-CM

## 2023-11-03 DIAGNOSIS — R42 DIZZINESS: ICD-10-CM

## 2023-11-03 DIAGNOSIS — I10 PRIMARY HYPERTENSION: ICD-10-CM

## 2023-11-03 PROCEDURE — 3074F SYST BP LT 130 MM HG: CPT | Performed by: INTERNAL MEDICINE

## 2023-11-03 PROCEDURE — 3078F DIAST BP <80 MM HG: CPT | Performed by: INTERNAL MEDICINE

## 2023-11-03 PROCEDURE — 99214 OFFICE O/P EST MOD 30 MIN: CPT | Performed by: INTERNAL MEDICINE

## 2023-11-03 NOTE — PROGRESS NOTES
Chief Complaint   Patient presents with    Follow-Up       HPI: Isa Espinoza is a 52 y.o. female with past medical history as below who presented to the clinic  for the following     *Type DM   - Aic of 6.7 in jUly 2022 , was on metformin 500 SR, > march, 2023 was  8> September 2023> 14   -Started on insulin 8 units, taking metformin 2000 mg regularly, patient is currently taking 16 units of insulin glargine at nighttime, blood sugars ranging from 131 -231  She had been taking a few extra tablets of metformin - has ran out of it and noticed that the sugars were trending up   - positive for nocturia, however has decreased in frequency compared to before,   -Patient regularly sees optometrist, however with new onset blurry vision as described previously right worse than left - no flashes floaters- requested for follow up with eye doctor- recommended to control sugars better before changing glass prescription   Patient does have new referral for ophthalmology - provided information   -denies Any neuropathy, monofilament test in clinic  last visit  was negative.  -albumin creatinine ratio showing proteinuria= 85, renal function 15/1.03 with egfr 63 of , with UA showing ketones, protein glucose, high specific gravity, squamous epithelial cell, hyaline cast and few bacteria with no burning urgency, fevers chills, flank pain at this time     -Denies any constipation. . Unable to eat a lot due to dental issues    Requesting for antibiotic course for dental infection - unable to see dentist until December   --Patient would also like to see a dietician, referral placed on the last visit.  Patient has not contacted them yet- referral processed - will be following up with them soon         *Blurry vision.  -Since last 1 and half months , patient had seen eye doctor in August, received new prescription for reading glasses however with reading glasses the blurriness is slightly better, has however significantly got worse  suddently  Not associated any eye pain, redness and is bilateral.  Denies any floaters flashes, .  Does mention some headaches lately that is nonspecific.  Does have dry eyes, is not using any lubricant at this time.  Positive for  dizziness is intermittent-separate from vision changes    -Patient regularly sees optometrist, however with new onset blurry vision as described previously right worse than left - no flashes floaters- requested for follow up with eye doctor- recommended to control sugars better before changing glass prescription   Patient does have new referral for ophthalmology - provided information   -provided ER precautions.      *Dizziness   -happens randomly even when sitting - for few seconds - patient has to distract herself - shake it off, take a walk and it goes away . Happened 2-3 times2 weeks ago , once last week . Feels like room spinning   *Does have right sided headache, however unrelated, does have recent vision changes however not present simultaneous with the dizziness.  Reports that her blood pressures and sugars have been normal during these episodes, however patient is unable to tell me the numbers.   Encouraged to check them and write them down   Neuro exam including cerebellar, romberg were all normal   Has not been drinking enough water     *Hypertension -  Has history of CVA.    Well managed with lisinopril 30 mg     *Recent ER visit for dental infection.  -Was currently on a 14-day course of Augmentin, will call dentist as soon as possible- has appointment for December   Requesting for antibiotic course to be repeated - patient understands risks and importance to follow up with dentist     #DLD   -ldl of 69 as per most recent labs in September 2023- at goal on atorvastatin 40 and lifestyle changes   , as per labs done on 03/13/2023  MRI does show chronic microvascular ischemic changes            Other problems not discussed on this visit  *Feeling full in throat   -reports that  she feels full in throat, no difficulty swallowing, no hoarseness, no choking. Does have reflux and heart burn. Was on omeprazole, not anymore.   -TSH on last labs done in September 2023 wnl.   -On examination - cannot appreciate LN or thyromegaly. Patient does have enlarged tonsils- denies any sore throat.      #Hot flashes   -Not bothering her too much   She sleeps with her window open and carries around a fan everywhere.     #Erythrocytosis   -oxygen saturation of 97%.   -Unclear if patient has sleep apnea. Unsure if she snores.   STOP BANG without that is intermediate, neck circumference not measured. Patient has not had a chance to ask son regarding snoring       *Preventative health  -Patient reports that she has received her shingles vaccine  -She reports that she has an appointment for colonoscopy, still awaiting a callback from them to schedule an appointment she will be calling them since she has not heard back from them for 3 weeks.    #Metabolic acidosis  resolved    #Transaminitis   Resolved  *Substance use disorder   -did have intermittent relapses however has been abstinent from meth since last visit and 1 month prior to that atleast as paer patient   -Dental procedure has not been scheduled yet- appointment in december  -Currently smoking marijuana every other day  -States that she smokes a few cigarettes a month  -Social alcohol use.   -Patient had not presented to appointments for a few months after which she presents today.    *Hypothyroidism   -TSH of 26 in 2015, restarted on levothyroxine 150 mcg. With intermittent non adherence, most recent TSH wnl from September 2023    Weight has been up and down, currently down trending . States that her menstrual cycles are intermittently irregular     *History of CVA with substance use at the time   -TIA like symptoms happened  - 2 years ago-in 2021?- when she had slurry speech, vision changes on one eye, was emotional, seen at [facility] with  resolution of symptoms in a few hours with recommendation to follow up with [facility] clinic which she did not do at the time due to resolution of symptoms and then forgot about it. Patient was smoking cannabis and also methamphetamine at the time. Patient recently had an eye exam done at [facility] and was told that she has vision changes suggestive of a stroke for which an MRI brain was performed in August 2022 which confirmed the presence of a  old left temporal occipital stroke with chronic microvascular ischemic changes.     Patient currently states that she has worse vision on left eye, however doing well with glasses both for near and far sightedness ( 2 separate glasses) and still drives. No new vision changes.  Echo cardiogram in April 2021 showed trace mR, AI and Trace TR         *CKD stage 3a> CKD stage 2  As per most recent labs.   albumin creatinine ratio showing proteinuria= 85, renal function 15/1.03 with egfr 63 of , with UA showing ketones, protein glucose, high specific gravity, squamous epithelial cell, hyaline cast and few bacteria with no burning urgency, fevers chills, flank pain at this time              Family History   Problem Relation Age of Onset    Stroke Mother         Patient states taht mother was way older than when she or her sister had stroked    Cancer Mother         States that it was somewhere near stomach which had already spread and she went to hospice upon diagnosis    Stroke Father     Stroke Sister 45        Paralysed neck below    No Known Problems Paternal Aunt       Social History     Socioeconomic History    Marital status:    Tobacco Use    Smoking status: Some Days     Current packs/day: 0.25     Average packs/day: 0.3 packs/day for 29.0 years (7.3 ttl pk-yrs)     Types: Cigarettes    Smokeless tobacco: Former    Tobacco comments:     tried it chewing tobacco but never continued use   Vaping Use    Vaping Use: Never used   Substance and Sexual Activity    Alcohol  use: Yes     Comment: on special occasions    Drug use: Yes     Frequency: 7.0 times per week     Types: Marijuana, Methamphetamines     Comment: Marijuana daily, methamphetamine use once 3-4 weeks          Patient Active Problem List    Diagnosis Date Noted    Dental infection 10/19/2023    GERD (gastroesophageal reflux disease) 10/06/2023    Erythrocytosis 03/19/2023    Dyslipidemia 03/19/2023    Hot flashes 03/19/2023    CKD (chronic kidney disease) stage 2, GFR 60-89 ml/min 02/19/2023    History of CVA (cerebrovascular accident) 04/27/2022    Blurry vision, bilateral 04/19/2021    Substance abuse in remission (HCC) 04/19/2021    Hypothyroidism 08/11/2020    Type 2 diabetes mellitus with nephropathy (HCC) 08/11/2020    Dizziness 08/02/2018    Obesity with body mass index 30 or greater 07/13/2017    HTN (hypertension) 04/17/2015    Non-adherence to medical treatment 04/17/2015       Current Outpatient Medications   Medication Sig Dispense Refill    amoxicillin-clavulanate (AUGMENTIN) 875-125 MG Tab Take 1 Tablet by mouth 2 times a day for 5 days. 10 Tablet 0    aspirin (ASA) 81 MG Chew Tab chewable tablet Chew 1 Tablet every day. 100 Tablet 1    Insulin Glargine Solostar 100 UNIT/ML Solution Pen-injector Inject 20 Units under the skin every evening. 18 mL 1    levothyroxine (SYNTHROID) 150 MCG Tab Take 1 Tablet by mouth every morning on an empty stomach. 90 Tablet 1    metFORMIN ER (GLUCOPHAGE XR) 500 MG TABLET SR 24 HR Take 2 Tablets by mouth 2 times a day. 360 Tablet 3    lisinopril (PRINIVIL) 30 MG tablet Take 1 Tablet by mouth every day. 90 Tablet 1    liraglutide (VICTOZA) 18 MG/3ML Solution Pen-injector Inject 0.6 mg under the skin every day for 7 days, THEN 1.2 mg every day for 81 days. 6 mL 1    Blood Glucose Test Strips Test strips order: Test strips for One Touch Verio meter. Sig: use once and prn ssx high or low sugar #100 RF x 3 100 Strip 3    Lancets Use one True Metrix lancet to test blood sugar once  "daily . 100 Each 1    Alcohol Swabs Wipe site with prep pad prior to injection. 100 Each 0    atorvastatin (LIPITOR) 40 MG Tab TAKE 1 TABLET BY MOUTH EVERY EVENING 90 Tablet 1    Empagliflozin (JARDIANCE) 10 MG Tab tablet Take 1 Tablet by mouth every day. (Patient not taking: Reported on 11/3/2023) 90 Tablet 1    Nutritional Supplements (GLUCOSE MANAGEMENT) Tab Take 4 Tablets by mouth as needed (for hypoglycemia). (Patient not taking: Reported on 11/3/2023) 15 Tablet 1    ibuprofen (MOTRIN) 600 MG Tab Take 1 Tablet by mouth every 6 hours as needed for Moderate Pain. (Patient not taking: Reported on 11/3/2023) 30 Tablet 0    omeprazole (PRILOSEC) 20 MG delayed-release capsule Take 1 Capsule by mouth every day. (Patient not taking: Reported on 10/18/2023) 30 Capsule 1    Blood Glucose Meter Kit Test blood sugar as recommended by provider. True Metrix blood glucose monitoring kit. (Patient not taking: Reported on 11/3/2023) 1 Kit 0     No current facility-administered medications for this visit.       ROS: As per HPI. Additional pertinent systems as noted below.  Constitutional:  Negative for fever, chills   HENT:  Negative for ear pain, sore throat, runny nose   Eyes:  As in HPI  Cardiovascular:   denies any  palpitations at this time  Respiratory:  Negative for cough, sputum production,   Gastrointestinal: Negative for abdominal pain, nausea, vomiting, , or blood in stools . Positive as in hpi- constipation better   Genitourinary: Negative for dysuria, flank pain and hematuria. Positive for nocturia  Extremities: Negative for leg swelling   Neurologic: Negative for headaches, , seizures, weakness . Positive for blurry vision  as in hpi  Endo/Heme/Allergies: Positive for polydipsia, nocturia.  Denies any polyphagia    /73 (BP Location: Left arm, Patient Position: Sitting, BP Cuff Size: Adult)   Pulse 75   Temp 36.4 °C (97.6 °F) (Temporal)   Ht 1.607 m (5' 3.25\")   Wt 111 kg (244 lb)   LMP 03/30/2011   SpO2 " 94%   BMI 42.88 kg/m²     Physical Exam   Constitutional:  Well developed, well nourished. Not in acute distress   Eyes:  EOMI, Conjunctiva normal, No discharge.   Cardiovascular:  Regular rate and rhythm, No pedal edema, Intact distal pulses   Respiratory: Clear to auscultation bilaterally, No use of accessory muscles    Neurologic: Alert & oriented x 4, no cerebellar signs, normal romberg    Note: I have reviewed all pertinent labs and diagnostic tests associated with this visit with specific comments listed under the assessment and plan below    Assessment and Plan    1. Type 2 diabetes mellitus with nephropathy (HCC)  Continue metformin 2000 mg per day, patient will have to wait for new refill . Instructed to titrate up insulin until then 2 units every 2 days to keep sugars between 100 - 140   -Ozempic and victoza denies , jardiance prescription placed last time- patient has not heard about it yet   Educated regarding hypoglycemia   -Prescription for glucose tabs placed last time  -continue atorvastatin   Educated regarding checking sugars in morning and keeping a log which is to be brought to clinic as well as on hypoglycemia.   -lisinopril 30 mg to be continued   -Patient is yet to call nutritionist to set up appointment  -Microalbumin creatinine ratio next in september 2024  -Monofilament test next due in August 2024.  -Patient provided information for ophthalmologist and ER precautions for vision changes , worsening dizziness and/headaches   -Patient denies any  tingling or numbness.    - Insulin Glargine Solostar 100 UNIT/ML Solution Pen-injector; Inject 20 Units under the skin every evening.  Dispense: 18 mL; Refill: 1    2. Dental infection  Encouraged to follow up with dentist - explained risks- patient understands  - amoxicillin-clavulanate (AUGMENTIN) 875-125 MG Tab; Take 1 Tablet by mouth 2 times a day for 5 days.  Dispense: 10 Tablet; Refill: 0    3. Primary hypertension  Well controlled at this  time on lisinopril 30 mg     4. Blurry vision, bilateral  Provided ophthalmology referral given history of diabetes and CVA   Er precaution provided     5. Dizziness  Encouraged to check blood sugar and blood pressure the regla of these incidences and keep a log   Educated on hypoglycemia   Encouraged  hydration   At this time no concerning neuro exam to warrant head imaging     6. Dyslipidemia  -Continue atorvastatin     7. History of CVA (cerebrovascular accident)  Continue atorva , aspirin   Control of diabetes and blood pressure as above   Patient currently not using substances     8. Cerebrovascular accident (CVA), unspecified mechanism (HCC)  As above  - aspirin (ASA) 81 MG Chew Tab chewable tablet; Chew 1 Tablet every day.  Dispense: 100 Tablet; Refill: 1    9. Hypothyroidism, unspecified type  Last TSH of 1.17 from sept 2023 - will repeat with next set of lasb   - levothyroxine (SYNTHROID) 150 MCG Tab; Take 1 Tablet by mouth every morning on an empty stomach.  Dispense: 90 Tablet; Refill: 1         Followup: Return in about 2 weeks (around 11/17/2023).        Signed by: Kris White M.D.

## 2023-11-04 PROBLEM — Z86.73 HISTORY OF CVA (CEREBROVASCULAR ACCIDENT): Status: ACTIVE | Noted: 2022-04-27

## 2023-11-04 RX ORDER — LEVOTHYROXINE SODIUM 0.15 MG/1
150 TABLET ORAL
Qty: 90 TABLET | Refills: 1 | Status: SHIPPED | OUTPATIENT
Start: 2023-11-04

## 2023-11-04 RX ORDER — INSULIN GLARGINE 100 [IU]/ML
20 INJECTION, SOLUTION SUBCUTANEOUS EVERY EVENING
Qty: 18 ML | Refills: 1 | Status: SHIPPED | OUTPATIENT
Start: 2023-11-04 | End: 2024-01-10 | Stop reason: SDUPTHER

## 2023-11-04 RX ORDER — AMOXICILLIN AND CLAVULANATE POTASSIUM 875; 125 MG/1; MG/1
1 TABLET, FILM COATED ORAL 2 TIMES DAILY
Qty: 10 TABLET | Refills: 0 | Status: SHIPPED | OUTPATIENT
Start: 2023-11-04 | End: 2023-11-09

## 2023-11-04 RX ORDER — ASPIRIN 81 MG/1
81 TABLET, CHEWABLE ORAL DAILY
Qty: 100 TABLET | Refills: 1 | Status: SHIPPED | OUTPATIENT
Start: 2023-11-04

## 2023-11-06 ENCOUNTER — TELEPHONE (OUTPATIENT)
Dept: INTERNAL MEDICINE | Facility: OTHER | Age: 53
End: 2023-11-06
Payer: COMMERCIAL

## 2023-11-06 NOTE — TELEPHONE ENCOUNTER
Patient has picked up insulin   Jardiance for 30 days is 700 dollars.   Metfomrin cannot be filled till 16th   Called patient to let her know however no answer - will communicate during upcoming appointment.

## 2023-11-13 ENCOUNTER — TELEPHONE (OUTPATIENT)
Dept: INTERNAL MEDICINE | Facility: OTHER | Age: 53
End: 2023-11-13
Payer: COMMERCIAL

## 2023-11-13 NOTE — TELEPHONE ENCOUNTER
Patient reports that she will be getting metformin prescription for only 30 days and not 90 days even though prescription is for 90 days. Called pharmacy - they say it may be an insurance issue and they would not be able to tell it for sure until she is able to refill it on 16th - when they can run it through the insurance.   Patient can find out more about this if she is able to call her insurance company.   Called patient to let her know - however no answer.     Metformin 1000 XR is not covered.   Looking back she has always been on Xr even before I started seeing her - unclear if this was due to side effect from glucophage

## 2023-11-17 ENCOUNTER — OFFICE VISIT (OUTPATIENT)
Dept: INTERNAL MEDICINE | Facility: OTHER | Age: 53
End: 2023-11-17
Payer: COMMERCIAL

## 2023-11-17 VITALS
HEIGHT: 63 IN | BODY MASS INDEX: 44.01 KG/M2 | TEMPERATURE: 96.7 F | HEART RATE: 68 BPM | SYSTOLIC BLOOD PRESSURE: 123 MMHG | DIASTOLIC BLOOD PRESSURE: 86 MMHG | WEIGHT: 248.4 LBS | OXYGEN SATURATION: 91 %

## 2023-11-17 DIAGNOSIS — E11.21 TYPE 2 DIABETES MELLITUS WITH NEPHROPATHY (HCC): ICD-10-CM

## 2023-11-17 DIAGNOSIS — I10 PRIMARY HYPERTENSION: ICD-10-CM

## 2023-11-17 DIAGNOSIS — R42 DIZZINESS: ICD-10-CM

## 2023-11-17 PROCEDURE — 3074F SYST BP LT 130 MM HG: CPT | Performed by: INTERNAL MEDICINE

## 2023-11-17 PROCEDURE — 99214 OFFICE O/P EST MOD 30 MIN: CPT | Performed by: INTERNAL MEDICINE

## 2023-11-17 PROCEDURE — 3079F DIAST BP 80-89 MM HG: CPT | Performed by: INTERNAL MEDICINE

## 2023-11-17 NOTE — PROGRESS NOTES
Chief Complaint   Patient presents with    Follow-Up       HPI: Isa Espinoza is a 52 y.o. female with past medical history as below who presented to the clinic  for the following     *Type DM   - Aic of 6.7 in jUly 2022 , was on metformin 500 SR, > march, 2023 was  8> September 2023> 14   -Started on insulin 8 units, taking metformin 2000 mg regularly, patient is currently taking 26 units of insulin glargine at nighttime, blood sugars ranging from 150-200  She had been taking a few extra tablets of metformin - had ran out of it , currently is waiting for new prescription - discussed regarding why she was on ER - patient does not recall having diarrhea with Glucophage hence will transition to glucophage     - positive for nocturia, however has decreased in frequency compared to before, waking up 2-3 times compared to 5 times - urinating mostly in mornings   -not excessively hungry and not that thirsty as per patient   -Patient regularly sees optometrist, however with new onset blurry vision as described previously right worse than left - no flashes floaters- requested for follow up with eye doctor- this symptoms is better now as per patient - she was able to read with her current glasses the other day when she tried to sign something    -denies Any neuropathy, monofilament test in clinic  last visit  was negative.  -albumin creatinine ratio showing proteinuria= 85, renal function 15/1.03 with egfr 63 of , with UA showing ketones, protein glucose, high specific gravity, squamous epithelial cell, hyaline cast and few bacteria with no burning urgency, fevers chills, flank pain at this time     -Denies any constipation. .  --has appointment with dietician soon            *Blurry vision.  Better now       *Dizziness   -happens randomly even when sitting - for few seconds - patient has to distract herself - shake it off, take a walk and it goes away . Happened 2-3 times2 weeks ago , once last week . Feels like room  spinning   *Does have right sided headache, however unrelated, does have recent vision changes however not present simultaneous with the dizziness.  Reports that her blood pressures and sugars have been normal during these episodes, however patient is unable to tell me the numbers.   Encouraged to check them and write them down   Neuro exam including cerebellar, romberg were all normal   Has not been drinking enough water   Ear exam - wax on right ear - feels clicking on that side - instructed to use debrox     Cerumen in ear   -Plan as above    *Hypertension -  Has history of CVA.    Well managed with lisinopril 30 mg   One reading out of three readings patient has taken in 150/100- instructed to check more often to get more readings and compare average             Other problems not discussed on this visit  #DLD   -ldl of 69 as per most recent labs in September 2023- at goal on atorvastatin 40 and lifestyle changes   , as per labs done on 03/13/2023  MRI does show chronic microvascular ischemic changes      *Feeling full in throat   -resolved - back on omeprazole.      #Hot flashes   -Not bothering her too much   She sleeps with her window open and carries around a fan everywhere.     #Erythrocytosis   -oxygen saturation of 97%.   -Unclear if patient has sleep apnea. Unsure if she snores.   STOP BANG without that is intermediate, neck circumference not measured. Patient has not had a chance to ask son regarding snoring       *Preventative health  -Patient reports that she has received her shingles vaccine  -She reports that she has an appointment for colonoscopy, still awaiting a callback from them to schedule an appointment she will be calling them since she has not heard back from them for 3 weeks.    #Metabolic acidosis  resolved    #Transaminitis   Resolved  *Substance use disorder   -did have intermittent relapses however has been abstinent from meth since last visit and 1 month prior to that atleast as paer  patient   -Dental procedure has not been scheduled yet- appointment in december  -Currently smoking marijuana every other day  -States that she smokes a few cigarettes a month  -Social alcohol use.   -Patient had not presented to appointments for a few months after which she presents today.    *Hypothyroidism   -TSH of 26 in 2015, restarted on levothyroxine 150 mcg. With intermittent non adherence, most recent TSH wnl from September 2023    Weight has been up and down, currently down trending . States that her menstrual cycles are intermittently irregular     *History of CVA with substance use at the time   -TIA like symptoms happened  - 2 years ago-in 2021?- when she had slurry speech, vision changes on one eye, was emotional, seen at [facility] with resolution of symptoms in a few hours with recommendation to follow up with [facility] which she did not do at the time due to resolution of symptoms and then forgot about it. Patient was smoking cannabis and also methamphetamine at the time. Patient recently had an eye exam done at [facility] and was told that she has vision changes suggestive of a stroke for which an MRI brain was performed in August 2022 which confirmed the presence of a  old left temporal occipital stroke with chronic microvascular ischemic changes.     Patient currently states that she has worse vision on left eye, however doing well with glasses both for near and far sightedness ( 2 separate glasses) and still drives. No new vision changes.  Echo cardiogram in April 2021 showed trace mR, AI and Trace TR         *CKD stage 3a> CKD stage 2  As per most recent labs.   albumin creatinine ratio showing proteinuria= 85, renal function 15/1.03 with egfr 63 of , with UA showing ketones, protein glucose, high specific gravity, squamous epithelial cell, hyaline cast and few bacteria with no burning urgency, fevers chills, flank pain at this time                Family History   Problem Relation Age  of Onset    Stroke Mother         Patient states taht mother was way older than when she or her sister had stroked    Cancer Mother         States that it was somewhere near stomach which had already spread and she went to hospice upon diagnosis    Stroke Father     Stroke Sister 45        Paralysed neck below    No Known Problems Paternal Aunt       Social History     Socioeconomic History    Marital status:    Tobacco Use    Smoking status: Some Days     Current packs/day: 0.25     Average packs/day: 0.3 packs/day for 29.0 years (7.3 ttl pk-yrs)     Types: Cigarettes    Smokeless tobacco: Former    Tobacco comments:     tried it chewing tobacco but never continued use   Vaping Use    Vaping Use: Never used   Substance and Sexual Activity    Alcohol use: Yes     Comment: on special occasions    Drug use: Yes     Frequency: 7.0 times per week     Types: Marijuana, Methamphetamines     Comment: Marijuana daily, methamphetamine use once 3-4 weeks          Patient Active Problem List    Diagnosis Date Noted    Dental infection 10/19/2023    GERD (gastroesophageal reflux disease) 10/06/2023    Erythrocytosis 03/19/2023    Dyslipidemia 03/19/2023    Hot flashes 03/19/2023    CKD (chronic kidney disease) stage 2, GFR 60-89 ml/min 02/19/2023    History of CVA (cerebrovascular accident) 04/27/2022    Blurry vision, bilateral 04/19/2021    Substance abuse in remission (HCC) 04/19/2021    Hypothyroidism 08/11/2020    Type 2 diabetes mellitus with nephropathy (HCC) 08/11/2020    Dizziness 08/02/2018    Obesity with body mass index 30 or greater 07/13/2017    HTN (hypertension) 04/17/2015       Current Outpatient Medications   Medication Sig Dispense Refill    metformin (GLUCOPHAGE) 1000 MG tablet Take 1 Tablet by mouth 2 times a day with meals. 60 Tablet 1    aspirin (ASA) 81 MG Chew Tab chewable tablet Chew 1 Tablet every day. 100 Tablet 1    Insulin Glargine Solostar 100 UNIT/ML Solution Pen-injector Inject 20 Units  under the skin every evening. 18 mL 1    levothyroxine (SYNTHROID) 150 MCG Tab Take 1 Tablet by mouth every morning on an empty stomach. 90 Tablet 1    liraglutide (VICTOZA) 18 MG/3ML Solution Pen-injector Inject 0.6 mg under the skin every day for 7 days, THEN 1.2 mg every day for 81 days. 6 mL 1    Blood Glucose Test Strips Test strips order: Test strips for One Touch Verio meter. Sig: use once and prn ssx high or low sugar #100 RF x 3 100 Strip 3    ibuprofen (MOTRIN) 600 MG Tab Take 1 Tablet by mouth every 6 hours as needed for Moderate Pain. 30 Tablet 0    Lancets Use one True Metrix lancet to test blood sugar once daily . 100 Each 1    Alcohol Swabs Wipe site with prep pad prior to injection. 100 Each 0    omeprazole (PRILOSEC) 20 MG delayed-release capsule Take 1 Capsule by mouth every day. 30 Capsule 1    atorvastatin (LIPITOR) 40 MG Tab TAKE 1 TABLET BY MOUTH EVERY EVENING 90 Tablet 1    Empagliflozin (JARDIANCE) 10 MG Tab tablet Take 1 Tablet by mouth every day. (Patient not taking: Reported on 11/3/2023) 90 Tablet 1    Nutritional Supplements (GLUCOSE MANAGEMENT) Tab Take 4 Tablets by mouth as needed (for hypoglycemia). (Patient not taking: Reported on 11/3/2023) 15 Tablet 1    lisinopril (PRINIVIL) 30 MG tablet Take 1 Tablet by mouth every day. 90 Tablet 1    Blood Glucose Meter Kit Test blood sugar as recommended by provider. True Metrix blood glucose monitoring kit. (Patient not taking: Reported on 11/3/2023) 1 Kit 0     No current facility-administered medications for this visit.       ROS: As per HPI. Additional pertinent systems as noted below.  Constitutional:  Negative for fever, chills   HENT:  Negative for ear pain, sore throat, runny nose   Eyes:  As in HPI  Cardiovascular:   denies any  palpitations at this time  Respiratory:  Negative for cough, sputum production,   Gastrointestinal: Negative for abdominal pain, nausea, vomiting, , or blood in stools . Positive as in hpi- constipation better  "  Genitourinary: Negative for dysuria, flank pain and hematuria. Positive for nocturia  Extremities: Negative for leg swelling   Neurologic: Negative for headaches, , seizures, weakness . Positive for blurry vision  as in hpi  Endo/Heme/Allergies: Positive for  nocturia.  Denies any polyphagia    /86 (BP Location: Right arm, Patient Position: Sitting, BP Cuff Size: Adult)   Pulse 68   Temp 35.9 °C (96.7 °F) (Temporal)   Ht 1.607 m (5' 3.25\")   Wt 113 kg (248 lb 6.4 oz)   LMP 03/30/2011   SpO2 91%   BMI 43.65 kg/m²     Physical Exam   Constitutional:  Well developed, well nourished. Not in acute distress   Eyes:  EOMI, Conjunctiva normal, No discharge.   HEENT: cerumen on right ear - small chunk  Cardiovascular:  Regular rate and rhythm, No pedal edema, Intact distal pulses   Respiratory: Clear to auscultation bilaterally, No use of accessory muscles    Neurologic: Alert & oriented x 4, no cerebellar signs, normal romberg    Note: I have reviewed all pertinent labs and diagnostic tests associated with this visit with specific comments listed under the assessment and plan below    Assessment and Plan    1. Type 2 diabetes mellitus with nephropathy (HCC)  Increasing glargine by 2 more units - 28   Prescribing short acting metformin - 2000mg instead of ER    -Ozempic and victoza denies , jardiance prescription placed last time- patient has not heard about it yet   Educated regarding hypoglycemia   -Prescription for glucose tabs placed last time  -continue atorvastatin   Educated regarding checking sugars in morning and keeping a log which is to be brought to clinic as well as on hypoglycemia.   -lisinopril 30 mg to be continued   -Patient is yet to see  nutritionist - has appointment  -Microalbumin creatinine ratio next in september 2024  -Monofilament test next due in August 2024.  -Patient provided information for ophthalmologist and ER precautions for vision changes , worsening dizziness and/headaches "   -Patient denies any  tingling or numbness.    - metformin (GLUCOPHAGE) 1000 MG tablet; Take 1 Tablet by mouth 2 times a day with meals.  Dispense: 60 Tablet; Refill: 1      5. Dizziness  Encouraged to check blood sugar and blood pressure the regla of these incidences and keep a log   Educated on hypoglycemia   Encouraged  hydration   At this time no concerning neuro exam to warrant head imaging   ?BPPV - liliam hallpike not done - will tramainehart epley to see if that makes it better   Instructed to use debrox to get ear wax out if not will try to do ear lavage on next visit     3. Primary hypertension  Encouraged to check more often at home due to one out of three readings being high    Continue lisinopril 30 mg       Followup: Return in about 2 weeks (around 12/1/2023).        Signed by: Kris White M.D.

## 2023-11-17 NOTE — PROGRESS NOTES
No chief complaint on file.      HPI: Isa Espinoza is a 52 y.o. female with past medical history as below who presented to the clinic  for the following     *Type DM   - Aic of 6.7 in jUly 2022 , was on metformin 500 SR, > march, 2023 was  8> September 2023> 14   -Started on insulin 8 units, taking metformin 2000 mg regularly, patient is currently taking 16 units of insulin glargine at nighttime, blood sugars ranging from 131 -231  She had been taking a few extra tablets of metformin - has ran out of it and noticed that the sugars were trending up   - positive for nocturia, however has decreased in frequency compared to before,   -Patient regularly sees optometrist, however with new onset blurry vision as described previously right worse than left - no flashes floaters- requested for follow up with eye doctor- recommended to control sugars better before changing glass prescription   Patient does have new referral for ophthalmology - provided information   -denies Any neuropathy, monofilament test in clinic  last visit  was negative.  -albumin creatinine ratio showing proteinuria= 85, renal function 15/1.03 with egfr 63 of , with UA showing ketones, protein glucose, high specific gravity, squamous epithelial cell, hyaline cast and few bacteria with no burning urgency, fevers chills, flank pain at this time     -Denies any constipation. . Unable to eat a lot due to dental issues    Requesting for antibiotic course for dental infection - unable to see dentist until December   --Patient would also like to see a dietician, referral placed on the last visit.  Patient has not contacted them yet- referral processed - will be following up with them soon         *Blurry vision.  -Since last 1 and half months , patient had seen eye doctor in August, received new prescription for reading glasses however with reading glasses the blurriness is slightly better, has however significantly got worse suddently  Not associated  any eye pain, redness and is bilateral.  Denies any floaters flashes, .  Does mention some headaches lately that is nonspecific.  Does have dry eyes, is not using any lubricant at this time.  Positive for  dizziness is intermittent-separate from vision changes    -Patient regularly sees optometrist, however with new onset blurry vision as described previously right worse than left - no flashes floaters- requested for follow up with eye doctor- recommended to control sugars better before changing glass prescription   Patient does have new referral for ophthalmology - provided information   -provided ER precautions.      *Dizziness   -happens randomly even when sitting - for few seconds - patient has to distract herself - shake it off, take a walk and it goes away . Happened 2-3 times2 weeks ago , once last week . Feels like room spinning   *Does have right sided headache, however unrelated, does have recent vision changes however not present simultaneous with the dizziness.  Reports that her blood pressures and sugars have been normal during these episodes, however patient is unable to tell me the numbers.   Encouraged to check them and write them down   Neuro exam including cerebellar, romberg were all normal   Has not been drinking enough water     *Hypertension -  Has history of CVA.    Well managed with lisinopril 30 mg     *Recent ER visit for dental infection.  -Was currently on a 14-day course of Augmentin, will call dentist as soon as possible- has appointment for December   Requesting for antibiotic course to be repeated - patient understands risks and importance to follow up with dentist     #DLD   -ldl of 69 as per most recent labs in September 2023- at goal on atorvastatin 40 and lifestyle changes   , as per labs done on 03/13/2023  MRI does show chronic microvascular ischemic changes            Other problems not discussed on this visit  *Feeling full in throat   -reports that she feels full in throat,  no difficulty swallowing, no hoarseness, no choking. Does have reflux and heart burn. Was on omeprazole, not anymore.   -TSH on last labs done in September 2023 wnl.   -On examination - cannot appreciate LN or thyromegaly. Patient does have enlarged tonsils- denies any sore throat.      #Hot flashes   -Not bothering her too much   She sleeps with her window open and carries around a fan everywhere.     #Erythrocytosis   -oxygen saturation of 97%.   -Unclear if patient has sleep apnea. Unsure if she snores.   STOP BANG without that is intermediate, neck circumference not measured. Patient has not had a chance to ask son regarding snoring       *Preventative health  -Patient reports that she has received her shingles vaccine  -She reports that she has an appointment for colonoscopy, still awaiting a callback from them to schedule an appointment she will be calling them since she has not heard back from them for 3 weeks.    #Metabolic acidosis  resolved    #Transaminitis   Resolved  *Substance use disorder   -did have intermittent relapses however has been abstinent from meth since last visit and 1 month prior to that atleast as paer patient   -Dental procedure has not been scheduled yet- appointment in december  -Currently smoking marijuana every other day  -States that she smokes a few cigarettes a month  -Social alcohol use.   -Patient had not presented to appointments for a few months after which she presents today.    *Hypothyroidism   -TSH of 26 in 2015, restarted on levothyroxine 150 mcg. With intermittent non adherence, most recent TSH wnl from September 2023    Weight has been up and down, currently down trending . States that her menstrual cycles are intermittently irregular     *History of CVA with substance use at the time   -TIA like symptoms happened  - 2 years ago-in 2021?- when she had slurry speech, vision changes on one eye, was emotional, seen at [facility] with resolution of symptoms in a few  hours with recommendation to follow up with [facility] clinic which she did not do at the time due to resolution of symptoms and then forgot about it. Patient was smoking cannabis and also methamphetamine at the time. Patient recently had an eye exam done at [facility] and was told that she has vision changes suggestive of a stroke for which an MRI brain was performed in August 2022 which confirmed the presence of a  old left temporal occipital stroke with chronic microvascular ischemic changes.     Patient currently states that she has worse vision on left eye, however doing well with glasses both for near and far sightedness ( 2 separate glasses) and still drives. No new vision changes.  Echo cardiogram in April 2021 showed trace mR, AI and Trace TR         *CKD stage 3a> CKD stage 2  As per most recent labs.   albumin creatinine ratio showing proteinuria= 85, renal function 15/1.03 with egfr 63 of , with UA showing ketones, protein glucose, high specific gravity, squamous epithelial cell, hyaline cast and few bacteria with no burning urgency, fevers chills, flank pain at this time              Family History   Problem Relation Age of Onset    Stroke Mother         Patient states taht mother was way older than when she or her sister had stroked    Cancer Mother         States that it was somewhere near stomach which had already spread and she went to hospice upon diagnosis    Stroke Father     Stroke Sister 45        Paralysed neck below    No Known Problems Paternal Aunt       Social History     Socioeconomic History    Marital status:    Tobacco Use    Smoking status: Some Days     Current packs/day: 0.25     Average packs/day: 0.3 packs/day for 29.0 years (7.3 ttl pk-yrs)     Types: Cigarettes    Smokeless tobacco: Former    Tobacco comments:     tried it chewing tobacco but never continued use   Vaping Use    Vaping Use: Never used   Substance and Sexual Activity    Alcohol use: Yes     Comment: on  special occasions    Drug use: Yes     Frequency: 7.0 times per week     Types: Marijuana, Methamphetamines     Comment: Marijuana daily, methamphetamine use once 3-4 weeks          Patient Active Problem List    Diagnosis Date Noted    Dental infection 10/19/2023    GERD (gastroesophageal reflux disease) 10/06/2023    Erythrocytosis 03/19/2023    Dyslipidemia 03/19/2023    Hot flashes 03/19/2023    CKD (chronic kidney disease) stage 2, GFR 60-89 ml/min 02/19/2023    History of CVA (cerebrovascular accident) 04/27/2022    Blurry vision, bilateral 04/19/2021    Substance abuse in remission (HCC) 04/19/2021    Hypothyroidism 08/11/2020    Type 2 diabetes mellitus with nephropathy (HCC) 08/11/2020    Dizziness 08/02/2018    Obesity with body mass index 30 or greater 07/13/2017    HTN (hypertension) 04/17/2015    Non-adherence to medical treatment 04/17/2015       Current Outpatient Medications   Medication Sig Dispense Refill    aspirin (ASA) 81 MG Chew Tab chewable tablet Chew 1 Tablet every day. 100 Tablet 1    Insulin Glargine Solostar 100 UNIT/ML Solution Pen-injector Inject 20 Units under the skin every evening. 18 mL 1    levothyroxine (SYNTHROID) 150 MCG Tab Take 1 Tablet by mouth every morning on an empty stomach. 90 Tablet 1    metFORMIN ER (GLUCOPHAGE XR) 500 MG TABLET SR 24 HR Take 2 Tablets by mouth 2 times a day. 360 Tablet 3    Empagliflozin (JARDIANCE) 10 MG Tab tablet Take 1 Tablet by mouth every day. (Patient not taking: Reported on 11/3/2023) 90 Tablet 1    Nutritional Supplements (GLUCOSE MANAGEMENT) Tab Take 4 Tablets by mouth as needed (for hypoglycemia). (Patient not taking: Reported on 11/3/2023) 15 Tablet 1    lisinopril (PRINIVIL) 30 MG tablet Take 1 Tablet by mouth every day. 90 Tablet 1    liraglutide (VICTOZA) 18 MG/3ML Solution Pen-injector Inject 0.6 mg under the skin every day for 7 days, THEN 1.2 mg every day for 81 days. 6 mL 1    Blood Glucose Test Strips Test strips order: Test strips  for One Touch Verio meter. Sig: use once and prn ssx high or low sugar #100 RF x 3 100 Strip 3    ibuprofen (MOTRIN) 600 MG Tab Take 1 Tablet by mouth every 6 hours as needed for Moderate Pain. (Patient not taking: Reported on 11/3/2023) 30 Tablet 0    Lancets Use one True Metrix lancet to test blood sugar once daily . 100 Each 1    Alcohol Swabs Wipe site with prep pad prior to injection. 100 Each 0    omeprazole (PRILOSEC) 20 MG delayed-release capsule Take 1 Capsule by mouth every day. (Patient not taking: Reported on 10/18/2023) 30 Capsule 1    atorvastatin (LIPITOR) 40 MG Tab TAKE 1 TABLET BY MOUTH EVERY EVENING 90 Tablet 1    Blood Glucose Meter Kit Test blood sugar as recommended by provider. True Metrix blood glucose monitoring kit. (Patient not taking: Reported on 11/3/2023) 1 Kit 0     No current facility-administered medications for this visit.       ROS: As per HPI. Additional pertinent systems as noted below.  Constitutional:  Negative for fever, chills   HENT:  Negative for ear pain, sore throat, runny nose   Eyes:  As in HPI  Cardiovascular:   denies any  palpitations at this time  Respiratory:  Negative for cough, sputum production,   Gastrointestinal: Negative for abdominal pain, nausea, vomiting, , or blood in stools . Positive as in hpi- constipation better   Genitourinary: Negative for dysuria, flank pain and hematuria. Positive for nocturia  Extremities: Negative for leg swelling   Neurologic: Negative for headaches, , seizures, weakness . Positive for blurry vision  as in hpi  Endo/Heme/Allergies: Positive for polydipsia, nocturia.  Denies any polyphagia    LMP 03/30/2011     Physical Exam   Constitutional:  Well developed, well nourished. Not in acute distress   Eyes:  EOMI, Conjunctiva normal, No discharge.   Cardiovascular:  Regular rate and rhythm, No pedal edema, Intact distal pulses   Respiratory: Clear to auscultation bilaterally, No use of accessory muscles    Neurologic: Alert &  oriented x 4, no cerebellar signs, normal romberg    Note: I have reviewed all pertinent labs and diagnostic tests associated with this visit with specific comments listed under the assessment and plan below    Assessment and Plan    1. Type 2 diabetes mellitus with nephropathy (HCC)  Continue metformin 2000 mg per day, patient will have to wait for new refill . Instructed to titrate up insulin until then 2 units every 2 days to keep sugars between 100 - 140   -Ozempic and victoza denies , jardiance prescription placed last time- patient has not heard about it yet   Educated regarding hypoglycemia   -Prescription for glucose tabs placed last time  -continue atorvastatin   Educated regarding checking sugars in morning and keeping a log which is to be brought to clinic as well as on hypoglycemia.   -lisinopril 30 mg to be continued   -Patient is yet to call nutritionist to set up appointment  -Microalbumin creatinine ratio next in september 2024  -Monofilament test next due in August 2024.  -Patient provided information for ophthalmologist and ER precautions for vision changes , worsening dizziness and/headaches   -Patient denies any  tingling or numbness.    - Insulin Glargine Solostar 100 UNIT/ML Solution Pen-injector; Inject 20 Units under the skin every evening.  Dispense: 18 mL; Refill: 1    2. Dental infection  Encouraged to follow up with dentist - explained risks- patient understands  - amoxicillin-clavulanate (AUGMENTIN) 875-125 MG Tab; Take 1 Tablet by mouth 2 times a day for 5 days.  Dispense: 10 Tablet; Refill: 0    3. Primary hypertension  Well controlled at this time on lisinopril 30 mg     4. Blurry vision, bilateral  Provided ophthalmology referral given history of diabetes and CVA   Er precaution provided     5. Dizziness  Encouraged to check blood sugar and blood pressure the regla of these incidences and keep a log   Educated on hypoglycemia   Encouraged  hydration   At this time no concerning neuro  exam to warrant head imaging     6. Dyslipidemia  -Continue atorvastatin     7. History of CVA (cerebrovascular accident)  Continue atorva , aspirin   Control of diabetes and blood pressure as above   Patient currently not using substances     8. Cerebrovascular accident (CVA), unspecified mechanism (HCC)  As above  - aspirin (ASA) 81 MG Chew Tab chewable tablet; Chew 1 Tablet every day.  Dispense: 100 Tablet; Refill: 1    9. Hypothyroidism, unspecified type  Last TSH of 1.17 from sept 2023 - will repeat with next set of lasb   - levothyroxine (SYNTHROID) 150 MCG Tab; Take 1 Tablet by mouth every morning on an empty stomach.  Dispense: 90 Tablet; Refill: 1         Followup: No follow-ups on file.        Signed by: Kris White M.D.

## 2023-12-11 DIAGNOSIS — R73.9 HYPERGLYCEMIA: ICD-10-CM

## 2023-12-15 ENCOUNTER — APPOINTMENT (OUTPATIENT)
Dept: INTERNAL MEDICINE | Facility: OTHER | Age: 53
End: 2023-12-15
Payer: COMMERCIAL

## 2023-12-15 NOTE — PROGRESS NOTES
No chief complaint on file.      HPI: Isa Espinoza is a 52 y.o. female with past medical history as below who presented to the clinic  for the following     *Type DM   - Aic of 6.7 in jUly 2022 , was on metformin 500 SR, > march, 2023 was  8> September 2023> 14   -Started on insulin 8 units, taking metformin 2000 mg regularly, patient is currently taking 26 units of insulin glargine at nighttime, blood sugars ranging from 150-200  She had been taking a few extra tablets of metformin - had ran out of it , currently is waiting for new prescription - discussed regarding why she was on ER - patient does not recall having diarrhea with Glucophage hence will transition to glucophage     - positive for nocturia, however has decreased in frequency compared to before, waking up 2-3 times compared to 5 times - urinating mostly in mornings   -not excessively hungry and not that thirsty as per patient   -Patient regularly sees optometrist, however with new onset blurry vision as described previously right worse than left - no flashes floaters- requested for follow up with eye doctor- this symptoms is better now as per patient - she was able to read with her current glasses the other day when she tried to sign something    -denies Any neuropathy, monofilament test in clinic  last visit  was negative.  -albumin creatinine ratio showing proteinuria= 85, renal function 15/1.03 with egfr 63 of , with UA showing ketones, protein glucose, high specific gravity, squamous epithelial cell, hyaline cast and few bacteria with no burning urgency, fevers chills, flank pain at this time     -Denies any constipation. .  --has appointment with dietician soon            *Blurry vision.  Better now       *Dizziness   -happens randomly even when sitting - for few seconds - patient has to distract herself - shake it off, take a walk and it goes away . Happened 2-3 times2 weeks ago , once last week . Feels like room spinning   *Does have  right sided headache, however unrelated, does have recent vision changes however not present simultaneous with the dizziness.  Reports that her blood pressures and sugars have been normal during these episodes, however patient is unable to tell me the numbers.   Encouraged to check them and write them down   Neuro exam including cerebellar, romberg were all normal   Has not been drinking enough water   Ear exam - wax on right ear - feels clicking on that side - instructed to use debrox     Cerumen in ear   -Plan as above    *Hypertension -  Has history of CVA.    Well managed with lisinopril 30 mg   One reading out of three readings patient has taken in 150/100- instructed to check more often to get more readings and compare average             Other problems not discussed on this visit  #DLD   -ldl of 69 as per most recent labs in September 2023- at goal on atorvastatin 40 and lifestyle changes   , as per labs done on 03/13/2023  MRI does show chronic microvascular ischemic changes      *Feeling full in throat   -resolved - back on omeprazole.      #Hot flashes   -Not bothering her too much   She sleeps with her window open and carries around a fan everywhere.     #Erythrocytosis   -oxygen saturation of 97%.   -Unclear if patient has sleep apnea. Unsure if she snores.   STOP BANG without that is intermediate, neck circumference not measured. Patient has not had a chance to ask son regarding snoring       *Preventative health  -Patient reports that she has received her shingles vaccine  -She reports that she has an appointment for colonoscopy, still awaiting a callback from them to schedule an appointment she will be calling them since she has not heard back from them for 3 weeks.    #Metabolic acidosis  resolved    #Transaminitis   Resolved  *Substance use disorder   -did have intermittent relapses however has been abstinent from meth since last visit and 1 month prior to that atleast as paer patient   -Dental  procedure has not been scheduled yet- appointment in december  -Currently smoking marijuana every other day  -States that she smokes a few cigarettes a month  -Social alcohol use.   -Patient had not presented to appointments for a few months after which she presents today.    *Hypothyroidism   -TSH of 26 in 2015, restarted on levothyroxine 150 mcg. With intermittent non adherence, most recent TSH wnl from September 2023    Weight has been up and down, currently down trending . States that her menstrual cycles are intermittently irregular     *History of CVA with substance use at the time   -TIA like symptoms happened  - 2 years ago-in 2021?- when she had slurry speech, vision changes on one eye, was emotional, seen at [facility] with resolution of symptoms in a few hours with recommendation to follow up with [facility] which she did not do at the time due to resolution of symptoms and then forgot about it. Patient was smoking cannabis and also methamphetamine at the time. Patient recently had an eye exam done at [facility] and was told that she has vision changes suggestive of a stroke for which an MRI brain was performed in August 2022 which confirmed the presence of a  old left temporal occipital stroke with chronic microvascular ischemic changes.     Patient currently states that she has worse vision on left eye, however doing well with glasses both for near and far sightedness ( 2 separate glasses) and still drives. No new vision changes.  Echo cardiogram in April 2021 showed trace mR, AI and Trace TR         *CKD stage 3a> CKD stage 2  As per most recent labs.   albumin creatinine ratio showing proteinuria= 85, renal function 15/1.03 with egfr 63 of , with UA showing ketones, protein glucose, high specific gravity, squamous epithelial cell, hyaline cast and few bacteria with no burning urgency, fevers chills, flank pain at this time                Family History   Problem Relation Age of Onset    Stroke  Mother         Patient states taht mother was way older than when she or her sister had stroked    Cancer Mother         States that it was somewhere near stomach which had already spread and she went to hospice upon diagnosis    Stroke Father     Stroke Sister 45        Paralysed neck below    No Known Problems Paternal Aunt       Social History     Socioeconomic History    Marital status:    Tobacco Use    Smoking status: Some Days     Current packs/day: 0.25     Average packs/day: 0.3 packs/day for 29.0 years (7.3 ttl pk-yrs)     Types: Cigarettes    Smokeless tobacco: Former    Tobacco comments:     tried it chewing tobacco but never continued use   Vaping Use    Vaping Use: Never used   Substance and Sexual Activity    Alcohol use: Yes     Comment: on special occasions    Drug use: Yes     Frequency: 7.0 times per week     Types: Marijuana, Methamphetamines     Comment: Marijuana daily, methamphetamine use once 3-4 weeks          Patient Active Problem List    Diagnosis Date Noted    Dental infection 10/19/2023    GERD (gastroesophageal reflux disease) 10/06/2023    Erythrocytosis 03/19/2023    Dyslipidemia 03/19/2023    Hot flashes 03/19/2023    CKD (chronic kidney disease) stage 2, GFR 60-89 ml/min 02/19/2023    History of CVA (cerebrovascular accident) 04/27/2022    Blurry vision, bilateral 04/19/2021    Substance abuse in remission (HCC) 04/19/2021    Hypothyroidism 08/11/2020    Type 2 diabetes mellitus with nephropathy (HCC) 08/11/2020    Dizziness 08/02/2018    Obesity with body mass index 30 or greater 07/13/2017    HTN (hypertension) 04/17/2015       Current Outpatient Medications   Medication Sig Dispense Refill    Blood Glucose Test Strips Test strips order: Test strips for One Touch Verio meter. Sig: use once and prn ssx high or low sugar #100 RF x 3 100 Strip 3    metformin (GLUCOPHAGE) 1000 MG tablet Take 1 Tablet by mouth 2 times a day with meals. 60 Tablet 1    aspirin (ASA) 81 MG Chew  Tab chewable tablet Chew 1 Tablet every day. 100 Tablet 1    Insulin Glargine Solostar 100 UNIT/ML Solution Pen-injector Inject 20 Units under the skin every evening. 18 mL 1    levothyroxine (SYNTHROID) 150 MCG Tab Take 1 Tablet by mouth every morning on an empty stomach. 90 Tablet 1    Empagliflozin (JARDIANCE) 10 MG Tab tablet Take 1 Tablet by mouth every day. (Patient not taking: Reported on 11/3/2023) 90 Tablet 1    Nutritional Supplements (GLUCOSE MANAGEMENT) Tab Take 4 Tablets by mouth as needed (for hypoglycemia). (Patient not taking: Reported on 11/3/2023) 15 Tablet 1    lisinopril (PRINIVIL) 30 MG tablet Take 1 Tablet by mouth every day. 90 Tablet 1    liraglutide (VICTOZA) 18 MG/3ML Solution Pen-injector Inject 0.6 mg under the skin every day for 7 days, THEN 1.2 mg every day for 81 days. 6 mL 1    ibuprofen (MOTRIN) 600 MG Tab Take 1 Tablet by mouth every 6 hours as needed for Moderate Pain. 30 Tablet 0    Lancets Use one True Metrix lancet to test blood sugar once daily . 100 Each 1    Alcohol Swabs Wipe site with prep pad prior to injection. 100 Each 0    omeprazole (PRILOSEC) 20 MG delayed-release capsule Take 1 Capsule by mouth every day. 30 Capsule 1    atorvastatin (LIPITOR) 40 MG Tab TAKE 1 TABLET BY MOUTH EVERY EVENING 90 Tablet 1    Blood Glucose Meter Kit Test blood sugar as recommended by provider. True Metrix blood glucose monitoring kit. (Patient not taking: Reported on 11/3/2023) 1 Kit 0     No current facility-administered medications for this visit.       ROS: As per HPI. Additional pertinent systems as noted below.  Constitutional:  Negative for fever, chills   HENT:  Negative for ear pain, sore throat, runny nose   Eyes:  As in HPI  Cardiovascular:   denies any  palpitations at this time  Respiratory:  Negative for cough, sputum production,   Gastrointestinal: Negative for abdominal pain, nausea, vomiting, , or blood in stools . Positive as in hpi- constipation better   Genitourinary:  Negative for dysuria, flank pain and hematuria. Positive for nocturia  Extremities: Negative for leg swelling   Neurologic: Negative for headaches, , seizures, weakness . Positive for blurry vision  as in hpi  Endo/Heme/Allergies: Positive for  nocturia.  Denies any polyphagia    LMP 03/30/2011     Physical Exam   Constitutional:  Well developed, well nourished. Not in acute distress   Eyes:  EOMI, Conjunctiva normal, No discharge.   HEENT: cerumen on right ear - small chunk  Cardiovascular:  Regular rate and rhythm, No pedal edema, Intact distal pulses   Respiratory: Clear to auscultation bilaterally, No use of accessory muscles    Neurologic: Alert & oriented x 4, no cerebellar signs, normal romberg    Note: I have reviewed all pertinent labs and diagnostic tests associated with this visit with specific comments listed under the assessment and plan below    Assessment and Plan    1. Type 2 diabetes mellitus with nephropathy (HCC)  Increasing glargine by 2 more units - 28   Prescribing short acting metformin - 2000mg instead of ER    -Ozempic and victoza denies , jardiance prescription placed last time- patient has not heard about it yet   Educated regarding hypoglycemia   -Prescription for glucose tabs placed last time  -continue atorvastatin   Educated regarding checking sugars in morning and keeping a log which is to be brought to clinic as well as on hypoglycemia.   -lisinopril 30 mg to be continued   -Patient is yet to see nutritionist - has appointment  -Microalbumin creatinine ratio next in september 2024  -Monofilament test next due in August 2024.  -Patient provided information for ophthalmologist and ER precautions for vision changes , worsening dizziness and/headaches   -Patient denies any  tingling or numbness.    - metformin (GLUCOPHAGE) 1000 MG tablet; Take 1 Tablet by mouth 2 times a day with meals.  Dispense: 60 Tablet; Refill: 1      5. Dizziness  Encouraged to check blood sugar and blood pressure  the regla of these incidences and keep a log   Educated on hypoglycemia   Encouraged  hydration   At this time no concerning neuro exam to warrant head imaging   ?BPPV - liliam hallpike not done - will lopezt epley to see if that makes it better   Instructed to use debrox to get ear wax out if not will try to do ear lavage on next visit     3. Primary hypertension  Encouraged to check more often at home due to one out of three readings being high    Continue lisinopril 30 mg       Followup: No follow-ups on file.        Signed by: Kris White M.D.

## 2024-01-10 ENCOUNTER — OFFICE VISIT (OUTPATIENT)
Dept: INTERNAL MEDICINE | Facility: OTHER | Age: 54
End: 2024-01-10
Payer: COMMERCIAL

## 2024-01-10 VITALS
TEMPERATURE: 97.8 F | OXYGEN SATURATION: 96 % | HEART RATE: 66 BPM | HEIGHT: 63 IN | SYSTOLIC BLOOD PRESSURE: 136 MMHG | WEIGHT: 253 LBS | DIASTOLIC BLOOD PRESSURE: 79 MMHG | BODY MASS INDEX: 44.83 KG/M2

## 2024-01-10 DIAGNOSIS — E11.21 TYPE 2 DIABETES MELLITUS WITH NEPHROPATHY (HCC): ICD-10-CM

## 2024-01-10 DIAGNOSIS — I10 PRIMARY HYPERTENSION: ICD-10-CM

## 2024-01-10 LAB
HBA1C MFR BLD: 7.4 % (ref ?–5.8)
POCT INT CON NEG: NEGATIVE
POCT INT CON POS: POSITIVE

## 2024-01-10 PROCEDURE — 3078F DIAST BP <80 MM HG: CPT | Performed by: INTERNAL MEDICINE

## 2024-01-10 PROCEDURE — 3075F SYST BP GE 130 - 139MM HG: CPT | Performed by: INTERNAL MEDICINE

## 2024-01-10 PROCEDURE — 99214 OFFICE O/P EST MOD 30 MIN: CPT | Performed by: INTERNAL MEDICINE

## 2024-01-10 PROCEDURE — 83036 HEMOGLOBIN GLYCOSYLATED A1C: CPT | Performed by: INTERNAL MEDICINE

## 2024-01-10 RX ORDER — INSULIN GLARGINE 100 [IU]/ML
28 INJECTION, SOLUTION SUBCUTANEOUS EVERY EVENING
Qty: 30 ML | Refills: 1 | Status: SHIPPED | OUTPATIENT
Start: 2024-01-10 | End: 2024-03-22 | Stop reason: SDUPTHER

## 2024-01-10 RX ORDER — LISINOPRIL 30 MG/1
30 TABLET ORAL DAILY
Qty: 90 TABLET | Refills: 1 | Status: SHIPPED | OUTPATIENT
Start: 2024-01-10

## 2024-01-10 ASSESSMENT — PATIENT HEALTH QUESTIONNAIRE - PHQ9: CLINICAL INTERPRETATION OF PHQ2 SCORE: 0

## 2024-01-10 NOTE — PROGRESS NOTES
Chief Complaint   Patient presents with    Diabetes     Follow up    Medication Management     Would like to discuss all medications       HPI: Isa Espinoza is a 52 y.o. female with past medical history as below who presented to the clinic  for the following     Patient is undergoing colonoscopy on Thursday- stopped aspirin as per Gi recs. Wants recommendations on insulin .Metformin will be held the day of procedure as per Gi recs.       *Type DM   - Aic of 6.7 in jUly 2022 , was on metformin 500 SR, > march, 2023 was  8> September 2023> 14 > A1c today 7.4   - taking metformin 2000 mg regularly- tolerating glucopghage well, patient is currently taking 28 units of insulin glargine at nighttime, blood sugars ranging from , very few above 180 190 , majority under 150  Patient reports that the 221 was when she had had candy the evening before       - positive for nocturia, however has decreased in frequency compared to before, waking up 2-3 times compared to 5 times -  -not excessively hungry and not that thirsty as per patient - trying to cut down not yet called nutritionist as they had to cancel - patient will ask  regarding rescheduling    -Patient regularly sees optometrist, however with new onset blurry vision as described previously right worse than left - no flashes floaters- requested for follow up with eye doctor- this symptoms is better now as per patient -  -denies Any neuropathy, monofilament test in clinic  last visit  was negative.  -albumin creatinine ratio showing proteinuria= 85, renal function 15/1.03 with egfr 63 of , with UA showing ketones, protein glucose, high specific gravity, squamous epithelial cell, hyaline cast and few bacteria with no burning urgency, fevers chills, flank pain at this time     -Denies any constipation. .  Requesting for refill of pen needles and insulin         *Dizziness   -reports that she has had almost no episodes that were noticeable since last  visit   -happens randomly even when sitting - for few seconds - patient has to distract herself - shake it off, take a walk and it goes away . . Feels like room spinning   Reports that her blood pressures and sugars have been normal during these episodes, however patient is unable to tell me the numbers.   Encouraged to check them and write them down   Neuro exam including cerebellar, romberg were all normal   Has not been drinking enough water   Ear exam - wax on right ear - feels clicking on that side - instructed to use debrox       *Hypertension -  Has history of CVA.    Well managed with lisinopril 30 mg - requesting for refill   One reading out of three readings patient has taken in 150/100- instructed to check more often to get more readings and compare average             Other problems not discussed on this visit  Cerumen in ear   -Plan as above  #DLD   -ldl of 69 as per most recent labs in September 2023- at goal on atorvastatin 40 and lifestyle changes   , as per labs done on 03/13/2023  MRI does show chronic microvascular ischemic changes      *Feeling full in throat   -resolved - back on omeprazole.      #Hot flashes   -Not bothering her too much   She sleeps with her window open and carries around a fan everywhere.     #Erythrocytosis   -oxygen saturation of 97%.   -Unclear if patient has sleep apnea. Unsure if she snores.   STOP BANG without that is intermediate, neck circumference not measured. Patient has not had a chance to ask son regarding snoring       *Preventative health  -Patient reports that she has received her shingles vaccine  -She reports that she has an appointment for colonoscopy, still awaiting a callback from them to schedule an appointment she will be calling them since she has not heard back from them for 3 weeks.    #Metabolic acidosis  resolved    #Transaminitis   Resolved  *Substance use disorder   -did have intermittent relapses however has been abstinent from meth since last  visit and 1 month prior to that atleast as paer patient   -Dental procedure has not been scheduled yet- appointment in december  -Currently smoking marijuana every other day  -States that she smokes a few cigarettes a month  -Social alcohol use.   -Patient had not presented to appointments for a few months after which she presents today.    *Hypothyroidism   -TSH of 26 in 2015, restarted on levothyroxine 150 mcg. With intermittent non adherence, most recent TSH wnl from September 2023    Weight has been up and down, currently down trending . States that her menstrual cycles are intermittently irregular     *History of CVA with substance use at the time   -TIA like symptoms happened  - 2 years ago-in 2021?- when she had slurry speech, vision changes on one eye, was emotional, seen at [facility] with resolution of symptoms in a few hours with recommendation to follow up with hospitals clinic which she did not do at the time due to resolution of symptoms and then forgot about it. Patient was smoking cannabis and also methamphetamine at the time. Patient recently had an eye exam done at [facility] and was told that she has vision changes suggestive of a stroke for which an MRI brain was performed in August 2022 which confirmed the presence of a  old left temporal occipital stroke with chronic microvascular ischemic changes.     Patient currently states that she has worse vision on left eye, however doing well with glasses both for near and far sightedness ( 2 separate glasses) and still drives. No new vision changes.  Echo cardiogram in April 2021 showed trace mR, AI and Trace TR         *CKD stage 3a> CKD stage 2  As per most recent labs.   albumin creatinine ratio showing proteinuria= 85, renal function 15/1.03 with egfr 63 of , with UA showing ketones, protein glucose, high specific gravity, squamous epithelial cell, hyaline cast and few bacteria with no burning urgency, fevers chills, flank pain at this time                 Family History   Problem Relation Age of Onset    Stroke Mother         Patient states taht mother was way older than when she or her sister had stroked    Cancer Mother         States that it was somewhere near stomach which had already spread and she went to hospice upon diagnosis    Stroke Father     Stroke Sister 45        Paralysed neck below    No Known Problems Paternal Aunt       Social History     Socioeconomic History    Marital status:    Tobacco Use    Smoking status: Some Days     Current packs/day: 0.25     Average packs/day: 0.3 packs/day for 29.0 years (7.3 ttl pk-yrs)     Types: Cigarettes    Smokeless tobacco: Former    Tobacco comments:     tried it chewing tobacco but never continued use   Vaping Use    Vaping Use: Never used   Substance and Sexual Activity    Alcohol use: Yes     Comment: on special occasions    Drug use: Yes     Frequency: 7.0 times per week     Types: Marijuana, Methamphetamines     Comment: Marijuana daily, methamphetamine use once 3-4 weeks          Patient Active Problem List    Diagnosis Date Noted    Dental infection 10/19/2023    GERD (gastroesophageal reflux disease) 10/06/2023    Erythrocytosis 03/19/2023    Dyslipidemia 03/19/2023    Hot flashes 03/19/2023    CKD (chronic kidney disease) stage 2, GFR 60-89 ml/min 02/19/2023    History of CVA (cerebrovascular accident) 04/27/2022    Blurry vision, bilateral 04/19/2021    Substance abuse in remission (HCC) 04/19/2021    Hypothyroidism 08/11/2020    Type 2 diabetes mellitus with nephropathy (HCC) 08/11/2020    Dizziness 08/02/2018    Obesity with body mass index 30 or greater 07/13/2017    HTN (hypertension) 04/17/2015       Current Outpatient Medications   Medication Sig Dispense Refill    Insulin Glargine Solostar 100 UNIT/ML Solution Pen-injector Inject 28 Units under the skin every evening. 30 mL 1    Insulin Pen Needle 32 G x 4 mm Use one pen needle in pen device to inject insulin once daily.  100 Each 1    lisinopril (PRINIVIL) 30 MG tablet Take 1 Tablet by mouth every day. 90 Tablet 1    metformin (GLUCOPHAGE) 1000 MG tablet TAKE 1 TABLET BY MOUTH TWICE DAILY WITH MEALS 60 Tablet 1    Blood Glucose Test Strips Test strips order: Test strips for One Touch Verio meter. Sig: use once and prn ssx high or low sugar #100 RF x 3 100 Strip 3    aspirin (ASA) 81 MG Chew Tab chewable tablet Chew 1 Tablet every day. 100 Tablet 1    levothyroxine (SYNTHROID) 150 MCG Tab Take 1 Tablet by mouth every morning on an empty stomach. 90 Tablet 1    Lancets Use one True Metrix lancet to test blood sugar once daily . 100 Each 1    Alcohol Swabs Wipe site with prep pad prior to injection. 100 Each 0    omeprazole (PRILOSEC) 20 MG delayed-release capsule Take 1 Capsule by mouth every day. 30 Capsule 1    atorvastatin (LIPITOR) 40 MG Tab TAKE 1 TABLET BY MOUTH EVERY EVENING 90 Tablet 1    Nutritional Supplements (GLUCOSE MANAGEMENT) Tab Take 4 Tablets by mouth as needed (for hypoglycemia). (Patient not taking: Reported on 11/3/2023) 15 Tablet 1    Blood Glucose Meter Kit Test blood sugar as recommended by provider. True Metrix blood glucose monitoring kit. (Patient not taking: Reported on 11/3/2023) 1 Kit 0     No current facility-administered medications for this visit.       ROS: As per HPI. Additional pertinent systems as noted below.  Constitutional:  Negative for fever, chills   Cardiovascular:   denies any  palpitations at this time  Respiratory:  Negative for cough, sputum production,   Gastrointestinal: Negative for abdominal pain, nausea, vomiting, , or blood in stools . Positive as in hpi- constipation better   Genitourinary: Negative for dysuria, flank pain and hematuria. Positive for nocturia  Extremities: Negative for leg swelling   Neurologic: Negative for headaches, , seizures, weakness . Endo/Heme/Allergies: Positive for  nocturia.  Denies any polyphagia    /79 (BP Location: Left arm, Patient Position:  "Sitting, BP Cuff Size: Adult)   Pulse 66   Temp 36.6 °C (97.8 °F) (Temporal)   Ht 1.607 m (5' 3.25\")   Wt 115 kg (253 lb)   LMP 03/30/2011   SpO2 96%   BMI 44.46 kg/m²     Physical Exam   Constitutional:  Well developed, well nourished. Not in acute distress   Cardiovascular:  Regular rate and rhythm, No pedal edema, Intact distal pulses   Respiratory: Clear to auscultation bilaterally, No use of accessory muscles    Neurologic: Alert & oriented x 4, no cerebellar signs, normal romberg    Note: I have reviewed all pertinent labs and diagnostic tests associated with this visit with specific comments listed under the assessment and plan below    Assessment and Plan    1. Type 2 diabetes mellitus with nephropathy (HCC)  Evening before colonoscopy take around 70% dose - 20 units and resume 28 units the next day   Hold metformin the day of procedure   -A1C much improved   -Ozempic and victoza denies , jardiance prescription placed last time- patient has not heard about it yet   Educated regarding hypoglycemia   -Prescription for glucose tabs placed last time  -continue atorvastatin   Continue checking sugars   -lisinopril 30 mg to be continued   -Patient is yet to see  nutritionist - has appointment  -Microalbumin creatinine ratio next in september 2024  -Monofilament test next due in August 2024.  -Patient provided information for ophthalmologist and ER precautions for vision changes , worsening dizziness and/headaches   -Patient denies any  tingling or numbness.      2. Primary hypertension  Well controlled   Continue lisinopril 30 mg       Followup: Return in about 3 months (around 4/10/2024).        Signed by: Kris White M.D.  "

## 2024-01-19 ENCOUNTER — APPOINTMENT (OUTPATIENT)
Dept: INTERNAL MEDICINE | Facility: OTHER | Age: 54
End: 2024-01-19
Payer: COMMERCIAL

## 2024-01-30 ENCOUNTER — APPOINTMENT (OUTPATIENT)
Dept: INTERNAL MEDICINE | Facility: OTHER | Age: 54
End: 2024-01-30
Payer: COMMERCIAL

## 2024-02-14 ENCOUNTER — TELEPHONE (OUTPATIENT)
Dept: INTERNAL MEDICINE | Facility: OTHER | Age: 54
End: 2024-02-14
Payer: COMMERCIAL

## 2024-02-14 NOTE — TELEPHONE ENCOUNTER
Received recommendations to start SGLT2 from [facility]   Jardiance was already prescribed - but not covered - can attempt again when I see patient next time

## 2024-03-22 DIAGNOSIS — E11.21 TYPE 2 DIABETES MELLITUS WITH NEPHROPATHY (HCC): ICD-10-CM

## 2024-03-22 NOTE — TELEPHONE ENCOUNTER
Received request via: Patient    Was the patient seen in the last year in this department? Yes    Does the patient have an active prescription (recently filled or refills available) for medication(s) requested? No    [facility]     Does the patient have [facility] and need 100 day supply (blood pressure, diabetes and cholesterol meds only)? Patient does not have [facility]

## 2024-03-25 RX ORDER — INSULIN GLARGINE 100 [IU]/ML
28 INJECTION, SOLUTION SUBCUTANEOUS EVERY EVENING
Qty: 30 ML | Refills: 1 | OUTPATIENT
Start: 2024-03-25

## 2024-03-25 RX ORDER — INSULIN GLARGINE 100 [IU]/ML
28 INJECTION, SOLUTION SUBCUTANEOUS EVERY EVENING
Qty: 30 ML | Refills: 1 | Status: SHIPPED | OUTPATIENT
Start: 2024-03-25

## 2024-03-28 ENCOUNTER — APPOINTMENT (OUTPATIENT)
Dept: INTERNAL MEDICINE | Facility: OTHER | Age: 54
End: 2024-03-28
Payer: COMMERCIAL

## 2024-03-29 ENCOUNTER — OFFICE VISIT (OUTPATIENT)
Dept: INTERNAL MEDICINE | Facility: OTHER | Age: 54
End: 2024-03-29
Payer: COMMERCIAL

## 2024-03-29 VITALS
SYSTOLIC BLOOD PRESSURE: 145 MMHG | WEIGHT: 237.8 LBS | DIASTOLIC BLOOD PRESSURE: 82 MMHG | OXYGEN SATURATION: 97 % | BODY MASS INDEX: 42.13 KG/M2 | HEART RATE: 71 BPM | TEMPERATURE: 95.8 F | HEIGHT: 63 IN

## 2024-03-29 DIAGNOSIS — I10 PRIMARY HYPERTENSION: ICD-10-CM

## 2024-03-29 DIAGNOSIS — H61.21 IMPACTED CERUMEN OF RIGHT EAR: ICD-10-CM

## 2024-03-29 DIAGNOSIS — E03.9 HYPOTHYROIDISM, UNSPECIFIED TYPE: ICD-10-CM

## 2024-03-29 DIAGNOSIS — I63.9 CEREBROVASCULAR ACCIDENT (CVA), UNSPECIFIED MECHANISM (HCC): ICD-10-CM

## 2024-03-29 DIAGNOSIS — F19.90 SUBSTANCE USE DISORDER: ICD-10-CM

## 2024-03-29 DIAGNOSIS — K21.9 GASTROESOPHAGEAL REFLUX DISEASE WITHOUT ESOPHAGITIS: ICD-10-CM

## 2024-03-29 DIAGNOSIS — E11.21 TYPE 2 DIABETES MELLITUS WITH NEPHROPATHY (HCC): ICD-10-CM

## 2024-03-29 DIAGNOSIS — R63.4 WEIGHT LOSS: ICD-10-CM

## 2024-03-29 DIAGNOSIS — A04.8 H. PYLORI INFECTION: ICD-10-CM

## 2024-03-29 DIAGNOSIS — E66.9 OBESITY WITH BODY MASS INDEX 30 OR GREATER: ICD-10-CM

## 2024-03-29 LAB
HBA1C MFR BLD: 6.2 % (ref ?–5.8)
POCT INT CON NEG: NEGATIVE
POCT INT CON POS: POSITIVE

## 2024-03-29 RX ORDER — LISINOPRIL 30 MG/1
30 TABLET ORAL DAILY
Qty: 90 TABLET | Refills: 1 | Status: SHIPPED | OUTPATIENT
Start: 2024-03-29

## 2024-03-29 RX ORDER — OMEPRAZOLE 20 MG/1
20 CAPSULE, DELAYED RELEASE ORAL DAILY
Qty: 30 CAPSULE | Refills: 1 | Status: SHIPPED | OUTPATIENT
Start: 2024-03-29

## 2024-03-29 RX ORDER — ASPIRIN 81 MG/1
81 TABLET, CHEWABLE ORAL DAILY
Qty: 100 TABLET | Refills: 1 | Status: SHIPPED | OUTPATIENT
Start: 2024-03-29

## 2024-03-29 RX ORDER — GLUCOSAMINE HCL/CHONDROITIN SU 500-400 MG
CAPSULE ORAL
Qty: 100 EACH | Refills: 1 | Status: SHIPPED | OUTPATIENT
Start: 2024-03-29

## 2024-03-29 RX ORDER — ATORVASTATIN CALCIUM 40 MG/1
40 TABLET, FILM COATED ORAL NIGHTLY
Qty: 90 TABLET | Refills: 1 | Status: SHIPPED | OUTPATIENT
Start: 2024-03-29

## 2024-03-29 RX ORDER — LEVOTHYROXINE SODIUM 0.15 MG/1
150 TABLET ORAL
Qty: 90 TABLET | Refills: 1 | Status: SHIPPED | OUTPATIENT
Start: 2024-03-29

## 2024-03-29 RX ORDER — LANCETS 30 GAUGE
EACH MISCELLANEOUS
Qty: 100 EACH | Refills: 1 | Status: SHIPPED | OUTPATIENT
Start: 2024-03-29

## 2024-03-29 NOTE — PATIENT INSTRUCTIONS
Make sure you reach out to [facility] for the repeat H pylori test   Insulin refill has already been sent - start taking at 15 units. - decrease or increase insulin by 2 units every 2 days to keep morning fasting sugar between 80 - 120 .   Watch out for low sugars - keep something sweet in pockets - juice if possible.   I will place refills for all meds we talked about today

## 2024-03-29 NOTE — PROGRESS NOTES
Chief Complaint   Patient presents with    Follow-Up       HPI: Isa Espinoza is a 52 y.o. female with past medical history as below who presented to the clinic  for the following     Patient is undergoing colonoscopy on Thursday- stopped aspirin as per Gi recs. Wants recommendations on insulin .Metformin will be held the day of procedure as per Gi recs.       *Type DM   - Aic of 6.7 in jUly 2022 , was on metformin 500 SR, > march, 2023 was  8> September 2023> 14 >jan 2024- 7.4> March 2024 - 6.2    - patient reports that she was taking 28 units until about a week ago when she ran out and patient reports that the phramacy was out of medications.   Patient also reports that she ran out of metformin   While taking the 28 units she was having fasting morning sugars in 90s   However the most recent sugars checked this morning was 129 despite no insulin   -Patient will be picking up her insulin along with metformin,   Instructed to start at 15 units and to titrate up or down by 2 units every 2 days to keep sugars in the range of 80 - 120 while fasting   Hypoglycemia explained and cautioned - patient expressed understanding     - positive for nocturia, however has decreased in frequency compared to before, waking up 2-3 times compared to 5 times -not discussed today   -not excessively hungry and not that thirsty as per patient - trying to cut down not yet called nutritionist as they had to cancel - patient will ask  regarding rescheduling  - not discussed today - patient has unintentionally lost weight   -Patient regularly sees optometrist, however with new onset blurry vision as described previously right worse than left - no flashes floaters- requested for follow up with eye doctor- this symptoms is better now as per patient -not discussed today  -denies Any neuropathy, monofilament test in clinic  last visit  was negative.  -albumin creatinine ratio showing proteinuria= 85, renal function 15/1.03 with  egfr 63 of , with UA showing ketones, protein glucose, high specific gravity, squamous epithelial cell, hyaline cast and few bacteria with no burning urgency, fevers chills, flank pain at this time         *Cerumen in right ear   -Ear lavage performed today     *Hypertension -  Has history of CVA.    Is on lisinopril 30 mg - 2 day left - needs refill - elevated today - patient reports that it is because it was probably high when she walked in to clinic       *Hypothyroidism   -TSH of 26 in 2015, restarted on levothyroxine 150 mcg. With intermittent non adherence, most recent TSH wnl from September 2023    Weight lost  States that her menstrual cycles are intermittently irregular   Has been taking levothyroxine regularly     *History of CVA with substance use at the time   -TIA like symptoms happened  - 2 years ago-in 2021?- when she had slurry speech, vision changes on one eye, was emotional, seen at [facility] with resolution of symptoms in a few hours with recommendation to follow up with [facility] which she did not do at the time due to resolution of symptoms and then forgot about it. Patient was smoking cannabis and also methamphetamine at the time. Patient recently had an eye exam done at [facility] and was told that she has vision changes suggestive of a stroke for which an MRI brain was performed in August 2022 which confirmed the presence of a  old left temporal occipital stroke with chronic microvascular ischemic changes.      doing well with glasses both for near and far sightedness ( 2 separate glasses) and still drives. No new vision changes.  Echo cardiogram in April 2021 showed trace mR, AI and Trace TR  Patient has run out of as aspirin and atorvastatin and is asking for refill today    *Substance use disorder   Patient unfortunately has relapsed to using methamphetamine and marijuana   Patient reports that she is worried about her older son who is addicted to heroin - and is emotional  "talking about this   She was offered psychology help at this time however patient defers   Also discussed social work referral as patient is currently unemployed and lives with granpa and sisters kids and does not have place of her own and her older son lives in the shed of the house - she is hoping to get her own place one day -   Patient tells me that she will contemplate on that offer and follow up in 2 weeks to discuss that   smokes  cigarettes- not discussed in detail   -Social alcohol use.     *Weight loss   16lb los from jan   Cancer screening as below   Patient will need repeat thyroid function study which will be ordered on the next visit as this problem was not addressed in detail due to lack of time   Patient does report that it is not an intentional weight loss, however has slightly increased level of activity   Currently unfortunately has relapsed to using substances   Sugars are better controlled hence not contributing to the loss     H pylori   Recent EGD revealed h pylori   Patient reports that she is status post antibiotics , and received a letter from [facility] after that and that she has not looked at the letter yet   Patient was instructed today to makes sure she gets retested and the consequences of untreated h pylori was explained which patient was already aware of       *Preventative health  -Patient reports that she has received her shingles vaccine  -status [ost colonoscopy in 2024 - recall in 7 years.  -Mammogram last done in March 2023 which showed scattered areas of fibroglandular density with no radiographic evidence of malignancy  -Last Pap smear- \" decades ago - will schedule in a month                             Other problems not discussed on this visit    *Dizziness   -reports that she has had almost no episodes that were noticeable since last visit   -happens randomly even when sitting - for few seconds - patient has to distract herself - shake it off, take a walk and it goes away . . " Feels like room spinning   Reports that her blood pressures and sugars have been normal during these episodes, however patient is unable to tell me the numbers.   Encouraged to check them and write them down   Neuro exam including cerebellar, romberg were all normal   Has not been drinking enough water   Ear exam - wax on right ear - feels clicking on that side - instructed to use debrox     #DLD   -ldl of 69 as per most recent labs in September 2023- at goal on atorvastatin 40 and lifestyle changes   , as per labs done on 03/13/2023  MRI does show chronic microvascular ischemic changes      *Feeling full in throat   -resolved - back on omeprazole.      #Hot flashes   -Not bothering her too much   She sleeps with her window open and carries around a fan everywhere.     #Erythrocytosis   -oxygen saturation of 97%.   -Unclear if patient has sleep apnea. Unsure if she snores.   STOP BANG without that is intermediate, neck circumference not measured. Patient has not had a chance to ask son regarding snoring       #Metabolic acidosis  resolved    #Transaminitis   Resolved    *CKD stage 3a> CKD stage 2  As per most recent labs.   albumin creatinine ratio showing proteinuria= 85, renal function 15/1.03 with egfr 63 of , with UA showing ketones, protein glucose, high specific gravity, squamous epithelial cell, hyaline cast and few bacteria with no burning urgency, fevers chills, flank pain at this time          Last labs in September 2023.  -Albumin creatinine ratio of 85.  -LDL of 69, HDL of 33, total cholesterol 120, triglycerides 101  -Glucose at the time was 409  -BUNs/creatinine of 15/1.03, EGFR of 66.  -Hyponatremia with sodium of 133.  -Normal liver function test, normal total protein  -TSH from 9 was 1.17      Family History   Problem Relation Age of Onset    Stroke Mother         Patient states taht mother was way older than when she or her sister had stroked    Cancer Mother         States that it was somewhere  near stomach which had already spread and she went to hospice upon diagnosis    Stroke Father     Stroke Sister 45        Paralysed neck below    No Known Problems Paternal Aunt       Social History     Socioeconomic History    Marital status:    Tobacco Use    Smoking status: Some Days     Current packs/day: 0.25     Average packs/day: 0.3 packs/day for 29.0 years (7.3 ttl pk-yrs)     Types: Cigarettes    Smokeless tobacco: Former    Tobacco comments:     tried it chewing tobacco but never continued use   Vaping Use    Vaping Use: Never used   Substance and Sexual Activity    Alcohol use: Yes     Comment: on special occasions    Drug use: Yes     Frequency: 7.0 times per week     Types: Marijuana, Methamphetamines     Comment: Marijuana daily, methamphetamine use once 3-4 weeks          Patient Active Problem List    Diagnosis Date Noted    H. pylori infection 03/29/2024    Weight loss 03/29/2024    Impacted cerumen of right ear 03/29/2024    Dental infection 10/19/2023    GERD (gastroesophageal reflux disease) 10/06/2023    Erythrocytosis 03/19/2023    Dyslipidemia 03/19/2023    Hot flashes 03/19/2023    CKD (chronic kidney disease) stage 2, GFR 60-89 ml/min 02/19/2023    History of CVA (cerebrovascular accident) 04/27/2022    Blurry vision, bilateral 04/19/2021    Substance use disorder 04/19/2021    Hypothyroidism 08/11/2020    Type 2 diabetes mellitus with nephropathy (HCC) 08/11/2020    Dizziness 08/02/2018    Obesity with body mass index 30 or greater 07/13/2017    HTN (hypertension) 04/17/2015       Current Outpatient Medications   Medication Sig Dispense Refill    aspirin (ASA) 81 MG Chew Tab chewable tablet Chew 1 Tablet every day. 100 Tablet 1    atorvastatin (LIPITOR) 40 MG Tab Take 1 Tablet by mouth every evening. 90 Tablet 1    Insulin Pen Needle 32 G x 4 mm Use one pen needle in pen device to inject insulin once daily. 100 Each 1    Lancets Use one True Metrix lancet to test blood sugar once  "daily . 100 Each 1    levothyroxine (SYNTHROID) 150 MCG Tab Take 1 Tablet by mouth every morning on an empty stomach. 90 Tablet 1    lisinopril (PRINIVIL) 30 MG tablet Take 1 Tablet by mouth every day. 90 Tablet 1    metformin (GLUCOPHAGE) 1000 MG tablet Take 1 Tablet by mouth 2 times a day with meals. 60 Tablet 1    omeprazole (PRILOSEC) 20 MG delayed-release capsule Take 1 Capsule by mouth every day. 30 Capsule 1    Alcohol Swabs Wipe site with prep pad prior to injection. 100 Each 1    Insulin Glargine Solostar 100 UNIT/ML Solution Pen-injector Inject 28 Units under the skin every evening. 30 mL 1    Blood Glucose Test Strips Test strips order: Test strips for One Touch Verio meter. Sig: use once and prn ssx high or low sugar #100 RF x 3 100 Strip 3    Nutritional Supplements (GLUCOSE MANAGEMENT) Tab Take 4 Tablets by mouth as needed (for hypoglycemia). 15 Tablet 1    Blood Glucose Meter Kit Test blood sugar as recommended by provider. True Metrix blood glucose monitoring kit. (Patient not taking: Reported on 11/3/2023) 1 Kit 0     No current facility-administered medications for this visit.       ROS: As per HPI. Additional pertinent systems as noted below.  Constitutional:  Negative for fever, chills, feel hot - gets hot flashes    Cardiovascular:   denies any  palpitations at this time  Respiratory:  Negative for cough, sputum production,     BP (!) 145/82 (BP Location: Right arm, Patient Position: Sitting, BP Cuff Size: Adult)   Pulse 71   Temp (!) 35.4 °C (95.8 °F) (Temporal)   Ht 1.607 m (5' 3.25\")   Wt 108 kg (237 lb 12.8 oz)   LMP 03/30/2011   SpO2 97%   BMI 41.79 kg/m²     Physical Exam   Constitutional:  Well developed, well nourished. Not in acute distress   HEENT \" Cerumen on right ear  Cardiovascular:  Regular rate and rhythm, No pedal edema, Intact distal pulses   Respiratory: Clear to auscultation bilaterally, No use of accessory muscles        Note: I have reviewed all pertinent labs and " diagnostic tests associated with this visit with specific comments listed under the assessment and plan below    Assessment and Plan    1. Type 2 diabetes mellitus with nephropathy (HCC)  - Aic of 6.7 in jUly 2022 , was on metformin 500 SR, > march, 2023 was  8> September 2023> 14 >jan 2024- 7.4> March 2024 - 6.2    - patient reports that she was taking 28 units until about a week ago when she ran out and patient reports that the phramacy was out of medications.   Patient also reports that she ran out of metformin   While taking the 28 units she was having fasting morning sugars in 90s   However the most recent sugars checked this morning was 129 despite no insulin   -Patient will be picking up her insulin along with metformin,   Instructed to start at 15 units and to titrate up or down by 2 units every 2 days to keep sugars in the range of 80 - 120 while fasting   Hypoglycemia explained and cautioned - patient expressed understanding   -Microalbumin creatinine ratio next in september 2024  -Monofilament test next due in August 2024.    - POCT  A1C  - atorvastatin (LIPITOR) 40 MG Tab; Take 1 Tablet by mouth every evening.  Dispense: 90 Tablet; Refill: 1  - Insulin Pen Needle 32 G x 4 mm; Use one pen needle in pen device to inject insulin once daily.  Dispense: 100 Each; Refill: 1  - Lancets; Use one True Metrix lancet to test blood sugar once daily .  Dispense: 100 Each; Refill: 1  - metformin (GLUCOPHAGE) 1000 MG tablet; Take 1 Tablet by mouth 2 times a day with meals.  Dispense: 60 Tablet; Refill: 1  - Alcohol Swabs; Wipe site with prep pad prior to injection.  Dispense: 100 Each; Refill: 1    2. Cerebrovascular accident (CVA), unspecified mechanism (HCC)  Refills placed   Instructed patient to reach out before running out of meds next time  - aspirin (ASA) 81 MG Chew Tab chewable tablet; Chew 1 Tablet every day.  Dispense: 100 Tablet; Refill: 1  - atorvastatin (LIPITOR) 40 MG Tab; Take 1 Tablet by mouth every  evening.  Dispense: 90 Tablet; Refill: 1    3. Substance use disorder  Patient unfortunately has relapsed to using methamphetamine and marijuana   Patient reports that she is worried about her older son who is addicted to heroin - and is emotional talking about this   She was offered psychology help at this time however patient defers   Also discussed social work referral as patient is currently unemployed and lives with granpa and sisters kids and does not have place of her own and her older son lives in the shed of the house - she is hoping to get her own place one day -   Patient tells me that she will contemplate on that offer and follow up in 2 weeks to discuss that     4. Primary hypertension  Is on lisinopril 30 mg - 2 day left - needs refill - elevated today - patient reports that it is because it was probably high when she walked in to clinic   Will monitor in 2 weeks   - lisinopril (PRINIVIL) 30 MG tablet; Take 1 Tablet by mouth every day.  Dispense: 90 Tablet; Refill: 1    5. H. pylori infection  Recent EGD revealed h pylori   Patient reports that she is status post antibiotics , and received a letter from [facility] after that and that she has not looked at the letter yet   Patient was instructed today to makes sure she gets retested and the consequences of untreated h pylori was explained which patient was already aware of     6. Gastroesophageal reflux disease without esophagitis  Will trial weaning off omeprazole once h pylori eradication is confirmed   - omeprazole (PRILOSEC) 20 MG delayed-release capsule; Take 1 Capsule by mouth every day.  Dispense: 30 Capsule; Refill: 1    7. Obesity with body mass index 30 or greater  However lost weight unintentionally as per todays weight- hence not discussed today -     8. Weight loss  16lb los from jan   Cancer screening as below   Patient will need repeat thyroid function study which will be ordered on the next visit as this problem was not addressed in detail due  "to lack of time   Patient does report that it is not an intentional weight loss, however has slightly increased level of activity   Currently unfortunately has relapsed to using substances   Sugars are better controlled hence not contributing to the loss     -Patient reports that she has received her shingles vaccine  -status [ost colonoscopy in 2024 - recall in 7 years.  -Mammogram last done in March 2023 which showed scattered areas of fibroglandular density with no radiographic evidence of malignancy  -Last Pap smear- \" decades ago - will schedule in a month     9. Hypothyroidism, unspecified type  Will recheck TSH in 2 weeks when I see her again given weight loss . This was not discussed today   - levothyroxine (SYNTHROID) 150 MCG Tab; Take 1 Tablet by mouth every morning on an empty stomach.  Dispense: 90 Tablet; Refill: 1    10. Impacted cerumen of right ear  -Ear lavage performed today    Recommended not to use q tip as there is erythema in externa  There is also some scarring of TM - examine again on next visit            Followup: Return in about 2 weeks (around 4/12/2024).        Signed by: Kris White M.D.  "

## 2024-04-15 ENCOUNTER — APPOINTMENT (OUTPATIENT)
Dept: INTERNAL MEDICINE | Facility: OTHER | Age: 54
End: 2024-04-15
Payer: COMMERCIAL

## 2024-04-15 NOTE — PROGRESS NOTES
No chief complaint on file.      HPI: Isa Espinoza is a 52 y.o. female with past medical history as below who presented to the clinic  for the following     Patient is undergoing colonoscopy on Thursday- stopped aspirin as per Gi recs. Wants recommendations on insulin .Metformin will be held the day of procedure as per Gi recs.       *Type DM   - Aic of 6.7 in jUly 2022 , was on metformin 500 SR, > march, 2023 was  8> September 2023> 14 >jan 2024- 7.4> March 2024 - 6.2    - patient reports that she was taking 28 units until about a week ago when she ran out and patient reports that the phramacy was out of medications.   Patient also reports that she ran out of metformin   While taking the 28 units she was having fasting morning sugars in 90s   However the most recent sugars checked this morning was 129 despite no insulin   -Patient will be picking up her insulin along with metformin,   Instructed to start at 15 units and to titrate up or down by 2 units every 2 days to keep sugars in the range of 80 - 120 while fasting   Hypoglycemia explained and cautioned - patient expressed understanding     - positive for nocturia, however has decreased in frequency compared to before, waking up 2-3 times compared to 5 times -not discussed today   -not excessively hungry and not that thirsty as per patient - trying to cut down not yet called nutritionist as they had to cancel - patient will ask  regarding rescheduling  - not discussed today - patient has unintentionally lost weight   -Patient regularly sees optometrist, however with new onset blurry vision as described previously right worse than left - no flashes floaters- requested for follow up with eye doctor- this symptoms is better now as per patient -not discussed today  -denies Any neuropathy, monofilament test in clinic  last visit  was negative.  -albumin creatinine ratio showing proteinuria= 85, renal function 15/1.03 with egfr 63 of , with UA  showing ketones, protein glucose, high specific gravity, squamous epithelial cell, hyaline cast and few bacteria with no burning urgency, fevers chills, flank pain at this time         *Cerumen in right ear   -Ear lavage performed today     *Hypertension -  Has history of CVA.    Is on lisinopril 30 mg - 2 day left - needs refill - elevated today - patient reports that it is because it was probably high when she walked in to clinic       *Hypothyroidism   -TSH of 26 in 2015, restarted on levothyroxine 150 mcg. With intermittent non adherence, most recent TSH wnl from September 2023    Weight lost  States that her menstrual cycles are intermittently irregular   Has been taking levothyroxine regularly     *History of CVA with substance use at the time   -TIA like symptoms happened  - 2 years ago-in 2021?- when she had slurry speech, vision changes on one eye, was emotional, seen at [facility] with resolution of symptoms in a few hours with recommendation to follow up with [facility] clinic which she did not do at the time due to resolution of symptoms and then forgot about it. Patient was smoking cannabis and also methamphetamine at the time. Patient recently had an eye exam done at [facility] and was told that she has vision changes suggestive of a stroke for which an MRI brain was performed in August 2022 which confirmed the presence of a  old left temporal occipital stroke with chronic microvascular ischemic changes.      doing well with glasses both for near and far sightedness ( 2 separate glasses) and still drives. No new vision changes.  Echo cardiogram in April 2021 showed trace mR, AI and Trace TR  Patient has run out of as aspirin and atorvastatin and is asking for refill today    *Substance use disorder   Patient unfortunately has relapsed to using methamphetamine and marijuana   Patient reports that she is worried about her older son who is addicted to heroin - and is emotional talking about this   She  "was offered psychology help at this time however patient defers   Also discussed social work referral as patient is currently unemployed and lives with granpa and sisters kids and does not have place of her own and her older son lives in the shed of the house - she is hoping to get her own place one day -   Patient tells me that she will contemplate on that offer and follow up in 2 weeks to discuss that   smokes  cigarettes- not discussed in detail   -Social alcohol use.     *Weight loss   16lb los from jan   Cancer screening as below   Patient will need repeat thyroid function study which will be ordered on the next visit as this problem was not addressed in detail due to lack of time   Patient does report that it is not an intentional weight loss, however has slightly increased level of activity   Currently unfortunately has relapsed to using substances   Sugars are better controlled hence not contributing to the loss     H pylori   Recent EGD revealed h pylori   Patient reports that she is status post antibiotics , and received a letter from Gi after that and that she has not looked at the letter yet   Patient was instructed today to makes sure she gets retested and the consequences of untreated h pylori was explained which patient was already aware of       *Preventative health  -Patient reports that she has received her shingles vaccine  -status [ost colonoscopy in 2024 - recall in 7 years.  -Mammogram last done in March 2023 which showed scattered areas of fibroglandular density with no radiographic evidence of malignancy  -Last Pap smear- \" decades ago - will schedule in a month                             Other problems not discussed on this visit    *Dizziness   -reports that she has had almost no episodes that were noticeable since last visit   -happens randomly even when sitting - for few seconds - patient has to distract herself - shake it off, take a walk and it goes away . . Feels like room spinning "   Reports that her blood pressures and sugars have been normal during these episodes, however patient is unable to tell me the numbers.   Encouraged to check them and write them down   Neuro exam including cerebellar, romberg were all normal   Has not been drinking enough water   Ear exam - wax on right ear - feels clicking on that side - instructed to use debrox     #DLD   -ldl of 69 as per most recent labs in September 2023- at goal on atorvastatin 40 and lifestyle changes   , as per labs done on 03/13/2023  MRI does show chronic microvascular ischemic changes      *Feeling full in throat   -resolved - back on omeprazole.      #Hot flashes   -Not bothering her too much   She sleeps with her window open and carries around a fan everywhere.     #Erythrocytosis   -oxygen saturation of 97%.   -Unclear if patient has sleep apnea. Unsure if she snores.   STOP BANG without that is intermediate, neck circumference not measured. Patient has not had a chance to ask son regarding snoring       #Metabolic acidosis  resolved    #Transaminitis   Resolved    *CKD stage 3a> CKD stage 2  As per most recent labs.   albumin creatinine ratio showing proteinuria= 85, renal function 15/1.03 with egfr 63 of , with UA showing ketones, protein glucose, high specific gravity, squamous epithelial cell, hyaline cast and few bacteria with no burning urgency, fevers chills, flank pain at this time          Last labs in September 2023.  -Albumin creatinine ratio of 85.  -LDL of 69, HDL of 33, total cholesterol 120, triglycerides 101  -Glucose at the time was 409  -BUNs/creatinine of 15/1.03, EGFR of 66.  -Hyponatremia with sodium of 133.  -Normal liver function test, normal total protein  -TSH from 9 was 1.17      Family History   Problem Relation Age of Onset    Stroke Mother         Patient states taht mother was way older than when she or her sister had stroked    Cancer Mother         States that it was somewhere near stomach which had  already spread and she went to hospice upon diagnosis    Stroke Father     Stroke Sister 45        Paralysed neck below    No Known Problems Paternal Aunt       Social History     Socioeconomic History    Marital status:    Tobacco Use    Smoking status: Some Days     Current packs/day: 0.25     Average packs/day: 0.3 packs/day for 29.0 years (7.3 ttl pk-yrs)     Types: Cigarettes    Smokeless tobacco: Former    Tobacco comments:     tried it chewing tobacco but never continued use   Vaping Use    Vaping Use: Never used   Substance and Sexual Activity    Alcohol use: Yes     Comment: on special occasions    Drug use: Yes     Frequency: 7.0 times per week     Types: Marijuana, Methamphetamines     Comment: Marijuana daily, methamphetamine use once 3-4 weeks          Patient Active Problem List    Diagnosis Date Noted    H. pylori infection 03/29/2024    Weight loss 03/29/2024    Impacted cerumen of right ear 03/29/2024    Dental infection 10/19/2023    GERD (gastroesophageal reflux disease) 10/06/2023    Erythrocytosis 03/19/2023    Dyslipidemia 03/19/2023    Hot flashes 03/19/2023    CKD (chronic kidney disease) stage 2, GFR 60-89 ml/min 02/19/2023    History of CVA (cerebrovascular accident) 04/27/2022    Blurry vision, bilateral 04/19/2021    Substance use disorder 04/19/2021    Hypothyroidism 08/11/2020    Type 2 diabetes mellitus with nephropathy (HCC) 08/11/2020    Dizziness 08/02/2018    Obesity with body mass index 30 or greater 07/13/2017    HTN (hypertension) 04/17/2015       Current Outpatient Medications   Medication Sig Dispense Refill    aspirin (ASA) 81 MG Chew Tab chewable tablet Chew 1 Tablet every day. 100 Tablet 1    atorvastatin (LIPITOR) 40 MG Tab Take 1 Tablet by mouth every evening. 90 Tablet 1    Insulin Pen Needle 32 G x 4 mm Use one pen needle in pen device to inject insulin once daily. 100 Each 1    Lancets Use one True Metrix lancet to test blood sugar once daily . 100 Each 1     "levothyroxine (SYNTHROID) 150 MCG Tab Take 1 Tablet by mouth every morning on an empty stomach. 90 Tablet 1    lisinopril (PRINIVIL) 30 MG tablet Take 1 Tablet by mouth every day. 90 Tablet 1    metformin (GLUCOPHAGE) 1000 MG tablet Take 1 Tablet by mouth 2 times a day with meals. 60 Tablet 1    omeprazole (PRILOSEC) 20 MG delayed-release capsule Take 1 Capsule by mouth every day. 30 Capsule 1    Alcohol Swabs Wipe site with prep pad prior to injection. 100 Each 1    Insulin Glargine Solostar 100 UNIT/ML Solution Pen-injector Inject 28 Units under the skin every evening. 30 mL 1    Blood Glucose Test Strips Test strips order: Test strips for One Touch Verio meter. Sig: use once and prn ssx high or low sugar #100 RF x 3 100 Strip 3    Nutritional Supplements (GLUCOSE MANAGEMENT) Tab Take 4 Tablets by mouth as needed (for hypoglycemia). 15 Tablet 1    Blood Glucose Meter Kit Test blood sugar as recommended by provider. True Metrix blood glucose monitoring kit. (Patient not taking: Reported on 11/3/2023) 1 Kit 0     No current facility-administered medications for this visit.       ROS: As per HPI. Additional pertinent systems as noted below.  Constitutional:  Negative for fever, chills, feel hot - gets hot flashes    Cardiovascular:   denies any  palpitations at this time  Respiratory:  Negative for cough, sputum production,     LMP 03/30/2011     Physical Exam   Constitutional:  Well developed, well nourished. Not in acute distress   HEENT \" Cerumen on right ear  Cardiovascular:  Regular rate and rhythm, No pedal edema, Intact distal pulses   Respiratory: Clear to auscultation bilaterally, No use of accessory muscles        Note: I have reviewed all pertinent labs and diagnostic tests associated with this visit with specific comments listed under the assessment and plan below    Assessment and Plan    1. Type 2 diabetes mellitus with nephropathy (HCC)  - Aic of 6.7 in jUly 2022 , was on metformin 500 SR, > march, " 2023 was  8> September 2023> 14 >jan 2024- 7.4> March 2024 - 6.2    - patient reports that she was taking 28 units until about a week ago when she ran out and patient reports that the phramacy was out of medications.   Patient also reports that she ran out of metformin   While taking the 28 units she was having fasting morning sugars in 90s   However the most recent sugars checked this morning was 129 despite no insulin   -Patient will be picking up her insulin along with metformin,   Instructed to start at 15 units and to titrate up or down by 2 units every 2 days to keep sugars in the range of 80 - 120 while fasting   Hypoglycemia explained and cautioned - patient expressed understanding   -Microalbumin creatinine ratio next in september 2024  -Monofilament test next due in August 2024.    - POCT  A1C  - atorvastatin (LIPITOR) 40 MG Tab; Take 1 Tablet by mouth every evening.  Dispense: 90 Tablet; Refill: 1  - Insulin Pen Needle 32 G x 4 mm; Use one pen needle in pen device to inject insulin once daily.  Dispense: 100 Each; Refill: 1  - Lancets; Use one True Metrix lancet to test blood sugar once daily .  Dispense: 100 Each; Refill: 1  - metformin (GLUCOPHAGE) 1000 MG tablet; Take 1 Tablet by mouth 2 times a day with meals.  Dispense: 60 Tablet; Refill: 1  - Alcohol Swabs; Wipe site with prep pad prior to injection.  Dispense: 100 Each; Refill: 1    2. Cerebrovascular accident (CVA), unspecified mechanism (HCC)  Refills placed   Instructed patient to reach out before running out of meds next time  - aspirin (ASA) 81 MG Chew Tab chewable tablet; Chew 1 Tablet every day.  Dispense: 100 Tablet; Refill: 1  - atorvastatin (LIPITOR) 40 MG Tab; Take 1 Tablet by mouth every evening.  Dispense: 90 Tablet; Refill: 1    3. Substance use disorder  Patient unfortunately has relapsed to using methamphetamine and marijuana   Patient reports that she is worried about her older son who is addicted to heroin - and is emotional  talking about this   She was offered psychology help at this time however patient defers   Also discussed social work referral as patient is currently unemployed and lives with granpa and sisters kids and does not have place of her own and her older son lives in the shed of the house - she is hoping to get her own place one day -   Patient tells me that she will contemplate on that offer and follow up in 2 weeks to discuss that     4. Primary hypertension  Is on lisinopril 30 mg - 2 day left - needs refill - elevated today - patient reports that it is because it was probably high when she walked in to clinic   Will monitor in 2 weeks   - lisinopril (PRINIVIL) 30 MG tablet; Take 1 Tablet by mouth every day.  Dispense: 90 Tablet; Refill: 1    5. H. pylori infection  Recent EGD revealed h pylori   Patient reports that she is status post antibiotics , and received a letter from Gi after that and that she has not looked at the letter yet   Patient was instructed today to makes sure she gets retested and the consequences of untreated h pylori was explained which patient was already aware of     6. Gastroesophageal reflux disease without esophagitis  Will trial weaning off omeprazole once h pylori eradication is confirmed   - omeprazole (PRILOSEC) 20 MG delayed-release capsule; Take 1 Capsule by mouth every day.  Dispense: 30 Capsule; Refill: 1    7. Obesity with body mass index 30 or greater  However lost weight unintentionally as per todays weight- hence not discussed today -     8. Weight loss  16lb los from jan   Cancer screening as below   Patient will need repeat thyroid function study which will be ordered on the next visit as this problem was not addressed in detail due to lack of time   Patient does report that it is not an intentional weight loss, however has slightly increased level of activity   Currently unfortunately has relapsed to using substances   Sugars are better controlled hence not contributing to the  "loss     -Patient reports that she has received her shingles vaccine  -status [ost colonoscopy in 2024 - recall in 7 years.  -Mammogram last done in March 2023 which showed scattered areas of fibroglandular density with no radiographic evidence of malignancy  -Last Pap smear- \" decades ago - will schedule in a month     9. Hypothyroidism, unspecified type  Will recheck TSH in 2 weeks when I see her again given weight loss . This was not discussed today   - levothyroxine (SYNTHROID) 150 MCG Tab; Take 1 Tablet by mouth every morning on an empty stomach.  Dispense: 90 Tablet; Refill: 1    10. Impacted cerumen of right ear  -Ear lavage performed today    Recommended not to use q tip as there is erythema in externa  There is also some scarring of TM - examine again on next visit            Followup: No follow-ups on file.        Signed by: Kris White M.D.  "

## 2024-04-29 ENCOUNTER — OFFICE VISIT (OUTPATIENT)
Dept: INTERNAL MEDICINE | Facility: OTHER | Age: 54
End: 2024-04-29
Payer: COMMERCIAL

## 2024-04-29 VITALS
BODY MASS INDEX: 42.38 KG/M2 | DIASTOLIC BLOOD PRESSURE: 82 MMHG | WEIGHT: 239.2 LBS | OXYGEN SATURATION: 98 % | TEMPERATURE: 96.9 F | HEART RATE: 70 BPM | HEIGHT: 63 IN | SYSTOLIC BLOOD PRESSURE: 138 MMHG

## 2024-04-29 DIAGNOSIS — Z11.51 SPECIAL SCREENING EXAMINATION FOR HUMAN PAPILLOMAVIRUS (HPV): ICD-10-CM

## 2024-04-29 DIAGNOSIS — Z00.00 PREVENTATIVE HEALTH CARE: ICD-10-CM

## 2024-04-29 DIAGNOSIS — Z12.4 SCREENING FOR CERVICAL CANCER: ICD-10-CM

## 2024-04-29 NOTE — PROGRESS NOTES
"        Chief Complaint   Patient presents with    Gynecologic Exam     Pap smear       HPI: Isa Espinoza is a 52 y.o. female with past medical history as below who presented to the clinic  for the following     For pap smear   -has not had one in decades, Patient denies any abnormal vaginal discharge, no bleeding   -Exam - externally unremarkable, sample collected with bigger speculum and sent   Patient weight loss issues have resolved - she in fact gained two pounds .        *Preventative health  -Patient reports that she has received her shingles vaccine  -status [ost colonoscopy in 2024 - recall in 7 years.  -Mammogram last done in March 2023 which showed scattered areas of fibroglandular density with no radiographic evidence of malignancy  -Last Pap smear- \" decades ago - done today as above         She also brought up issues with feeling tired, abnormality in the rectal area.  Patient will be scheduling visit on Thursday or Friday this week to discuss these                    Other problems not discussed on this visit   *Type DM   - Aic of 6.7 in jUly 2022 , was on metformin 500 SR, > march, 2023 was  8> September 2023> 14 >jan 2024- 7.4> March 2024 - 6.2    - patient reports that she was taking 28 units until about a week ago when she ran out and patient reports that the phramacy was out of medications.   Patient also reports that she ran out of metformin   While taking the 28 units she was having fasting morning sugars in 90s   However the most recent sugars checked this morning was 129 despite no insulin   -Patient will be picking up her insulin along with metformin,   Instructed to start at 15 units and to titrate up or down by 2 units every 2 days to keep sugars in the range of 80 - 120 while fasting   Hypoglycemia explained and cautioned - patient expressed understanding   - positive for nocturia, however has decreased in frequency compared to before, waking up 2-3 times compared to 5 times -not " discussed today   -not excessively hungry and not that thirsty as per patient - trying to cut down not yet called nutritionist as they had to cancel - patient will ask  regarding rescheduling  - not discussed today - patient has unintentionally lost weight   -Patient regularly sees optometrist, however with new onset blurry vision as described previously right worse than left - no flashes floaters- requested for follow up with eye doctor- this symptoms is better now as per patient -not discussed today  -denies Any neuropathy, monofilament test in clinic  last visit  was negative.  -albumin creatinine ratio showing proteinuria= 85, renal function 15/1.03 with egfr 63 of , with UA showing ketones, protein glucose, high specific gravity, squamous epithelial cell, hyaline cast and few bacteria with no burning urgency, fevers chills, flank pain at this time     Instructed to start at 15 units and to titrate up or down by 2 units every 2 days to keep sugars in the range of 80 - 120 while fasting   *Cerumen in right ear   -Ear lavage performed today   *Hypertension -  Has history of CVA.    Is on lisinopril 30 mg - 2 day left - needs refill - elevated today - patient reports that it is because it was probably high when she walked in to clinic   *Hypothyroidism   -TSH of 26 in 2015, restarted on levothyroxine 150 mcg. With intermittent non adherence, most recent TSH wnl from September 2023    Weight lost  States that her menstrual cycles are intermittently irregular   Has been taking levothyroxine regularly   *History of CVA with substance use at the time   -TIA like symptoms happened  - 2 years ago-in 2021?- when she had slurry speech, vision changes on one eye, was emotional, seen at [facility] with resolution of symptoms in a few hours with recommendation to follow up with [facility] which she did not do at the time due to resolution of symptoms and then forgot about it. Patient was smoking cannabis and  also methamphetamine at the time. Patient recently had an eye exam done at [facility] and was told that she has vision changes suggestive of a stroke for which an MRI brain was performed in August 2022 which confirmed the presence of a  old left temporal occipital stroke with chronic microvascular ischemic changes.      doing well with glasses both for near and far sightedness ( 2 separate glasses) and still drives. No new vision changes.  Echo cardiogram in April 2021 showed trace mR, AI and Trace TR  Patient has run out of as aspirin and atorvastatin and is asking for refill today  *Substance use disorder   Patient unfortunately has relapsed to using methamphetamine and marijuana   Patient reports that she is worried about her older son who is addicted to heroin - and is emotional talking about this   She was offered psychology help at this time however patient defers   Also discussed social work referral as patient is currently unemployed and lives with granpa and sisters kids and does not have place of her own and her older son lives in the shed of the house - she is hoping to get her own place one day -   Patient tells me that she will contemplate on that offer and follow up in 2 weeks to discuss that   smokes  cigarettes- not discussed in detail   -Social alcohol use.   *Weight loss   16lb los from jan   Cancer screening as below   Patient will need repeat thyroid function study which will be ordered on the next visit as this problem was not addressed in detail due to lack of time   Patient does report that it is not an intentional weight loss, however has slightly increased level of activity   Currently unfortunately has relapsed to using substances   Sugars are better controlled hence not contributing to the loss   H pylori   Recent EGD revealed h pylori   Patient reports that she is status post antibiotics , and received a letter from Gi after that and that she has not looked at the letter yet   Patient  was instructed today to makes sure she gets retested and the consequences of untreated h pylori was explained which patient was already aware of   *Dizziness   -reports that she has had almost no episodes that were noticeable since last visit   -happens randomly even when sitting - for few seconds - patient has to distract herself - shake it off, take a walk and it goes away . . Feels like room spinning   Reports that her blood pressures and sugars have been normal during these episodes, however patient is unable to tell me the numbers.   Encouraged to check them and write them down   Neuro exam including cerebellar, romberg were all normal   Has not been drinking enough water   Ear exam - wax on right ear - feels clicking on that side - instructed to use debrox   #DLD   -ldl of 69 as per most recent labs in September 2023- at goal on atorvastatin 40 and lifestyle changes   , as per labs done on 03/13/2023  MRI does show chronic microvascular ischemic changes    *Feeling full in throat   -resolved - back on omeprazole.     #Hot flashes   -Not bothering her too much   She sleeps with her window open and carries around a fan everywhere.   #Erythrocytosis   -oxygen saturation of 97%.   -Unclear if patient has sleep apnea. Unsure if she snores.   STOP BANG without that is intermediate, neck circumference not measured. Patient has not had a chance to ask son regarding snoring   #Metabolic acidosis  resolved  #Transaminitis   Resolved  *CKD stage 3a> CKD stage 2  As per most recent labs.   albumin creatinine ratio showing proteinuria= 85, renal function 15/1.03 with egfr 63 of , with UA showing ketones, protein glucose, high specific gravity, squamous epithelial cell, hyaline cast and few bacteria with no burning urgency, fevers chills, flank pain at this time     Last labs in September 2023.  -Albumin creatinine ratio of 85.  -LDL of 69, HDL of 33, total cholesterol 120, triglycerides 101  -Glucose at the time was  409  -BUNs/creatinine of 15/1.03, EGFR of 66.  -Hyponatremia with sodium of 133.  -Normal liver function test, normal total protein  -TSH from 9 was 1.17      Family History   Problem Relation Age of Onset    Stroke Mother         Patient states taht mother was way older than when she or her sister had stroked    Cancer Mother         States that it was somewhere near stomach which had already spread and she went to hospice upon diagnosis    Stroke Father     Stroke Sister 45        Paralysed neck below    No Known Problems Paternal Aunt       Social History     Socioeconomic History    Marital status:    Tobacco Use    Smoking status: Some Days     Current packs/day: 0.25     Average packs/day: 0.3 packs/day for 29.0 years (7.3 ttl pk-yrs)     Types: Cigarettes    Smokeless tobacco: Former    Tobacco comments:     tried it chewing tobacco but never continued use   Vaping Use    Vaping Use: Never used   Substance and Sexual Activity    Alcohol use: Yes     Comment: on special occasions    Drug use: Yes     Frequency: 7.0 times per week     Types: Marijuana, Methamphetamines     Comment: Marijuana daily, methamphetamine use once 3-4 weeks          Patient Active Problem List    Diagnosis Date Noted    H. pylori infection 03/29/2024    Weight loss 03/29/2024    Impacted cerumen of right ear 03/29/2024    Dental infection 10/19/2023    GERD (gastroesophageal reflux disease) 10/06/2023    Erythrocytosis 03/19/2023    Dyslipidemia 03/19/2023    Hot flashes 03/19/2023    CKD (chronic kidney disease) stage 2, GFR 60-89 ml/min 02/19/2023    History of CVA (cerebrovascular accident) 04/27/2022    Blurry vision, bilateral 04/19/2021    Substance use disorder 04/19/2021    Hypothyroidism 08/11/2020    Type 2 diabetes mellitus with nephropathy (HCC) 08/11/2020    Dizziness 08/02/2018    Obesity with body mass index 30 or greater 07/13/2017    HTN (hypertension) 04/17/2015       Current Outpatient Medications  "  Medication Sig Dispense Refill    aspirin (ASA) 81 MG Chew Tab chewable tablet Chew 1 Tablet every day. 100 Tablet 1    atorvastatin (LIPITOR) 40 MG Tab Take 1 Tablet by mouth every evening. 90 Tablet 1    Insulin Pen Needle 32 G x 4 mm Use one pen needle in pen device to inject insulin once daily. 100 Each 1    Lancets Use one True Metrix lancet to test blood sugar once daily . 100 Each 1    levothyroxine (SYNTHROID) 150 MCG Tab Take 1 Tablet by mouth every morning on an empty stomach. 90 Tablet 1    lisinopril (PRINIVIL) 30 MG tablet Take 1 Tablet by mouth every day. 90 Tablet 1    metformin (GLUCOPHAGE) 1000 MG tablet Take 1 Tablet by mouth 2 times a day with meals. 60 Tablet 1    omeprazole (PRILOSEC) 20 MG delayed-release capsule Take 1 Capsule by mouth every day. 30 Capsule 1    Alcohol Swabs Wipe site with prep pad prior to injection. 100 Each 1    Insulin Glargine Solostar 100 UNIT/ML Solution Pen-injector Inject 28 Units under the skin every evening. 30 mL 1    Blood Glucose Test Strips Test strips order: Test strips for One Touch Verio meter. Sig: use once and prn ssx high or low sugar #100 RF x 3 100 Strip 3    Nutritional Supplements (GLUCOSE MANAGEMENT) Tab Take 4 Tablets by mouth as needed (for hypoglycemia). 15 Tablet 1    Blood Glucose Meter Kit Test blood sugar as recommended by provider. True Metrix blood glucose monitoring kit. (Patient not taking: Reported on 11/3/2023) 1 Kit 0     No current facility-administered medications for this visit.       ROS: As per HPI. Additional pertinent systems as noted below.  Constitutional:  Negative for fever, chills, feel hot - gets hot flashes        /82 (BP Location: Left arm, Patient Position: Sitting, BP Cuff Size: Large adult)   Pulse 70   Temp 36.1 °C (96.9 °F) (Temporal)   Ht 1.607 m (5' 3.25\")   Wt 109 kg (239 lb 3.2 oz)   LMP 03/30/2011   SpO2 98%   BMI 42.04 kg/m²     Physical Exam   Constitutional:  . Not in acute distress    : " "-Exam - externally unremarkable, sample collected with bigger speculum and sent     Note: I have reviewed all pertinent labs and diagnostic tests associated with this visit with specific comments listed under the assessment and plan below    Assessment and Plan     1. Screening for cervical cancer    - THINPREP PAP WITH HPV; Future    2. Preventative health care  -Patient reports that she has received her shingles vaccine  -status [ost colonoscopy in 2024 - recall in 7 years.  -Mammogram last done in March 2023 which showed scattered areas of fibroglandular density with no radiographic evidence of malignancy  -Last Pap smear- \" decades ago - done today as above    -HIV screening and hep c will be discussed on next visit       Followup: Return in about 1 week (around 5/6/2024).        Signed by: Kris White M.D.  "

## 2024-05-06 DIAGNOSIS — Z12.4 SCREENING FOR CERVICAL CANCER: ICD-10-CM

## 2024-05-07 ENCOUNTER — TELEPHONE (OUTPATIENT)
Dept: INTERNAL MEDICINE | Facility: OTHER | Age: 54
End: 2024-05-07
Payer: COMMERCIAL

## 2024-05-07 NOTE — TELEPHONE ENCOUNTER
Phone Number Called: [phone]    Call outcome: Did not leave a detailed message. Requested patient to call back.    Message: Requested patient call back to discuss results.

## 2024-05-07 NOTE — TELEPHONE ENCOUNTER
----- Message from Kris White M.D. sent at 5/7/2024  7:52 AM PDT -----  Looks like patient is not active on mychart since December 2023   Please let patient know that the pap smear result is normal and that she needs to schedule a follow up for diabetes     -Kris White MD

## 2024-05-10 NOTE — TELEPHONE ENCOUNTER
Phone Number Called: [phone] (home)     Call outcome:  Spoke to son(?)    Message: Did not relay information to son just in case of HIPAA, but called pt again for 2nd time regarding results. Son will relay message to call us back when she is available for her results. 2nd attempt

## 2024-07-09 DIAGNOSIS — E11.21 TYPE 2 DIABETES MELLITUS WITH NEPHROPATHY (HCC): ICD-10-CM

## 2024-07-10 RX ORDER — INSULIN GLARGINE-YFGN 100 [IU]/ML
28 INJECTION, SOLUTION SUBCUTANEOUS EVERY EVENING
Qty: 30 ML | Refills: 1 | Status: SHIPPED | OUTPATIENT
Start: 2024-07-10

## 2024-09-20 DIAGNOSIS — I10 PRIMARY HYPERTENSION: ICD-10-CM

## 2024-09-20 NOTE — TELEPHONE ENCOUNTER
Received request via: Pharmacy    Was the patient seen in the last year in this department? Yes    Does the patient have an active prescription (recently filled or refills available) for medication(s) requested? No    Pharmacy Name:   [id number] - [place], [place] - [address] AT [facility] [address] [id number]  Phone: [phone] Fax: [phone]    Does the patient have halfway Plus and need 100-day supply? (This applies to ALL medications) Patient does not have SCP

## 2024-09-24 RX ORDER — LISINOPRIL 30 MG/1
30 TABLET ORAL DAILY
Qty: 90 TABLET | Refills: 1 | Status: SHIPPED | OUTPATIENT
Start: 2024-09-24

## 2024-10-09 DIAGNOSIS — K21.9 GASTROESOPHAGEAL REFLUX DISEASE WITHOUT ESOPHAGITIS: ICD-10-CM

## 2024-11-10 DIAGNOSIS — E11.21 TYPE 2 DIABETES MELLITUS WITH NEPHROPATHY (HCC): ICD-10-CM

## 2024-11-11 NOTE — TELEPHONE ENCOUNTER
Received request via: Pharmacy    Was the patient seen in the last year in this department? Yes    Does the patient have an active prescription (recently filled or refills available) for medication(s) requested? No    Pharmacy Name: [facility] [address]    Does the patient have longterm Plus and need 100-day supply? (This applies to ALL medications) Patient does not have SCP

## 2024-11-15 NOTE — TELEPHONE ENCOUNTER
Me to [address]  Regarding result: metformin (GLUCOPHAGE) 1000 MG tablet       11/11/24  5:12 PM  Please try and schedule a follow up for the patient. She has not been seen since April 2024

## 2024-11-27 DIAGNOSIS — E11.21 TYPE 2 DIABETES MELLITUS WITH NEPHROPATHY (HCC): ICD-10-CM

## 2024-11-27 DIAGNOSIS — I63.9 CEREBROVASCULAR ACCIDENT (CVA), UNSPECIFIED MECHANISM (HCC): ICD-10-CM

## 2024-11-29 DIAGNOSIS — E03.9 HYPOTHYROIDISM, UNSPECIFIED TYPE: ICD-10-CM

## 2024-12-02 RX ORDER — ATORVASTATIN CALCIUM 40 MG/1
40 TABLET, FILM COATED ORAL NIGHTLY
Qty: 90 TABLET | Refills: 1 | Status: SHIPPED | OUTPATIENT
Start: 2024-12-02

## 2024-12-02 RX ORDER — LEVOTHYROXINE SODIUM 150 UG/1
150 TABLET ORAL
Qty: 30 TABLET | Refills: 1 | Status: SHIPPED | OUTPATIENT
Start: 2024-12-02

## 2024-12-02 NOTE — TELEPHONE ENCOUNTER
Received request via: Pharmacy    Was the patient seen in the last year in this department? Yes    Does the patient have an active prescription (recently filled or refills available) for medication(s) requested? No    [facility]     Does the patient have long-term Plus and need 100-day supply? (This applies to ALL medications) Patient does not have SCP      Patient is scheduled for a follow up on 12/26/24.

## 2024-12-02 NOTE — TELEPHONE ENCOUNTER
Refill for levothyroxine has been sent only for 30 days as we may need updated labs to ensure that she does not need a change in dose.

## 2024-12-02 NOTE — TELEPHONE ENCOUNTER
Received request via: Pharmacy    Was the patient seen in the last year in this department? Yes    Does the patient have an active prescription (recently filled or refills available) for medication(s) requested? No    [facility]     Does the patient have MCFP Plus and need 100-day supply? (This applies to ALL medications) Patient does not have SCP      Patient is scheduled for a follow up on 12/26/24.

## 2024-12-06 DIAGNOSIS — I63.9 CEREBROVASCULAR ACCIDENT (CVA), UNSPECIFIED MECHANISM (HCC): ICD-10-CM

## 2024-12-06 NOTE — TELEPHONE ENCOUNTER
Received request via: Pharmacy    Was the patient seen in the last year in this department? Yes    Does the patient have an active prescription (recently filled or refills available) for medication(s) requested? No    [facility]    Does the patient have MCFP Plus and need 100-day supply? (This applies to ALL medications) Patient does not have SCP

## 2024-12-09 RX ORDER — ASPIRIN 81 MG/1
81 TABLET, CHEWABLE ORAL DAILY
Qty: 90 TABLET | Refills: 1 | Status: SHIPPED | OUTPATIENT
Start: 2024-12-09

## 2024-12-26 ENCOUNTER — OFFICE VISIT (OUTPATIENT)
Dept: INTERNAL MEDICINE | Facility: OTHER | Age: 54
End: 2024-12-26
Payer: COMMERCIAL

## 2024-12-26 VITALS
TEMPERATURE: 96.6 F | BODY MASS INDEX: 41.89 KG/M2 | OXYGEN SATURATION: 97 % | HEIGHT: 63 IN | SYSTOLIC BLOOD PRESSURE: 150 MMHG | HEART RATE: 77 BPM | DIASTOLIC BLOOD PRESSURE: 95 MMHG | WEIGHT: 236.4 LBS

## 2024-12-26 DIAGNOSIS — E03.9 HYPOTHYROIDISM, UNSPECIFIED TYPE: ICD-10-CM

## 2024-12-26 DIAGNOSIS — Z86.73 HISTORY OF CVA (CEREBROVASCULAR ACCIDENT): ICD-10-CM

## 2024-12-26 DIAGNOSIS — K21.9 GASTROESOPHAGEAL REFLUX DISEASE WITHOUT ESOPHAGITIS: ICD-10-CM

## 2024-12-26 DIAGNOSIS — E78.5 DYSLIPIDEMIA: ICD-10-CM

## 2024-12-26 DIAGNOSIS — D75.1 ERYTHROCYTOSIS: ICD-10-CM

## 2024-12-26 DIAGNOSIS — M54.50 ACUTE BILATERAL LOW BACK PAIN WITHOUT SCIATICA: ICD-10-CM

## 2024-12-26 DIAGNOSIS — M46.1 SACROILIITIS (HCC): ICD-10-CM

## 2024-12-26 DIAGNOSIS — M54.50 ACUTE MIDLINE LOW BACK PAIN WITHOUT SCIATICA: ICD-10-CM

## 2024-12-26 DIAGNOSIS — I10 PRIMARY HYPERTENSION: ICD-10-CM

## 2024-12-26 DIAGNOSIS — E11.21 TYPE 2 DIABETES MELLITUS WITH NEPHROPATHY (HCC): ICD-10-CM

## 2024-12-26 PROCEDURE — 3080F DIAST BP >= 90 MM HG: CPT | Performed by: INTERNAL MEDICINE

## 2024-12-26 PROCEDURE — 3077F SYST BP >= 140 MM HG: CPT | Performed by: INTERNAL MEDICINE

## 2024-12-26 PROCEDURE — 99214 OFFICE O/P EST MOD 30 MIN: CPT | Performed by: INTERNAL MEDICINE

## 2024-12-26 NOTE — PROGRESS NOTES
Chief Complaint   Patient presents with    Diabetes    Medication Refill     Requesting refills of omeprazole     Low Back Pain     Lower back pain fpr 2 months       HPI: Isa Espinoza is a 52 y.o. female with past medical history as below who presented to the clinic  for the following after a long gap in follow-up.        *low back pain.  -Reports midline pain radiating to both sides in the low back which is worse with standing straight not present on lying down.  Slightly better with sitting and standing on 1 leg at a time.  Denies any radiation to either leg  -Not associated with any numbness or tingling or weakness.  -Not associate with hip pain, groin pain.  -Denies having had any falls or lifted anything heavy unusually.  -On examination midline tenderness at the lower lumbar spine and paraspinal region including sacroiliac joint present.  -Has tried heat which apparently made it worse however is not sure if it was because it was too hot.  Has not tried lidocaine patch yet.  -Has not tried Tylenol or ibuprofen yet.  -Recommended x-ray given midline tenderness, physical therapy for which order has been placed.  -Tylenol as needed try to avoid ibuprofen given history of GERD.      *GERD.  -Patient requesting for refill of omeprazole which has been placed however this issue was not discussed in detail on this visit.    *Right hand tingling.  -Reports ongoing for the past 1 to 2 months.  -On the first and third digits.  -Does not feel objects normally as compared to the other hand.  -Has not dropped any objects.  -On examination Tinel and phallen sign positive.  -Discussed using a brace, reassess in 1 month when we see patient for        *Hypertension -  Has history of CVA.    Was Well managed with lisinopril 30 mg -   -Elevated today in clinic, patient reports that she randomly checks blood pressures at home and it has been less than 150/90, has seen numbers  as low as 120 systolic.   -Reports taking  medication regularly not running out of it.  -Were encouraged to keep a log of blood pressures and bring to 1 month follow-up visit  -Recreational substance use not discussed during this visit-something to bring up on the next visit as this could be a potential cause for hypertension        *Type DM   - Aic of 6.7 in jUly 2022 , was on metformin 500 SR, > march, 2023 was  8> September 2023> 14 > A1c y in jan 2024 7.4 > March 2024 - 6.2  - taking metformin 2000 mg regularly- tolerating glucopghage well, patient is currently taking 34 units of insulin glargine at nighttime, [patient self titrated up from 28 units when we last saw each other to 34 units )  -Patient has not been checking blood sugars.  -Encouraged to check blood sugars at least fasting state and bring a log to the next visit so we can make adjustments along with blood work.  -Repeat microalbumin creatinine ratio was ordered    On previous visit following more positive, these were not discussed on this visit.  - positive for nocturia, however has decreased in frequency compared to before, waking up 2-3 times compared to 5 times -  -not excessively hungry and not that thirsty as per patient - trying to cut down not yet called nutritionist as they had to cancel - patient will ask  regarding rescheduling    -Patient regularly sees optometrist, however with new onset blurry vision as described previously right worse than left - no flashes floaters- requested for follow up with eye doctor- this symptoms is better now as per patient -  -denies Any neuropathy, monofilament test in clinic  last visit  was negative.  -From sept 2023- albumin creatinine ratio showing proteinuria= 85, renal function 15/1.03 with egfr 63 of , with UA showing ketones, protein glucose, high specific gravity, squamous epithelial cell, hyaline cast and few bacteria with no burning urgency, fevers chills, flank pain at this time     -Denies any constipation.  .                          Other problems not discussed on this visit  *Dizziness   -reports that she has had almost no episodes that were noticeable since last visit   -happens randomly even when sitting - for few seconds - patient has to distract herself - shake it off, take a walk and it goes away . . Feels like room spinning   Reports that her blood pressures and sugars have been normal during these episodes, however patient is unable to tell me the numbers.   Encouraged to check them and write them down   Neuro exam including cerebellar, romberg were all normal   Has not been drinking enough water   Ear exam - wax on right ear - feels clicking on that side - instructed to use debrox   Cerumen in ear   -Plan as above  #DLD   -ldl of 69 as per most recent labs in September 2023- at goal on atorvastatin 40 and lifestyle changes   ,   MRI does show chronic microvascular ischemic changes    *Feeling full in throat   -resolved - back on omeprazole.   #Hot flashes   -Not bothering her too much   She sleeps with her window open and carries around a fan everywhere.   #Erythrocytosis   -oxygen saturation of 97%.   -Unclear if patient has sleep apnea. Unsure if she snores.   STOP BANG : intermediate, neck circumference not measured. Patient has not had a chance to ask son regarding snoring   *Substance use disorder   -did have intermittent relapses however has been abstinent from meth since last visit and 1 month prior to that atleast as paer patient   -Dental procedure has not been scheduled yet- appointment in december  -Currently smoking marijuana every other day  -States that she smokes a few cigarettes a month  -Social alcohol use.   -Patient had not presented to appointments for a few months after which she presents today.  *Hypothyroidism   -TSH of 26 in 2015, restarted on levothyroxine 150 mcg. With intermittent non adherence, most recent TSH wnl from September 2023    Weight has been up and down, currently down  trending . States that her menstrual cycles are intermittently irregular   *History of CVA with substance use at the time   -TIA like symptoms happened  - 2 years ago-in 2021?- when she had slurry speech, vision changes on one eye, was emotional, seen at [facility] with resolution of symptoms in a few hours with recommendation to follow up with [facility] clinic which she did not do at the time due to resolution of symptoms and then forgot about it. Patient was smoking cannabis and also methamphetamine at the time. Patient recently had an eye exam done at [facility] and was told that she has vision changes suggestive of a stroke for which an MRI brain was performed in August 2022 which confirmed the presence of a  old left temporal occipital stroke with chronic microvascular ischemic changes.     Patient currently states that she has worse vision on left eye, however doing well with glasses both for near and far sightedness ( 2 separate glasses) and still drives. No new vision changes.  Echo cardiogram in April 2021 showed trace mR, AI and Trace TR   *CKD stage 3a> CKD stage 2  As per most recent labs.   albumin creatinine ratio showing proteinuria= 85, renal function 15/1.03 with egfr 63 of , with UA showing ketones, protein glucose, high specific gravity, squamous epithelial cell, hyaline cast and few bacteria with no burning urgency, fevers chills, flank pain at this time         *Preventative health  -Patient reports that she has received her shingles vaccine  -She reports that she has an appointment for colonoscopy, still awaiting a callback from them to schedule an appointment she will be calling them since she has not heard back from them for 3 weeks.                   Family History   Problem Relation Age of Onset    Stroke Mother         Patient states taht mother was way older than when she or her sister had stroked    Cancer Mother         States that it was somewhere near stomach which had already  spread and she went to hospice upon diagnosis    Stroke Father     Stroke Sister 45        Paralysed neck below    No Known Problems Paternal Aunt       Social History     Socioeconomic History    Marital status:    Tobacco Use    Smoking status: Some Days     Current packs/day: 0.25     Average packs/day: 0.3 packs/day for 29.0 years (7.3 ttl pk-yrs)     Types: Cigarettes    Smokeless tobacco: Former    Tobacco comments:     tried it chewing tobacco but never continued use   Vaping Use    Vaping status: Never Used   Substance and Sexual Activity    Alcohol use: Yes     Comment: on special occasions    Drug use: Yes     Frequency: 7.0 times per week     Types: Marijuana, Methamphetamines     Comment: Marijuana daily, methamphetamine use once 3-4 weeks          Patient Active Problem List    Diagnosis Date Noted    Preventative health care 04/29/2024    H. pylori infection 03/29/2024    Weight loss 03/29/2024    Impacted cerumen of right ear 03/29/2024    Dental infection 10/19/2023    GERD (gastroesophageal reflux disease) 10/06/2023    Erythrocytosis 03/19/2023    Dyslipidemia 03/19/2023    Hot flashes 03/19/2023    CKD (chronic kidney disease) stage 2, GFR 60-89 ml/min 02/19/2023    History of CVA (cerebrovascular accident) 04/27/2022    Blurry vision, bilateral 04/19/2021    Substance use disorder 04/19/2021    Hypothyroidism 08/11/2020    Type 2 diabetes mellitus with nephropathy (HCC) 08/11/2020    Dizziness 08/02/2018    Obesity with body mass index 30 or greater 07/13/2017    HTN (hypertension) 04/17/2015       Current Outpatient Medications   Medication Sig Dispense Refill    omeprazole (PRILOSEC) 20 MG delayed-release capsule Take 1 Capsule by mouth every day. 30 Capsule 1    aspirin (ASA) 81 MG Chew Tab chewable tablet CHEW AND SWALLOW 1 TABLET BY MOUTH EVERY DAY 90 Tablet 1    atorvastatin (LIPITOR) 40 MG Tab TAKE 1 TABLET BY MOUTH EVERY EVENING 90 Tablet 1    levothyroxine (SYNTHROID) 150 MCG Tab  TAKE 1 TABLET BY MOUTH EVERY MORNING ON AN EMPTY STOMACH 30 Tablet 1    metformin (GLUCOPHAGE) 1000 MG tablet TAKE 1 TABLET BY MOUTH TWICE DAILY WITH MEALS 60 Tablet 1    glucose blood (ONETOUCH VERIO) strip TEST BLOOD SUGAR ONCE DAILY AND AS NEEDED FOR SYMPTOMS OF HIGH OR LOW BLOOD SUGAR 100 Strip 1    lisinopril (PRINIVIL) 30 MG tablet TAKE 1 TABLET BY MOUTH EVERY DAY 90 Tablet 1    Insulin Glargine-yfgn 100 UNIT/ML Solution Pen-injector Inject 28 Units under the skin every evening. If not injecting 28 units at this time, then continue the current dose and titrate by 2 units every 2 days to maintain morning fasting sugar between 100-120. (Patient taking differently: Inject 34 Units under the skin every evening. If not injecting 28 units at this time, then continue the current dose and titrate by 2 units every 2 days to maintain morning fasting sugar between 100-120.) 30 mL 1    Lancets USE ONE LANCET TO TEST BLOOD SUGAR ONCE DAILY 100 Each 1    Insulin Pen Needle 32 G x 4 mm Use one pen needle in pen device to inject insulin once daily. 100 Each 1    Alcohol Swabs Wipe site with prep pad prior to injection. 100 Each 1    Blood Glucose Test Strips Test strips order: Test strips for One Touch Verio meter. Sig: use once and prn ssx high or low sugar #100 RF x 3 100 Strip 3    Blood Glucose Meter Kit Test blood sugar as recommended by provider. True Metrix blood glucose monitoring kit. 1 Kit 0    Nutritional Supplements (GLUCOSE MANAGEMENT) Tab Take 4 Tablets by mouth as needed (for hypoglycemia). (Patient not taking: Reported on 12/26/2024) 15 Tablet 1     No current facility-administered medications for this visit.       ROS: As per HPI. Additional pertinent systems as noted below.  Constitutional:  Negative for fever, chills   Cardiovascular:   denies any  palpitations at this time  Respiratory:  Negative for cough, sputum production,   Gastrointestinal: Negative for abdominal pain, nausea, vomiting, , or blood in  "stools . Positive as in hpi- constipation better   Genitourinary: Negative for dysuria, flank pain and hematuria. Positive for nocturia  Extremities: Negative for leg swelling   Neurologic: Negative for headaches, , seizures, weakness . Endo/Heme/Allergies: Positive for  nocturia.  Denies any polyphagia    BP (!) 150/95 (BP Location: Right arm, Patient Position: Sitting, BP Cuff Size: Adult)   Pulse 77   Temp 35.9 °C (96.6 °F) (Temporal)   Ht 1.607 m (5' 3.25\")   Wt 107 kg (236 lb 6.4 oz)   LMP 03/30/2011   SpO2 97%   BMI 41.55 kg/m²     Physical Exam   Constitutional:  Well developed, well nourished. Not in acute distress   Cardiovascular:  Regular rate and rhythm, No pedal edema, Intact distal pulses   Respiratory: Clear to auscultation bilaterally, No use of accessory muscles    Neurologic: Alert & oriented x 4, no cerebellar signs, normal romberg    Note: I have reviewed all pertinent labs and diagnostic tests associated with this visit with specific comments listed under the assessment and plan below    Assessment and Plan    1. Sacroiliitis (HCC)  -Has tried heat which apparently made it worse however is not sure if it was because it was too hot.  Has not tried lidocaine patch yet.  -Has not tried Tylenol or ibuprofen yet.  -Recommended x-ray given midline tenderness, physical therapy for which order has been placed.  -Tylenol as needed try to avoid ibuprofen given history of GERD.  - Referral to Physical Therapy  - DX-SACRUM AND COCCYX 2+; Future    2. Acute bilateral low back pain without sciatica  -Has tried heat which apparently made it worse however is not sure if it was because it was too hot.  Has not tried lidocaine patch yet.  -Has not tried Tylenol or ibuprofen yet.  -Recommended x-ray given midline tenderness, physical therapy for which order has been placed.  -Tylenol as needed try to avoid ibuprofen given history of GERD.  - Referral to Physical Therapy    3. Acute midline low back pain " without sciatica  -Has tried heat which apparently made it worse however is not sure if it was because it was too hot.  Has not tried lidocaine patch yet.  -Has not tried Tylenol or ibuprofen yet.  -Recommended x-ray given midline tenderness, physical therapy for which order has been placed.  -Tylenol as needed try to avoid ibuprofen given history of GERD.  - DX-LUMBAR SPINE-2 OR 3 VIEWS; Future    4. Gastroesophageal reflux disease without esophagitis  -Patient requesting for refill of omeprazole which has been placed however this issue was not discussed in detail on this visit.  - omeprazole (PRILOSEC) 20 MG delayed-release capsule; Take 1 Capsule by mouth every day.  Dispense: 30 Capsule; Refill: 1  - Lipid Profile; Future    5. Type 2 diabetes mellitus with nephropathy (HCC)  - Aic of 6.7 in jUly 2022 , was on metformin 500 SR, > march, 2023 was  8> September 2023> 14 > A1c y in jan 2024 7.4 > March 2024 - 6.2  - taking metformin 2000 mg regularly- tolerating glucopghage well, patient is currently taking 34 units of insulin glargine at nighttime, [patient self titrated up from 28 units when we last saw each other to 34 units )  -Patient has not been checking blood sugars.  -Encouraged to check blood sugars at least fasting state and bring a log to the next visit so we can make adjustments along with blood work.  -Repeat microalbumin creatinine ratio was ordered    - HEMOGLOBIN A1C; Future  - MICROALBUMIN CREAT RATIO URINE; Future    6. History of CVA (cerebrovascular accident)    - Comp Metabolic Panel; Future    7. Primary hypertension   Has history of CVA.    Was Well managed with lisinopril 30 mg -   -Elevated today in clinic, patient reports that she randomly checks blood pressures at home and it has been less than 150/90, has seen numbers  as low as 120 systolic.   -Reports taking medication regularly not running out of it.  -Were encouraged to keep a log of blood pressures and bring to 1 month follow-up  visit  -Recreational substance use not discussed during this visit-something to bring up on the next visit as this could be a potential cause for hypertension  - Comp Metabolic Panel; Future    8. Dyslipidemia    - Lipid Profile; Future    9. Hypothyroidism, unspecified type    - TSH WITH REFLEX TO FT4; Future    10. Erythrocytosis    - CBC WITHOUT DIFFERENTIAL; Future       Followup: Return in about 1 month (around 1/26/2025).        Signed by: Kris White M.D.

## 2025-01-07 ENCOUNTER — TELEPHONE (OUTPATIENT)
Dept: INTERNAL MEDICINE | Facility: OTHER | Age: 55
End: 2025-01-07
Payer: COMMERCIAL

## 2025-01-07 DIAGNOSIS — K21.9 GASTROESOPHAGEAL REFLUX DISEASE WITHOUT ESOPHAGITIS: ICD-10-CM

## 2025-01-07 DIAGNOSIS — E03.9 HYPOTHYROIDISM, UNSPECIFIED TYPE: ICD-10-CM

## 2025-01-07 DIAGNOSIS — E11.21 TYPE 2 DIABETES MELLITUS WITH NEPHROPATHY (HCC): ICD-10-CM

## 2025-01-07 DIAGNOSIS — Z86.73 HISTORY OF CVA (CEREBROVASCULAR ACCIDENT): ICD-10-CM

## 2025-01-07 DIAGNOSIS — E78.5 DYSLIPIDEMIA: ICD-10-CM

## 2025-01-07 DIAGNOSIS — I10 PRIMARY HYPERTENSION: ICD-10-CM

## 2025-01-07 NOTE — TELEPHONE ENCOUNTER
Called patient to discuss lab results . Conveyed to her that the A1c is slighter higher than before - recommended to continue checking sugars and keeping log to bring to next appt with Dr. Aviles- to make adjustment to insulin/medications as needed.   Notably patient also has TSH that is elevated   Patient has lost weight since last appt.   Patient however admits that she has missed several doses of the thyroid medication - hence together decided to recheck thyroid function in 6 weeks - ( can be ordered when seen by Dr. Busby.)  She was recommended to work on taking medication regularly and also to not combine with other meds if possible.    Additionally patient informs me that her sister was diagnosed of leukemia and wanted to know if she should be worried.   Explained to patient that when we see her we will do as assessment of that.     Additionally I had ordered a CBC to follow up on erythrocytosis that was noted on previous labs - however that lab does not seem to have been drawn - hence would recommend checking that along with the repeat thyroid 6 weeks from now- will forward this note to Traci Barnhart so that he is aware.     -Kris White MD

## 2025-01-26 DIAGNOSIS — E11.21 TYPE 2 DIABETES MELLITUS WITH NEPHROPATHY (HCC): ICD-10-CM

## 2025-01-27 RX ORDER — PEN NEEDLE, DIABETIC 32GX 5/32"
NEEDLE, DISPOSABLE MISCELLANEOUS
Qty: 100 EACH | Refills: 0 | Status: SHIPPED | OUTPATIENT
Start: 2025-01-27

## 2025-01-27 NOTE — TELEPHONE ENCOUNTER
Received request via: Pharmacy    Was the patient seen in the last year in this department? Yes    Does the patient have an active prescription (recently filled or refills available) for medication(s) requested? No    Pharmacy Name: [id number] - [place], [place] - [address] [[id number]]     Does the patient have snf Plus and need 100-day supply? (This applies to ALL medications) Patient does not have SCP

## 2025-01-28 ENCOUNTER — OFFICE VISIT (OUTPATIENT)
Dept: INTERNAL MEDICINE | Facility: OTHER | Age: 55
End: 2025-01-28
Payer: COMMERCIAL

## 2025-01-28 VITALS
WEIGHT: 253.4 LBS | TEMPERATURE: 97.2 F | HEART RATE: 77 BPM | SYSTOLIC BLOOD PRESSURE: 151 MMHG | DIASTOLIC BLOOD PRESSURE: 84 MMHG | HEIGHT: 64 IN | OXYGEN SATURATION: 96 % | BODY MASS INDEX: 43.26 KG/M2

## 2025-01-28 DIAGNOSIS — R80.9 TYPE 2 DIABETES MELLITUS WITH DIABETIC MICROALBUMINURIA, WITH LONG-TERM CURRENT USE OF INSULIN (HCC): ICD-10-CM

## 2025-01-28 DIAGNOSIS — N18.2 CKD (CHRONIC KIDNEY DISEASE) STAGE 2, GFR 60-89 ML/MIN: ICD-10-CM

## 2025-01-28 DIAGNOSIS — Z12.31 BREAST CANCER SCREENING BY MAMMOGRAM: ICD-10-CM

## 2025-01-28 DIAGNOSIS — Z79.4 TYPE 2 DIABETES MELLITUS WITH DIABETIC MICROALBUMINURIA, WITH LONG-TERM CURRENT USE OF INSULIN (HCC): ICD-10-CM

## 2025-01-28 DIAGNOSIS — E11.29 TYPE 2 DIABETES MELLITUS WITH DIABETIC MICROALBUMINURIA, WITH LONG-TERM CURRENT USE OF INSULIN (HCC): ICD-10-CM

## 2025-01-28 DIAGNOSIS — E66.813 CLASS 3 SEVERE OBESITY DUE TO EXCESS CALORIES WITH BODY MASS INDEX (BMI) OF 40.0 TO 44.9 IN ADULT, UNSPECIFIED WHETHER SERIOUS COMORBIDITY PRESENT (HCC): ICD-10-CM

## 2025-01-28 DIAGNOSIS — K21.9 GASTROESOPHAGEAL REFLUX DISEASE, UNSPECIFIED WHETHER ESOPHAGITIS PRESENT: ICD-10-CM

## 2025-01-28 DIAGNOSIS — A04.8 HELICOBACTER PYLORI INFECTION: ICD-10-CM

## 2025-01-28 DIAGNOSIS — E66.9 OBESITY WITH BODY MASS INDEX 30 OR GREATER: ICD-10-CM

## 2025-01-28 DIAGNOSIS — E66.01 CLASS 3 SEVERE OBESITY DUE TO EXCESS CALORIES WITH BODY MASS INDEX (BMI) OF 40.0 TO 44.9 IN ADULT, UNSPECIFIED WHETHER SERIOUS COMORBIDITY PRESENT (HCC): ICD-10-CM

## 2025-01-28 DIAGNOSIS — E03.9 HYPOTHYROIDISM, UNSPECIFIED TYPE: ICD-10-CM

## 2025-01-28 DIAGNOSIS — I10 PRIMARY HYPERTENSION: ICD-10-CM

## 2025-01-28 DIAGNOSIS — F19.10 SUBSTANCE ABUSE (HCC): ICD-10-CM

## 2025-01-28 PROCEDURE — 3077F SYST BP >= 140 MM HG: CPT | Mod: GC

## 2025-01-28 PROCEDURE — 3079F DIAST BP 80-89 MM HG: CPT | Mod: GC

## 2025-01-28 PROCEDURE — 99214 OFFICE O/P EST MOD 30 MIN: CPT | Mod: GC

## 2025-01-28 RX ORDER — DULAGLUTIDE 0.75 MG/.5ML
0.75 INJECTION, SOLUTION SUBCUTANEOUS
Qty: 6 ML | Refills: 2 | Status: SHIPPED | OUTPATIENT
Start: 2025-01-28 | End: 2025-04-28

## 2025-01-28 ASSESSMENT — ENCOUNTER SYMPTOMS
DIARRHEA: 0
CARDIOVASCULAR NEGATIVE: 1
CONSTIPATION: 0
CONSTITUTIONAL NEGATIVE: 1
EYES NEGATIVE: 1
NAUSEA: 1
PSYCHIATRIC NEGATIVE: 1
VOMITING: 0
MUSCULOSKELETAL NEGATIVE: 1
NEUROLOGICAL NEGATIVE: 1
RESPIRATORY NEGATIVE: 1
BLOOD IN STOOL: 0
ABDOMINAL PAIN: 0
HEARTBURN: 1

## 2025-01-28 ASSESSMENT — PATIENT HEALTH QUESTIONNAIRE - PHQ9: CLINICAL INTERPRETATION OF PHQ2 SCORE: 0

## 2025-01-28 NOTE — ASSESSMENT & PLAN NOTE
Last mammogram in March of 2023 with BI-RADS 2 category.    Plan:  -Repeat mammogram has been ordered.

## 2025-01-28 NOTE — PATIENT INSTRUCTIONS
We are starting ozempic weekly now.  Please bring BP log for next visit in a month.  For the H pylori breath test, please stop taking omeprazole for two weeks before having that test done.  We are referring you to nephrology for better kidney management.  Mammogram order was sent, please schedule it.

## 2025-01-28 NOTE — ASSESSMENT & PLAN NOTE
Recent Hb A1c of 7.4  Patient has brought a serum glucose log with values around 200 and above most of the time.    Plan  -Adding Trulicity to her current regimen for better glucose control.  -Continue glargine 34 units at night time.  -Continue metformin daily.  -Other diabetes topics to be discussed at next visit in a week.

## 2025-01-28 NOTE — ASSESSMENT & PLAN NOTE
1/28: /84.    Elevated recently.      Plan:    - Patient to be compliant with her Lisinopril 30 mg prescription daily.    - Bring BP log to the next visit.

## 2025-01-28 NOTE — ASSESSMENT & PLAN NOTE
Microalbumin/creatinine ratio increased.  Patient needs to establish care with nephrology at this point.  GFR is 60    Plan  -Nephrology referral has been sent.

## 2025-01-28 NOTE — ASSESSMENT & PLAN NOTE
The patient continues to mention heartburn. It is uncertain whether she was able to complete the antibiotic regimen last year. She still reports heartburn related to food consumption.    Plan:    - The patient has not been taking omeprazole for the last month.    - It is okay to proceed with the Helicobacter pylori breath test.

## 2025-01-28 NOTE — ASSESSMENT & PLAN NOTE
The patient is still smoking tobacco and marijuana. This was not fully addressed during this visit.    Plan:    - Discuss in more detail at the next visit.

## 2025-01-28 NOTE — PROGRESS NOTES
Reason for visit:    -Follow-up on lab results and other multiple conditions    HPI:    Isa Espinoza is a 54-year-old female patient with a past medical history of diabetes, hypertension, hypothyroidism, hyperlipidemia, chronic kidney disease, GERD, and tobacco and marijuana smoking. She presented today to review lab results and discuss multiple conditions.      We addressed as many aspects of her health as possible during this visit, with a primary focus on diabetes. Her recent hemoglobin A1C is 7.4, up from 6.2 in March of last year. The patient states that she has been compliant with her diabetes management, as outlined in her treatment plan. Based on her elevated BMI and rising A1C, we have decided to initiate a GLP-1 inhibitor at this time.      Hypertension: Her blood pressure today is 151/84. She has not been compliant with her Lisinopril 30 mg prescription recently.    Hypothyroidism: Her most recent TSH level is 10, with a normal T4. The patient admits to occasionally missing doses of her thyroid medication but reports no hypothyroid-related symptoms at this time.    Breast health: Her last mammogram was performed in 2022 and was classified as BI-RADS 2. A new order for a repeat mammogram has been sent.    GERD: The patient continues to report heartburn. She was diagnosed with Helicobacter pylori last year, but it is unclear if she completed the prescribed course of antibiotics, as she is unsure herself.    Kidney function: Her GFR is 60, with a creatinine level of 1.1. However, her microalbumin/creatinine ratio is elevated. A nephrology consult has been placed to establish specialty care.    Smoking history: The patient continues to smoke tobacco, approximately one pack every two weeks, and marijuana, approximately 2-3 bowls per day.      She denies any major constitutional symptoms and agrees with the plan as outlined below:      Review of Systems   Constitutional: Negative.    HENT: Negative.     Eyes:  "Negative.    Respiratory: Negative.     Cardiovascular: Negative.    Gastrointestinal:  Positive for heartburn and nausea. Negative for abdominal pain, blood in stool, constipation, diarrhea, melena and vomiting.   Genitourinary: Negative.    Musculoskeletal: Negative.    Skin: Negative.    Neurological: Negative.    Endo/Heme/Allergies: Negative.    Psychiatric/Behavioral: Negative.         BP (!) 151/84 (BP Location: Left arm, Patient Position: Sitting, BP Cuff Size: Large adult) Comment: 2nd attempt - has not taken BP med yet  Pulse 77   Temp 36.2 °C (97.2 °F) (Temporal)   Ht 1.613 m (5' 3.5\")   Wt 115 kg (253 lb 6.4 oz)   LMP 03/30/2011   SpO2 96%   BMI 44.18 kg/m²     Physical Exam  Constitutional:       Appearance: Normal appearance.   HENT:      Head: Normocephalic and atraumatic.      Nose: Nose normal.      Mouth/Throat:      Mouth: Mucous membranes are moist.   Eyes:      Extraocular Movements: Extraocular movements intact.      Conjunctiva/sclera: Conjunctivae normal.      Pupils: Pupils are equal, round, and reactive to light.   Cardiovascular:      Rate and Rhythm: Normal rate and regular rhythm.   Pulmonary:      Effort: Pulmonary effort is normal.      Breath sounds: Normal breath sounds.   Abdominal:      General: Bowel sounds are normal. There is distension (In the setting of obestiy).      Palpations: Abdomen is soft. There is no mass.      Tenderness: There is no abdominal tenderness. There is no right CVA tenderness, left CVA tenderness, guarding or rebound.      Hernia: No hernia is present.   Musculoskeletal:         General: Normal range of motion.      Cervical back: Normal range of motion and neck supple.   Skin:     General: Skin is warm.      Capillary Refill: Capillary refill takes less than 2 seconds.   Neurological:      General: No focal deficit present.      Mental Status: She is alert and oriented to person, place, and time. Mental status is at baseline.   Psychiatric:         " Mood and Affect: Mood normal.         Behavior: Behavior normal.         Thought Content: Thought content normal.         Judgment: Judgment normal.       Assessment and Plan:     Type 2 diabetes mellitus with diabetic microalbuminuria, with long-term current use of insulin ([facility])  Recent Hb A1c of 7.4  Patient has brought a serum glucose log with values around 200 and above most of the time.    Plan  -Adding Trulicity to her current regimen for better glucose control.  -Continue glargine 34 units at night time.  -Continue metformin daily.  -Other diabetes topics to be discussed at next visit in a week.     Obesity with body mass index 30 or greater  Encouraged better nutrition and increasing cardiovascular exercise.    Plan:  -Patient to schedule an appointment with nutrition services.  -increase cardiovascular exercise.    HTN (hypertension)  1/28: /84.    Elevated recently.      Plan:    - Patient to be compliant with her Lisinopril 30 mg prescription daily.    - Bring BP log to the next visit.     Helicobacter pylori infection  The patient continues to mention heartburn. It is uncertain whether she was able to complete the antibiotic regimen last year. She still reports heartburn related to food consumption.    Plan:    - The patient has not been taking omeprazole for the last month.    - It is okay to proceed with the Helicobacter pylori breath test.      Substance abuse (HCC)  The patient is still smoking tobacco and marijuana. This was not fully addressed during this visit.    Plan:    - Discuss in more detail at the next visit.     Breast cancer screening by mammogram  Last mammogram in March of 2023 with BI-RADS 2 category.    Plan:  -Repeat mammogram has been ordered.    CKD (chronic kidney disease) stage 2, GFR 60-89 ml/min  Microalbumin/creatinine ratio increased.  Patient needs to establish care with nephrology at this point.  GFR is 60    Plan  -Nephrology referral has been sent.      Orders  Placed This Encounter    MA-SCREENING MAMMO BILAT W/TOMOSYNTHESIS W/CAD    H. PYLORI BREATH TEST    Referral to Nephrology    Dulaglutide (TRULICITY) 0.75 MG/0.5ML Solution Auto-injector       Return in about 1 week (around 2/4/2025).      Patient case was seen/ assessed/ discussed with Dr. Bonner      Please note that this dictation was created using voice recognition software. I have made every reasonable attempt to correct obvious errors, but I expect that there are errors of grammar and possibly content that I did not discover before finalizing the note.       Signed by:    Rome Aviles M.D.    PGY-1 Internal Medicine Resident

## 2025-01-28 NOTE — ASSESSMENT & PLAN NOTE
Encouraged better nutrition and increasing cardiovascular exercise.    Plan:  -Patient to schedule an appointment with nutrition services.  -increase cardiovascular exercise.

## 2025-01-29 ENCOUNTER — HOSPITAL ENCOUNTER (OUTPATIENT)
Dept: LAB | Facility: MEDICAL CENTER | Age: 55
End: 2025-01-29
Attending: INTERNAL MEDICINE
Payer: COMMERCIAL

## 2025-01-29 ENCOUNTER — TELEPHONE (OUTPATIENT)
Dept: INTERNAL MEDICINE | Facility: OTHER | Age: 55
End: 2025-01-29

## 2025-01-29 ENCOUNTER — HOSPITAL ENCOUNTER (OUTPATIENT)
Dept: RADIOLOGY | Facility: MEDICAL CENTER | Age: 55
End: 2025-01-29
Attending: INTERNAL MEDICINE
Payer: COMMERCIAL

## 2025-01-29 ENCOUNTER — HOSPITAL ENCOUNTER (OUTPATIENT)
Dept: LAB | Facility: MEDICAL CENTER | Age: 55
End: 2025-01-29
Payer: COMMERCIAL

## 2025-01-29 DIAGNOSIS — D75.1 ERYTHROCYTOSIS: ICD-10-CM

## 2025-01-29 DIAGNOSIS — M46.1 SACROILIITIS (HCC): ICD-10-CM

## 2025-01-29 DIAGNOSIS — M54.50 ACUTE MIDLINE LOW BACK PAIN WITHOUT SCIATICA: ICD-10-CM

## 2025-01-29 DIAGNOSIS — Z79.4 TYPE 2 DIABETES MELLITUS WITH DIABETIC MICROALBUMINURIA, WITH LONG-TERM CURRENT USE OF INSULIN (HCC): ICD-10-CM

## 2025-01-29 DIAGNOSIS — R80.9 TYPE 2 DIABETES MELLITUS WITH DIABETIC MICROALBUMINURIA, WITH LONG-TERM CURRENT USE OF INSULIN (HCC): ICD-10-CM

## 2025-01-29 DIAGNOSIS — E11.29 TYPE 2 DIABETES MELLITUS WITH DIABETIC MICROALBUMINURIA, WITH LONG-TERM CURRENT USE OF INSULIN (HCC): ICD-10-CM

## 2025-01-29 LAB
ERYTHROCYTE [DISTWIDTH] IN BLOOD BY AUTOMATED COUNT: 46.4 FL (ref 35.9–50)
HCT VFR BLD AUTO: 45.8 % (ref 37–47)
HGB BLD-MCNC: 14.9 G/DL (ref 12–16)
MCH RBC QN AUTO: 30 PG (ref 27–33)
MCHC RBC AUTO-ENTMCNC: 32.5 G/DL (ref 32.2–35.5)
MCV RBC AUTO: 92.3 FL (ref 81.4–97.8)
PLATELET # BLD AUTO: 244 K/UL (ref 164–446)
PMV BLD AUTO: 12.8 FL (ref 9–12.9)
RBC # BLD AUTO: 4.96 M/UL (ref 4.2–5.4)
UREA BREATH TEST QL: NEGATIVE
WBC # BLD AUTO: 10.1 K/UL (ref 4.8–10.8)

## 2025-01-29 PROCEDURE — 36415 COLL VENOUS BLD VENIPUNCTURE: CPT

## 2025-01-29 PROCEDURE — 83013 H PYLORI (C-13) BREATH: CPT

## 2025-01-29 PROCEDURE — 85027 COMPLETE CBC AUTOMATED: CPT

## 2025-01-29 PROCEDURE — 72220 X-RAY EXAM SACRUM TAILBONE: CPT

## 2025-01-29 PROCEDURE — 72100 X-RAY EXAM L-S SPINE 2/3 VWS: CPT

## 2025-01-30 DIAGNOSIS — I10 PRIMARY HYPERTENSION: ICD-10-CM

## 2025-01-30 DIAGNOSIS — K21.9 GASTROESOPHAGEAL REFLUX DISEASE WITHOUT ESOPHAGITIS: ICD-10-CM

## 2025-01-30 NOTE — TELEPHONE ENCOUNTER
Received request via: Pharmacy    Was the patient seen in the last year in this department? Yes    Does the patient have an active prescription (recently filled or refills available) for medication(s) requested? Patient should have 1 refill of omeprazole left but no refills of lisinopril left.    Pharmacy Name: [facility] - [facility] [place]    Does the patient have assisted Plus and need 100-day supply? (This applies to ALL medications) Patient does not have SCP

## 2025-01-30 NOTE — TELEPHONE ENCOUNTER
FINAL PRIOR AUTHORIZATION STATUS:    1.  Name of Medication & Dose: Trulicity 0.75mg/0.5ml     2. Prior Auth Status: Denied.  Reason: Patient needs to try and fail invokana, farxiga, jardiance and victoza. Patient has only tried victoza and jardiance.    3. Action Taken: Pharmacy Notified: yes Patient Notified: no

## 2025-01-30 NOTE — TELEPHONE ENCOUNTER
DOCUMENTATION OF PAR STATUS:    1. Name of Medication & Dose: Trulicity 0.75mg/0.5ml     2. Name of Prescription Coverage Company & phone #: [email or web address]    3. Date Prior Auth Submitted: 1/29/25    4. What information was given to obtain insurance decision? Icd-10, medications tried and failed, A1c results, visit notes    5. Prior Auth Status? Pending    6. Patient Notified: yes

## 2025-02-03 DIAGNOSIS — Z79.4 TYPE 2 DIABETES MELLITUS WITH DIABETIC MICROALBUMINURIA, WITH LONG-TERM CURRENT USE OF INSULIN (HCC): ICD-10-CM

## 2025-02-03 DIAGNOSIS — E11.29 TYPE 2 DIABETES MELLITUS WITH DIABETIC MICROALBUMINURIA, WITH LONG-TERM CURRENT USE OF INSULIN (HCC): ICD-10-CM

## 2025-02-03 DIAGNOSIS — R80.9 TYPE 2 DIABETES MELLITUS WITH DIABETIC MICROALBUMINURIA, WITH LONG-TERM CURRENT USE OF INSULIN (HCC): ICD-10-CM

## 2025-02-03 DIAGNOSIS — N18.2 CKD (CHRONIC KIDNEY DISEASE) STAGE 2, GFR 60-89 ML/MIN: ICD-10-CM

## 2025-02-03 RX ORDER — OMEPRAZOLE 20 MG/1
20 CAPSULE, DELAYED RELEASE ORAL DAILY
Qty: 30 CAPSULE | OUTPATIENT
Start: 2025-02-03

## 2025-02-03 RX ORDER — LISINOPRIL 30 MG/1
30 TABLET ORAL DAILY
Qty: 30 TABLET | Refills: 0 | Status: SHIPPED | OUTPATIENT
Start: 2025-02-03 | End: 2025-02-24

## 2025-02-04 ENCOUNTER — APPOINTMENT (OUTPATIENT)
Dept: INTERNAL MEDICINE | Facility: OTHER | Age: 55
End: 2025-02-04
Payer: COMMERCIAL

## 2025-02-07 RX ORDER — DAPAGLIFLOZIN 10 MG/1
10 TABLET, FILM COATED ORAL DAILY
Qty: 100 TABLET | Refills: 0 | Status: SHIPPED | OUTPATIENT
Start: 2025-02-07 | End: 2025-05-18

## 2025-02-07 NOTE — TELEPHONE ENCOUNTER
Micaela sent Farxiga script. Pls let pt know.. insurance issues and hence the change from truilicity

## 2025-02-07 NOTE — TELEPHONE ENCOUNTER
Phone Number Called: [phone]    Call outcome: Spoke to patient regarding message below.    Message: Patient notify Trulicity not cover by her insurance change to [facility].

## 2025-02-09 ENCOUNTER — APPOINTMENT (OUTPATIENT)
Dept: RADIOLOGY | Facility: MEDICAL CENTER | Age: 55
End: 2025-02-09
Attending: STUDENT IN AN ORGANIZED HEALTH CARE EDUCATION/TRAINING PROGRAM
Payer: COMMERCIAL

## 2025-02-09 ENCOUNTER — HOSPITAL ENCOUNTER (EMERGENCY)
Facility: MEDICAL CENTER | Age: 55
End: 2025-02-09
Attending: STUDENT IN AN ORGANIZED HEALTH CARE EDUCATION/TRAINING PROGRAM
Payer: COMMERCIAL

## 2025-02-09 VITALS
DIASTOLIC BLOOD PRESSURE: 74 MMHG | HEART RATE: 86 BPM | TEMPERATURE: 98.1 F | SYSTOLIC BLOOD PRESSURE: 155 MMHG | BODY MASS INDEX: 45.23 KG/M2 | OXYGEN SATURATION: 95 % | RESPIRATION RATE: 18 BRPM | WEIGHT: 255.29 LBS | HEIGHT: 63 IN

## 2025-02-09 DIAGNOSIS — K08.89 DENTALGIA: ICD-10-CM

## 2025-02-09 DIAGNOSIS — M53.3 SACRAL PAIN: ICD-10-CM

## 2025-02-09 PROCEDURE — 700111 HCHG RX REV CODE 636 W/ 250 OVERRIDE (IP): Performed by: STUDENT IN AN ORGANIZED HEALTH CARE EDUCATION/TRAINING PROGRAM

## 2025-02-09 PROCEDURE — 72192 CT PELVIS W/O DYE: CPT

## 2025-02-09 PROCEDURE — 700102 HCHG RX REV CODE 250 W/ 637 OVERRIDE(OP): Mod: UD | Performed by: STUDENT IN AN ORGANIZED HEALTH CARE EDUCATION/TRAINING PROGRAM

## 2025-02-09 PROCEDURE — 99284 EMERGENCY DEPT VISIT MOD MDM: CPT

## 2025-02-09 PROCEDURE — 96372 THER/PROPH/DIAG INJ SC/IM: CPT

## 2025-02-09 PROCEDURE — A9270 NON-COVERED ITEM OR SERVICE: HCPCS | Mod: UD | Performed by: STUDENT IN AN ORGANIZED HEALTH CARE EDUCATION/TRAINING PROGRAM

## 2025-02-09 RX ORDER — NAPROXEN 500 MG/1
500 TABLET ORAL 2 TIMES DAILY WITH MEALS
Qty: 60 TABLET | Refills: 0 | Status: SHIPPED | OUTPATIENT
Start: 2025-02-09 | End: 2025-02-24 | Stop reason: SDUPTHER

## 2025-02-09 RX ORDER — AMOXICILLIN 500 MG/1
500 CAPSULE ORAL 3 TIMES DAILY
Qty: 21 CAPSULE | Refills: 0 | Status: ACTIVE | OUTPATIENT
Start: 2025-02-09 | End: 2025-02-16

## 2025-02-09 RX ORDER — KETOROLAC TROMETHAMINE 15 MG/ML
15 INJECTION, SOLUTION INTRAMUSCULAR; INTRAVENOUS ONCE
Status: COMPLETED | OUTPATIENT
Start: 2025-02-09 | End: 2025-02-09

## 2025-02-09 RX ORDER — AMOXICILLIN 500 MG/1
500 CAPSULE ORAL ONCE
Status: COMPLETED | OUTPATIENT
Start: 2025-02-09 | End: 2025-02-09

## 2025-02-09 RX ADMIN — AMOXICILLIN 500 MG: 500 CAPSULE ORAL at 16:39

## 2025-02-09 RX ADMIN — KETOROLAC TROMETHAMINE 15 MG: 15 INJECTION, SOLUTION INTRAMUSCULAR; INTRAVENOUS at 16:40

## 2025-02-09 ASSESSMENT — PAIN DESCRIPTION - PAIN TYPE: TYPE: ACUTE PAIN

## 2025-02-09 NOTE — ED TRIAGE NOTES
"Chief Complaint   Patient presents with    Cyst     Pt report a bump on her L buttock which is causing her pain. Per pt she's had x-ray done on her back last week but was not told about the result.     Dental Pain     Pt report L upper dental pain.        Pt ambulatory to triage. Pt A&Ox4, for above complaint.     Pt to lobby. Pt educated on alerting staff in changes to condition. Pt verbalized understanding.     BP (!) 160/139   Pulse (!) 103   Temp 36.4 °C (97.5 °F) (Temporal)   Resp 16   Ht 1.6 m (5' 3\")   Wt 116 kg (255 lb 4.7 oz)   LMP 03/30/2011   SpO2 98%   BMI 45.22 kg/m²    "

## 2025-02-10 NOTE — ED PROVIDER NOTES
ED Provider Note    CHIEF COMPLAINT  Chief Complaint   Patient presents with    Cyst     Pt report a bump on her L buttock which is causing her pain. Per pt she's had x-ray done on her back last week but was not told about the result.     Dental Pain     Pt report L upper dental pain.        EXTERNAL RECORDS REVIEWED  X-ray of lumbar spine sacrum and coccyx are unremarkable other than moderate osteoarthritic changes at the L4 L5 level    HPI/ROS  LIMITATION TO HISTORY   none  OUTSIDE HISTORIAN(S):  none    Isa Espinoza is a 54 y.o. female who presents with left gluteal/sacral pain.  She says that the pain has been on and off for couple months.  She said however the past few weeks it has been more persistent and seems to be worsening.  He denies any radicular pain.  No lower extremity numbness or weakness she says it feels like it is in the bones in her pelvis.  She said that she did have x-rays about a week ago and is unsure of the results.  She has no redness, warmth or any known skin lesions.  No fevers.  She also thinks that she has a dental infection.  He is having pain to the left upper molar.    PAST MEDICAL HISTORY   has a past medical history of Hypertension, Metabolic acidosis (03/19/2023), Non-adherence to medical treatment (04/17/2015), Thyroid disorder, Transaminitis (03/19/2023), and Type II or unspecified type diabetes mellitus without mention of complication, not stated as uncontrolled.    SURGICAL HISTORY  patient denies any surgical history    FAMILY HISTORY  Family History   Problem Relation Age of Onset    Stroke Mother         Patient states taht mother was way older than when she or her sister had stroked    Cancer Mother         States that it was somewhere near stomach which had already spread and she went to hospice upon diagnosis    Stroke Father     Stroke Sister 45        Paralysed neck below    No Known Problems Paternal Aunt        SOCIAL HISTORY  Social History     Tobacco Use     "Smoking status: Some Days     Current packs/day: 0.25     Average packs/day: 0.3 packs/day for 29.0 years (7.3 ttl pk-yrs)     Types: Cigarettes    Smokeless tobacco: Former    Tobacco comments:     tried it chewing tobacco but never continued use   Vaping Use    Vaping status: Never Used   Substance and Sexual Activity    Alcohol use: Yes     Comment: on special occasions    Drug use: Yes     Frequency: 7.0 times per week     Types: Marijuana, Methamphetamines     Comment: Marijuana daily, methamphetamine use once 3-4 weeks    Sexual activity: Not on file       CURRENT MEDICATIONS  Home Medications       Reviewed by Riccardo Welch R.N. (Registered Nurse) on 02/09/25 at 1550  Med List Status: Partial     Medication Last Dose Status   Alcohol Swabs  Active   aspirin (ASA) 81 MG Chew Tab chewable tablet  Active   atorvastatin (LIPITOR) 40 MG Tab  Active   Blood Glucose Meter Kit  Active   Blood Glucose Test Strips  Active   FARXIGA 10 MG Tab  Active   glucose blood (ONETOUCH VERIO) strip  Active   Insulin Glargine-yfgn 100 UNIT/ML Solution Pen-injector  Active   Insulin Pen Needle 32 G x 4 mm (BD PEN NEEDLE LAURA 2ND GEN)  Active   Lancets  Active   levothyroxine (SYNTHROID) 150 MCG Tab  Active   lisinopril (PRINIVIL) 30 MG tablet  Active   metformin (GLUCOPHAGE) 1000 MG tablet  Active   Nutritional Supplements (GLUCOSE MANAGEMENT) Tab  Active   omeprazole (PRILOSEC) 20 MG delayed-release capsule  Active                    ALLERGIES  Allergies   Allergen Reactions    No Known Drug Allergy        PHYSICAL EXAM  VITAL SIGNS: BP (!) 155/74   Pulse 86   Temp 36.7 °C (98.1 °F) (Temporal)   Resp 18   Ht 1.6 m (5' 3\")   Wt 116 kg (255 lb 4.7 oz)   LMP 03/30/2011   SpO2 95%   BMI 45.22 kg/m²    Constitutional: Awake and alert. Nontoxic  HENT: She has poor dentition.  There is tenderness to palpation of the left upper molar.  No facial swelling or erythema.  No obvious abscess.  No elevation of the tongue.  Trismus, " drooling or voice changes  Eyes: Grossly normal  Neck: Normal range of motion  Cardiovascular: Normal heart rate   Thorax & Lungs: No respiratory distress  Abdomen: Nontender  Back: She has lower sacral tenderness.  There is no overlying skin changes, no warmth, induration or erythema  Skin:  No pathologic rash.   Extremities: She has full strength to lower extremities.  Normal sensation to light touch.  Psychiatric: Affect normal      RADIOLOGY/PROCEDURES   I have independently interpreted the diagnostic imaging associated with this visit and am waiting the final reading from the radiologist.   My preliminary interpretation is as follows: No obvious fracture    Radiologist interpretation:  CT-PELVIS W/O   Final Result         1.  No acute abnormality.   2.  Lower lumbar degenerative changes.          COURSE & MEDICAL DECISION MAKING    ASSESSMENT, COURSE AND PLAN  Care Narrative: This is a 54-year-old who presents with left sided pelvic pain and concern for dental infection.  Patient arrives hypertensive, mildly tachycardic.     Patient has tenderness in the left lower sacral region.  She does not have any radicular symptoms.  There are no overlying skin changes to suggest cellulitis or abscess.  She is nontoxic, symptoms not rapidly progressive and I doubt a necrotizing skin infection or Cesar's gangrene.  She did recently have negative x-rays.  I have obtained a CT which is also unremarkable no acute findings other than some lower lumbar degenerative changes.  I recommend that she continue to use NSAIDs for pain.  She does have follow-up with her primary doctor and I advised close outpatient follow-up and return to the ER sooner if worsening    It also appears that she does have a dental infection.  She overall has poor dentition with dental caries.  There is no obvious evidence of abscess.  She has no trismus, voice changes or signs of airway involvement or deep space infection presentation not consistent  with Jimi angina.  I will treat with amoxicillin.    DISPOSITION AND DISCUSSIONS  I have discussed management of the patient with the following physicians and SANDRA's:  none    Discussion of management with other QHP or appropriate source(s): None     Escalation of care considered, and ultimately not performed:none    Barriers to care at this time, including but not limited to:  none .     Decision tools and prescription drugs considered including, but not limited to: Antibiotics prescribed for dental infection .    FINAL DIAGNOSIS  1. Dentalgia Acute   2. Sacral pain         Electronically signed by: Ronel Sparks M.D., 2/9/2025 4:28 PM

## 2025-02-10 NOTE — DISCHARGE INSTRUCTIONS
For pain, take Ibuprofen (Motrin, Advil) 600 mg up to every six hours  mg up to every eight hours if your stomach can take it. Instead of Ibuprofen, you can choose Naproxen (Aleve) and take 250-500 mg twice daily. Take Ibuprofen or Naproxen with food to lessen stomach irritation. You may also take Acetaminophen (Tylenol) 500mg (may take up to 1gm) every six hours in addition to Ibuprofen or Naproxen. (Maximum Tylenol 4 gm/day).  I suggest alternating between the ibuprofen and Tylenol every 4 hours for optimal pain relief and adequate spacing of each medication.

## 2025-02-24 ENCOUNTER — OFFICE VISIT (OUTPATIENT)
Dept: INTERNAL MEDICINE | Facility: OTHER | Age: 55
End: 2025-02-24
Payer: COMMERCIAL

## 2025-02-24 ENCOUNTER — HOSPITAL ENCOUNTER (OUTPATIENT)
Facility: MEDICAL CENTER | Age: 55
End: 2025-02-24
Payer: COMMERCIAL

## 2025-02-24 VITALS
HEIGHT: 62 IN | WEIGHT: 247.2 LBS | HEART RATE: 73 BPM | TEMPERATURE: 97 F | DIASTOLIC BLOOD PRESSURE: 95 MMHG | OXYGEN SATURATION: 96 % | SYSTOLIC BLOOD PRESSURE: 158 MMHG | BODY MASS INDEX: 45.49 KG/M2

## 2025-02-24 DIAGNOSIS — I63.9 CEREBROVASCULAR ACCIDENT (CVA), UNSPECIFIED MECHANISM (HCC): ICD-10-CM

## 2025-02-24 DIAGNOSIS — N18.2 CKD (CHRONIC KIDNEY DISEASE) STAGE 2, GFR 60-89 ML/MIN: ICD-10-CM

## 2025-02-24 DIAGNOSIS — E11.21 TYPE 2 DIABETES MELLITUS WITH NEPHROPATHY (HCC): ICD-10-CM

## 2025-02-24 DIAGNOSIS — M53.3 SACRAL PAIN: ICD-10-CM

## 2025-02-24 DIAGNOSIS — I10 PRIMARY HYPERTENSION: ICD-10-CM

## 2025-02-24 PROBLEM — K04.7 DENTAL INFECTION: Status: RESOLVED | Noted: 2023-10-19 | Resolved: 2025-02-24

## 2025-02-24 PROCEDURE — 81001 URINALYSIS AUTO W/SCOPE: CPT

## 2025-02-24 RX ORDER — NAPROXEN 500 MG/1
500 TABLET ORAL 2 TIMES DAILY PRN
Qty: 60 TABLET | Refills: 0 | Status: SHIPPED | OUTPATIENT
Start: 2025-02-24 | End: 2025-03-26

## 2025-02-24 RX ORDER — LISINOPRIL 40 MG/1
40 TABLET ORAL DAILY
Qty: 100 TABLET | Refills: 3 | Status: SHIPPED | OUTPATIENT
Start: 2025-02-24 | End: 2026-03-31

## 2025-02-24 RX ORDER — ASPIRIN 81 MG/1
81 TABLET, CHEWABLE ORAL DAILY
Qty: 90 TABLET | Refills: 1 | Status: SHIPPED | OUTPATIENT
Start: 2025-02-24

## 2025-02-24 RX ORDER — ATORVASTATIN CALCIUM 40 MG/1
40 TABLET, FILM COATED ORAL NIGHTLY
Qty: 90 TABLET | Refills: 1 | Status: SHIPPED | OUTPATIENT
Start: 2025-02-24

## 2025-02-24 NOTE — PATIENT INSTRUCTIONS
Please follow up on referrals for physical therapy and nephrology (kidney doctor). Thank you for visiting with me today in this clinic.

## 2025-02-25 PROBLEM — M53.3 SACRAL PAIN: Status: ACTIVE | Noted: 2025-02-25

## 2025-02-25 LAB
AMORPHOUS CRYSTALS 1764: PRESENT /HPF
APPEARANCE UR: ABNORMAL
BACTERIA #/AREA URNS HPF: ABNORMAL /HPF
BILIRUB UR QL STRIP.AUTO: NEGATIVE
CASTS URNS QL MICRO: ABNORMAL /LPF (ref 0–2)
COLOR UR: YELLOW
EPITHELIAL CELLS 1715: ABNORMAL /HPF (ref 0–5)
GLUCOSE UR STRIP.AUTO-MCNC: NEGATIVE MG/DL
KETONES UR STRIP.AUTO-MCNC: NEGATIVE MG/DL
LEUKOCYTE ESTERASE UR QL STRIP.AUTO: NEGATIVE
MICRO URNS: ABNORMAL
NITRITE UR QL STRIP.AUTO: NEGATIVE
PH UR STRIP.AUTO: 6 [PH] (ref 5–8)
PROT UR QL STRIP: 100 MG/DL
RBC # URNS HPF: ABNORMAL /HPF (ref 0–2)
RBC UR QL AUTO: NEGATIVE
SP GR UR STRIP.AUTO: 1.02
UROBILINOGEN UR STRIP.AUTO-MCNC: 0.2 EU/DL
WBC #/AREA URNS HPF: ABNORMAL /HPF

## 2025-02-25 ASSESSMENT — ENCOUNTER SYMPTOMS
SHORTNESS OF BREATH: 0
TINGLING: 0
CHILLS: 0
WEAKNESS: 0
FEVER: 0
SEIZURES: 0
VOMITING: 0
COUGH: 0
PALPITATIONS: 0

## 2025-02-25 NOTE — PROGRESS NOTES
ESTABLISHED PATIENT VISIT    PATIENT ID:  Name: Isa Espinoza    YOB: 1970  Patient Care Team:  Kris White M.D. as PCP - General (Internal Medicine)    CHIEF COMPLAINT(s):  [facility] (Pain in left gluteal region //Walking, standing or sitting still in pain/Pt seen in hospital for issue ~two weeks prior) and Medication Refill (atorvastatin (LIPITOR) 40 MG Tab/aspirin (ASA) 81 MG Chew Tab chewable tablet)    Follow up for Diagnoses of Primary hypertension, Sacral pain, CKD (chronic kidney disease) stage 2, GFR 60-89 ml/min, Cerebrovascular accident (CVA), unspecified mechanism ([facility]), and Type 2 diabetes mellitus with nephropathy ([facility]) were pertinent to this visit.    History of Present Illness:    This is a pleasant 54 y.o. female clinic patient established with Dr. White who presents today with complaint of left-sided sacral pain.  Patient reports experiencing pain over the last several months with no inciting events or trauma leading to pain.  Patient reports pain typically triggered after walking short distances or occasionally when sitting for long periods of time.  Patient reports sharp-burning pain over left sacral area without radiation.  Patient reports pain mildly improved when bending forward as well as improved with occasional use of ibuprofen.      Patient Active Problem List    Diagnosis Date Noted    Sacral pain 02/25/2025    Class 3 severe obesity due to excess calories with body mass index (BMI) of 40.0 to 44.9 in adult ([facility]) 01/28/2025    Helicobacter pylori infection 03/29/2024    Weight loss 03/29/2024    Impacted cerumen of right ear 03/29/2024    GERD (gastroesophageal reflux disease) 10/06/2023    Erythrocytosis 03/19/2023    Dyslipidemia 03/19/2023    Hot flashes 03/19/2023    CKD (chronic kidney disease) stage 2, GFR 60-89 ml/min 02/19/2023    History of CVA (cerebrovascular accident) 04/27/2022    Blurry vision, bilateral 04/19/2021     Substance abuse (HCC) 04/19/2021    Hypothyroidism 08/11/2020    Type 2 diabetes mellitus with diabetic microalbuminuria, with long-term current use of insulin (HCC) 08/11/2020    Dizziness 08/02/2018    Obesity with body mass index 30 or greater 07/13/2017    HTN (hypertension) 04/17/2015         Review of Systems   Constitutional:  Negative for chills, fever and malaise/fatigue.   Respiratory:  Negative for cough and shortness of breath.    Cardiovascular:  Negative for chest pain and palpitations.   Gastrointestinal:  Negative for vomiting.   Neurological:  Negative for tingling, seizures and weakness.       Past Medical History:   Diagnosis Date    Hypertension     Metabolic acidosis 03/19/2023    Non-adherence to medical treatment 04/17/2015    Thyroid disorder     Transaminitis 03/19/2023    Type II or unspecified type diabetes mellitus without mention of complication, not stated as uncontrolled      No past surgical history on file.  Family History   Problem Relation Age of Onset    Stroke Mother         Patient states taht mother was way older than when she or her sister had stroked    Cancer Mother         States that it was somewhere near stomach which had already spread and she went to hospice upon diagnosis    Stroke Father     Stroke Sister 45        Paralysed neck below    No Known Problems Paternal Aunt      No known drug allergy  Social History     Tobacco Use    Smoking status: Some Days     Current packs/day: 0.25     Average packs/day: 0.3 packs/day for 29.0 years (7.3 ttl pk-yrs)     Types: Cigarettes    Smokeless tobacco: Former    Tobacco comments:     tried it chewing tobacco but never continued use   Vaping Use    Vaping status: Never Used   Substance Use Topics    Alcohol use: Yes     Comment: on special occasions    Drug use: Yes     Frequency: 7.0 times per week     Types: Marijuana, Methamphetamines     Comment: Marijuana daily, methamphetamine use once 3-4 weeks     Current Outpatient  Medications   Medication Sig Dispense Refill    naproxen (NAPROSYN) 500 MG Tab Take 1 Tablet by mouth 2 times a day as needed (For back pain) for up to 30 days. 60 Tablet 0    aspirin (ASA) 81 MG Chew Tab chewable tablet Chew 1 Tablet every day. CHEW AND SWALLOW 90 Tablet 1    atorvastatin (LIPITOR) 40 MG Tab Take 1 Tablet by mouth every evening. 90 Tablet 1    lisinopril (PRINIVIL) 40 MG tablet Take 1 Tablet by mouth every day. 100 Tablet 3    Insulin Pen Needle 32 G x 4 mm (BD PEN NEEDLE LAURA 2ND GEN) USE TO INJECT INSULIN EVERY  Each 0    levothyroxine (SYNTHROID) 150 MCG Tab TAKE 1 TABLET BY MOUTH EVERY MORNING ON AN EMPTY STOMACH 30 Tablet 1    metformin (GLUCOPHAGE) 1000 MG tablet TAKE 1 TABLET BY MOUTH TWICE DAILY WITH MEALS 60 Tablet 1    glucose blood (ONETOUCH VERIO) strip TEST BLOOD SUGAR ONCE DAILY AND AS NEEDED FOR SYMPTOMS OF HIGH OR LOW BLOOD SUGAR 100 Strip 1    Insulin Glargine-yfgn 100 UNIT/ML Solution Pen-injector Inject 28 Units under the skin every evening. If not injecting 28 units at this time, then continue the current dose and titrate by 2 units every 2 days to maintain morning fasting sugar between 100-120. (Patient taking differently: Inject 34 Units under the skin every evening. If not injecting 28 units at this time, then continue the current dose and titrate by 2 units every 2 days to maintain morning fasting sugar between 100-120.) 30 mL 1    Lancets USE ONE LANCET TO TEST BLOOD SUGAR ONCE DAILY 100 Each 1    Alcohol Swabs Wipe site with prep pad prior to injection. 100 Each 1    Blood Glucose Test Strips Test strips order: Test strips for One Touch Verio meter. Sig: use once and prn ssx high or low sugar #100 RF x 3 100 Strip 3    Nutritional Supplements (GLUCOSE MANAGEMENT) Tab Take 4 Tablets by mouth as needed (for hypoglycemia). 15 Tablet 1    Blood Glucose Meter Kit Test blood sugar as recommended by provider. True Metrix blood glucose monitoring kit. 1 Kit 0    FARXIGA 10  "MG Tab Take 1 Tablet by mouth every day for 100 days. (Patient not taking: Reported on 2/24/2025) 100 Tablet 0    omeprazole (PRILOSEC) 20 MG delayed-release capsule TAKE 1 CAPSULE BY MOUTH EVERY DAY (Patient not taking: Reported on 2/24/2025) 90 Capsule 1     No current facility-administered medications for this visit.       OBJECTIVE:  BP (!) 158/95 (BP Location: Left arm, Patient Position: Sitting, BP Cuff Size: Adult)   Pulse 73   Temp 36.1 °C (97 °F) (Temporal)   Ht 1.575 m (5' 2\")   Wt 112 kg (247 lb 3.2 oz)   LMP 03/30/2011   SpO2 96%   BMI 45.21 kg/m²   Physical Exam  Vitals reviewed.   Constitutional:       General: She is not in acute distress.     Appearance: Normal appearance. She is not ill-appearing.   HENT:      Right Ear: External ear normal.      Left Ear: External ear normal.      Nose: Nose normal.      Mouth/Throat:      Mouth: Mucous membranes are moist.      Pharynx: Oropharynx is clear.   Cardiovascular:      Rate and Rhythm: Normal rate and regular rhythm.      Pulses: Normal pulses.      Heart sounds: Normal heart sounds.   Pulmonary:      Effort: Pulmonary effort is normal.      Breath sounds: Normal breath sounds.   Abdominal:      General: Bowel sounds are normal. There is no distension.      Palpations: Abdomen is soft.      Tenderness: There is no abdominal tenderness.   Musculoskeletal:      Cervical back: Normal.      Thoracic back: Normal.      Lumbar back: Normal.   Skin:     General: Skin is warm and dry.   Neurological:      General: No focal deficit present.      Mental Status: She is alert.   Psychiatric:         Mood and Affect: Mood normal.         Behavior: Behavior normal.         Thought Content: Thought content normal.         Judgment: Judgment normal.         ASSESSMENT AND PLAN:     Problem List Items Addressed This Visit       HTN (hypertension)    BP in clinic today 158/95.  Patient does not normally monitor blood pressure at home however still persistently " elevated values in clinic.  Patient currently managed on lisinopril 30 mg daily  - Increase lisinopril to 40 mg daily  - Encouraged patient to log blood pressure at home to review at next visit  - Reviewed alarm precautions with patient that require immediate evaluation  - Reviewed and counseled patient on proper dietary habits         Relevant Medications    atorvastatin (LIPITOR) 40 MG Tab    lisinopril (PRINIVIL) 40 MG tablet    CKD (chronic kidney disease) stage 2, GFR 60-89 ml/min    Patient with history of CKD stage II with previous referral placed to establish with nephrology.  However, for establishing requiring baseline labs including BMP or CMP, CBC, Urinalysis, Microalbumin creatinine ratio.  Patient has obtained most labs through [employer] which have been uploaded in chart  - Urine obtained in clinic for send out urinalysis  - Referral placed for patient to establish with nephrology as baseline labs have been completed, appreciate support         Relevant Orders    Referral to Nephrology    URINALYSIS (Completed)    Sacral pain    Patient reports a month history of left-sided cervical pain without triggering events.  Denies trauma.  Recently seen in ED on 02/09/2025 with CT scan completed significant only for lumbar degenerative changes.  - Trial patient on naproxen 500 mg twice daily for aid in pain control  - Referral already in place for patient to work with physical therapy initial appointment on 4/1/2025         Relevant Medications    naproxen (NAPROSYN) 500 MG Tab    aspirin (ASA) 81 MG Chew Tab chewable tablet     Other Visit Diagnoses         Cerebrovascular accident (CVA), unspecified mechanism (HCC)      -Refill provided for aspirin and atorvastatin for continued risk modification    Relevant Medications    aspirin (ASA) 81 MG Chew Tab chewable tablet    atorvastatin (LIPITOR) 40 MG Tab    lisinopril (PRINIVIL) 40 MG tablet      Type 2 diabetes mellitus with nephropathy (HCC)      -Refill provided  for atorvastatin for continued risk modification    Relevant Medications    atorvastatin (LIPITOR) 40 MG Tab            Orders Placed This Encounter    URINALYSIS    Referral to Nephrology    naproxen (NAPROSYN) 500 MG Tab    aspirin (ASA) 81 MG Chew Tab chewable tablet    atorvastatin (LIPITOR) 40 MG Tab    lisinopril (PRINIVIL) 40 MG tablet       Return in about 2 months (around 4/24/2025) for With Dr. White.    Patient discussed with Dr. Kailey Goldberg M.D.  R Internal Medicine Resident  PGY-1

## 2025-02-25 NOTE — ASSESSMENT & PLAN NOTE
BP in clinic today 158/95.  Patient does not normally monitor blood pressure at home however still persistently elevated values in clinic.  Patient currently managed on lisinopril 30 mg daily  - Increase lisinopril to 40 mg daily  - Encouraged patient to log blood pressure at home to review at next visit  - Reviewed alarm precautions with patient that require immediate evaluation  - Reviewed and counseled patient on proper dietary habits

## 2025-02-25 NOTE — ASSESSMENT & PLAN NOTE
Patient with history of CKD stage II with previous referral placed to establish with nephrology.  However, for establishing requiring baseline labs including BMP or CMP, CBC, Urinalysis, Microalbumin creatinine ratio.  Patient has obtained most labs through [employer] which have been uploaded in chart  - Urine obtained in clinic for send out urinalysis  - Referral placed for patient to establish with nephrology as baseline labs have been completed, appreciate support

## 2025-02-25 NOTE — ASSESSMENT & PLAN NOTE
Patient reports a month history of left-sided cervical pain without triggering events.  Denies trauma.  Recently seen in ED on 02/09/2025 with CT scan completed significant only for lumbar degenerative changes.  - Trial patient on naproxen 500 mg twice daily for aid in pain control  - Referral already in place for patient to work with physical therapy initial appointment on 4/1/2025

## 2025-02-25 NOTE — Clinical Note
REFERRAL APPROVAL NOTICE         Sent on February 25, 2025                   Isa Espinoza  [address]  [place] [address]                   Dear Ms. Espinoza,    After a careful review of the medical information and benefit coverage, [facility] has processed your referral. See below for additional details.    If applicable, you must be actively enrolled with your insurance for coverage of the authorized service. If you have any questions regarding your coverage, please contact your insurance directly.    REFERRAL INFORMATION   Referral #:  [id number]  Referred-To Department    Referred-By Provider:  Nephrology    Chad Goldberg M.D.   [facility]      [address] [facility] [address]  [phone] [address]  [facility] [address] [phone]    Referral Start Date:  02/24/2025  Referral End Date:   02/24/2026             SCHEDULING  If you do not already have an appointment, please call [phone] to make an appointment.     MORE INFORMATION  If you do not already have a [employer] account, sign up at: [email or web address]  You can access your medical information, make appointments, see lab results, billing information, and more.  If you have questions regarding this referral, please contact  the [facility] Referrals department at:             [phone]. Monday - Friday 8:00AM - 5:00PM.     Sincerely,    [facility]

## 2025-02-25 NOTE — ASSESSMENT & PLAN NOTE
Patient currently on metformin 1000 mg twice daily.  Has not started Farxiga as she has not picked up from pharmacy.  Not thoroughly addressed during visit.  - Refill provided for atorvastatin for risk stratification

## 2025-03-10 ENCOUNTER — APPOINTMENT (OUTPATIENT)
Dept: INTERNAL MEDICINE | Facility: OTHER | Age: 55
End: 2025-03-10
Payer: COMMERCIAL

## 2025-03-11 DIAGNOSIS — E03.9 HYPOTHYROIDISM, UNSPECIFIED TYPE: ICD-10-CM

## 2025-03-11 RX ORDER — LEVOTHYROXINE SODIUM 150 UG/1
150 TABLET ORAL
Qty: 30 TABLET | Refills: 1 | Status: SHIPPED | OUTPATIENT
Start: 2025-03-11

## 2025-03-11 NOTE — TELEPHONE ENCOUNTER
Cross covering physician note.  Chart reviewed, peers patient had suboptimally titrated TSH although was seen in the emergency department recently, question of whether this contributed, do see comment on most recent note about TSH although not part of management plan refill given to bridge to follow-up at current dosing, has appointment scheduled within 2 months with PCP.

## 2025-03-11 NOTE — TELEPHONE ENCOUNTER
Received request via: Pharmacy    Was the patient seen in the last year in this department? Yes    Does the patient have an active prescription (recently filled or refills available) for medication(s) requested? No    Pharm[facility] Name: [facility]    Does the patient have custodial Plus and need 100-day supply? (This applies to ALL medications) Patient does not have SCP

## 2025-04-24 ENCOUNTER — APPOINTMENT (OUTPATIENT)
Dept: PHYSICAL THERAPY | Facility: REHABILITATION | Age: 55
End: 2025-04-24
Attending: INTERNAL MEDICINE
Payer: COMMERCIAL

## 2025-04-24 NOTE — PROGRESS NOTES
No chief complaint on file.      HPI: Isa Espinoza is a 52 y.o. female with past medical history as below who presented to the clinic  for the following after a long gap in follow-up.        *low back pain.  -Reports midline pain radiating to both sides in the low back which is worse with standing straight not present on lying down.  Slightly better with sitting and standing on 1 leg at a time.  Denies any radiation to either leg  -Not associated with any numbness or tingling or weakness.  -Not associate with hip pain, groin pain.  -Denies having had any falls or lifted anything heavy unusually.  -On examination midline tenderness at the lower lumbar spine and paraspinal region including sacroiliac joint present.  -Has tried heat which apparently made it worse however is not sure if it was because it was too hot.  Has not tried lidocaine patch yet.  -Has not tried Tylenol or ibuprofen yet.  -Recommended x-ray given midline tenderness, x-ray showed moderate osteoarthritic changes at L4-L5 level physical therapy for which order has been placed.  -Tylenol as needed try to avoid ibuprofen given history of GERD.      *GERD.  -Patient requesting for refill of omeprazole which has been placed however this issue was not discussed in detail on this visit.    *Right hand tingling.  -Reports ongoing for the past 1 to 2 months.  -On the first and third digits.  -Does not feel objects normally as compared to the other hand.  -Has not dropped any objects.  -On examination Tinel and phallen sign positive.  -Discussed using a brace, reassess in 1 month when we see patient for        *Hypertension -  Has history of CVA.    Was Well managed with lisinopril 30 mg -   -Elevated today in clinic, patient reports that she randomly checks blood pressures at home and it has been less than 150/90, has seen numbers  as low as 120 systolic.   -Reports taking medication regularly not running out of it.  -Were encouraged to keep a log of  blood pressures and bring to 1 month follow-up visit  -Recreational substance use not discussed during this visit-something to bring up on the next visit as this could be a potential cause for hypertension        *Type DM   - Aic of 6.7 in jUly 2022 , was on metformin 500 SR, > march, 2023 was  8> September 2023> 14 > A1c y in jan 2024 7.4 > March 2024 - 6.2. 7.8 in jan 2025 Trulicity started on last visit   in jan  - taking metformin 2000 mg regularly- tolerating glucopghage well, patient is currently taking 34 units of insulin glargine at nighttime, [patient self titrated up from 28 units when we last saw each other to 34 units )  -Patient has not been checking blood sugars.  -Encouraged to check blood sugars at least fasting state and bring a log to the next visit so we can make adjustments along with blood work.  - microalbumin creatinine ratio from September 2023 - 85 >  jan 2025 : 79 elevated , bun/creatinine of 16/1.10, egfr 60  A referral to nephrology was placed by last provider who saw patient     On previous visit following more positive, these were not discussed on this visit.  - positive for nocturia, however has decreased in frequency compared to before, waking up 2-3 times compared to 5 times -  -not excessively hungry and not that thirsty as per patient - trying to cut down not yet called nutritionist as they had to cancel - patient will ask  regarding rescheduling    -Patient regularly sees optometrist, however with new onset blurry vision as described previously right worse than left - no flashes floaters- requested for follow up with eye doctor- this symptoms is better now as per patient -  -denies Any neuropathy, monofilament test in clinic  last visit  was negative.  -From sept 2023- albumin creatinine ratio showing proteinuria= 85, renal function 15/1.03 with egfr 63 of , with UA showing ketones, protein glucose, high specific gravity, squamous epithelial cell, hyaline cast and few  bacteria with no burning urgency, fevers chills, flank pain at this time     -Denies any constipation.      *Hypothyroidism   -TSH of 26 in 2015, restarted on levothyroxine 150 mcg. With intermittent non adherence, TSH wnl from September 2023 came back to normal . Recent labs TSH from Jan 2025 - 10.21, free t4 normal at 1.1    Weight has been up and down, currently down trending . States that her menstrual cycles are intermittently irregular                       Other problems not discussed on this visit  *Dizziness   -reports that she has had almost no episodes that were noticeable since last visit   -happens randomly even when sitting - for few seconds - patient has to distract herself - shake it off, take a walk and it goes away . . Feels like room spinning   Reports that her blood pressures and sugars have been normal during these episodes, however patient is unable to tell me the numbers.   Encouraged to check them and write them down   Neuro exam including cerebellar, romberg were all normal   Has not been drinking enough water   Ear exam - wax on right ear - feels clicking on that side - instructed to use debrox   Cerumen in ear   -Plan as above  #DLD   -ldl of 71 on jan 2025  at goal on atorvastatin 40 and lifestyle changes   ,   MRI does show chronic microvascular ischemic changes    *Feeling full in throat   -resolved - back on omeprazole.   #Hot flashes   -Not bothering her too much   She sleeps with her window open and carries around a fan everywhere.   #Erythrocytosis   -oxygen saturation of 97%.   -Unclear if patient has sleep apnea. Unsure if she snores.   STOP BANG : intermediate, neck circumference not measured. Patient has not had a chance to ask son regarding snoring   *Substance use disorder   -did have intermittent relapses however has been abstinent from meth since last visit and 1 month prior to that atleast as paer patient   -Dental procedure has not been scheduled yet- appointment in  december  -Currently smoking marijuana every other day  -States that she smokes a few cigarettes a month  -Social alcohol use.   -Patient had not presented to appointments for a few months after which she presents today.    *History of CVA with substance use at the time   -TIA like symptoms happened  - 2 years ago-in 2021?- when she had slurry speech, vision changes on one eye, was emotional, seen at [facility] with resolution of symptoms in a few hours with recommendation to follow up with [facility] clinic which she did not do at the time due to resolution of symptoms and then forgot about it. Patient was smoking cannabis and also methamphetamine at the time. Patient recently had an eye exam done at [facility] and was told that she has vision changes suggestive of a stroke for which an MRI brain was performed in August 2022 which confirmed the presence of a  old left temporal occipital stroke with chronic microvascular ischemic changes.     Patient currently states that she has worse vision on left eye, however doing well with glasses both for near and far sightedness ( 2 separate glasses) and still drives. No new vision changes.  Echo cardiogram in April 2021 showed trace mR, AI and Trace TR   *CKD stage 3a> CKD stage 2  As per most recent labs.   albumin creatinine ratio showing proteinuria= 85,. 79 ( jan 2025) renal function 15/1.03 with egfr 63>  microalbumin creatinine ratio from jan 2025 : 79 elevated , bun/creatinine of 16/1.10, egfr 60  , with UA showing ketones, protein glucose, high specific gravity, squamous epithelial cell, hyaline cast and few bacteria with no burning urgency, fevers chills, flank pain at this time         *Preventative health  -Patient reports that she has received her shingles vaccine  -She reports that she has an appointment for colonoscopy, still awaiting a callback from them to schedule an appointment she will be calling them since she has not heard back from them for 3  weeks.  Pap smear - April 2024 normal cytology with negative HPV                   Family History   Problem Relation Age of Onset    Stroke Mother         Patient states taht mother was way older than when she or her sister had stroked    Cancer Mother         States that it was somewhere near stomach which had already spread and she went to hospice upon diagnosis    Stroke Father     Stroke Sister 45        Paralysed neck below    No Known Problems Paternal Aunt       Social History     Socioeconomic History    Marital status:    Tobacco Use    Smoking status: Some Days     Current packs/day: 0.25     Average packs/day: 0.3 packs/day for 29.0 years (7.3 ttl pk-yrs)     Types: Cigarettes    Smokeless tobacco: Former    Tobacco comments:     tried it chewing tobacco but never continued use   Vaping Use    Vaping status: Never Used   Substance and Sexual Activity    Alcohol use: Yes     Comment: on special occasions    Drug use: Yes     Frequency: 7.0 times per week     Types: Marijuana, Methamphetamines     Comment: Marijuana daily, methamphetamine use once 3-4 weeks          Patient Active Problem List    Diagnosis Date Noted    Sacral pain 02/25/2025    Class 3 severe obesity due to excess calories with body mass index (BMI) of 40.0 to 44.9 in adult 01/28/2025    Helicobacter pylori infection 03/29/2024    Weight loss 03/29/2024    Impacted cerumen of right ear 03/29/2024    GERD (gastroesophageal reflux disease) 10/06/2023    Erythrocytosis 03/19/2023    Dyslipidemia 03/19/2023    Hot flashes 03/19/2023    CKD (chronic kidney disease) stage 2, GFR 60-89 ml/min 02/19/2023    History of CVA (cerebrovascular accident) 04/27/2022    Blurry vision, bilateral 04/19/2021    Substance abuse (HCC) 04/19/2021    Hypothyroidism 08/11/2020    Type 2 diabetes mellitus with diabetic microalbuminuria, with long-term current use of insulin (HCC) 08/11/2020    Dizziness 08/02/2018    Obesity with body mass index 30 or  greater 07/13/2017    HTN (hypertension) 04/17/2015       Current Outpatient Medications   Medication Sig Dispense Refill    levothyroxine (SYNTHROID) 150 MCG Tab TAKE 1 TABLET BY MOUTH EVERY MORNING ON AN EMPTY STOMACH 30 Tablet 1    aspirin (ASA) 81 MG Chew Tab chewable tablet Chew 1 Tablet every day. CHEW AND SWALLOW 90 Tablet 1    atorvastatin (LIPITOR) 40 MG Tab Take 1 Tablet by mouth every evening. 90 Tablet 1    lisinopril (PRINIVIL) 40 MG tablet Take 1 Tablet by mouth every day. 100 Tablet 3    FARXIGA 10 MG Tab Take 1 Tablet by mouth every day for 100 days. (Patient not taking: Reported on 2/24/2025) 100 Tablet 0    Insulin Pen Needle 32 G x 4 mm (BD PEN NEEDLE LAURA 2ND GEN) USE TO INJECT INSULIN EVERY  Each 0    omeprazole (PRILOSEC) 20 MG delayed-release capsule TAKE 1 CAPSULE BY MOUTH EVERY DAY (Patient not taking: Reported on 2/24/2025) 90 Capsule 1    metformin (GLUCOPHAGE) 1000 MG tablet TAKE 1 TABLET BY MOUTH TWICE DAILY WITH MEALS 60 Tablet 1    glucose blood (ONETOUCH VERIO) strip TEST BLOOD SUGAR ONCE DAILY AND AS NEEDED FOR SYMPTOMS OF HIGH OR LOW BLOOD SUGAR 100 Strip 1    Insulin Glargine-yfgn 100 UNIT/ML Solution Pen-injector Inject 28 Units under the skin every evening. If not injecting 28 units at this time, then continue the current dose and titrate by 2 units every 2 days to maintain morning fasting sugar between 100-120. (Patient taking differently: Inject 34 Units under the skin every evening. If not injecting 28 units at this time, then continue the current dose and titrate by 2 units every 2 days to maintain morning fasting sugar between 100-120.) 30 mL 1    Lancets USE ONE LANCET TO TEST BLOOD SUGAR ONCE DAILY 100 Each 1    Alcohol Swabs Wipe site with prep pad prior to injection. 100 Each 1    Blood Glucose Test Strips Test strips order: Test strips for One Touch Verio meter. Sig: use once and prn ssx high or low sugar #100 RF x 3 100 Strip 3    Nutritional Supplements  (GLUCOSE MANAGEMENT) Tab Take 4 Tablets by mouth as needed (for hypoglycemia). 15 Tablet 1    Blood Glucose Meter Kit Test blood sugar as recommended by provider. True Metrix blood glucose monitoring kit. 1 Kit 0     No current facility-administered medications for this visit.       ROS: As per HPI. Additional pertinent systems as noted below.  Constitutional:  Negative for fever, chills   Cardiovascular:   denies any  palpitations at this time  Respiratory:  Negative for cough, sputum production,   Gastrointestinal: Negative for abdominal pain, nausea, vomiting, , or blood in stools . Positive as in hpi- constipation better   Genitourinary: Negative for dysuria, flank pain and hematuria. Positive for nocturia  Extremities: Negative for leg swelling   Neurologic: Negative for headaches, , seizures, weakness . Endo/Heme/Allergies: Positive for  nocturia.  Denies any polyphagia    LMP 03/30/2011     Physical Exam   Constitutional:  Well developed, well nourished. Not in acute distress   Cardiovascular:  Regular rate and rhythm, No pedal edema, Intact distal pulses   Respiratory: Clear to auscultation bilaterally, No use of accessory muscles    Neurologic: Alert & oriented x 4, no cerebellar signs, normal romberg    Note: I have reviewed all pertinent labs and diagnostic tests associated with this visit with specific comments listed under the assessment and plan below    Assessment and Plan    1. Sacroiliitis (HCC)  -Has tried heat which apparently made it worse however is not sure if it was because it was too hot.  Has not tried lidocaine patch yet.  -Has not tried Tylenol or ibuprofen yet.  -Recommended x-ray given midline tenderness, physical therapy for which order has been placed.  -Tylenol as needed try to avoid ibuprofen given history of GERD.  - Referral to Physical Therapy  - DX-SACRUM AND COCCYX 2+; Future    2. Acute bilateral low back pain without sciatica  -Has tried heat which apparently made it worse  however is not sure if it was because it was too hot.  Has not tried lidocaine patch yet.  -Has not tried Tylenol or ibuprofen yet.  -Recommended x-ray given midline tenderness, physical therapy for which order has been placed.  -Tylenol as needed try to avoid ibuprofen given history of GERD.  - Referral to Physical Therapy    3. Acute midline low back pain without sciatica  -Has tried heat which apparently made it worse however is not sure if it was because it was too hot.  Has not tried lidocaine patch yet.  -Has not tried Tylenol or ibuprofen yet.  -Recommended x-ray given midline tenderness, physical therapy for which order has been placed.  -Tylenol as needed try to avoid ibuprofen given history of GERD.  - DX-LUMBAR SPINE-2 OR 3 VIEWS; Future    4. Gastroesophageal reflux disease without esophagitis  -Patient requesting for refill of omeprazole which has been placed however this issue was not discussed in detail on this visit.  - omeprazole (PRILOSEC) 20 MG delayed-release capsule; Take 1 Capsule by mouth every day.  Dispense: 30 Capsule; Refill: 1  - Lipid Profile; Future    5. Type 2 diabetes mellitus with nephropathy (HCC)  - Aic of 6.7 in jUly 2022 , was on metformin 500 SR, > march, 2023 was  8> September 2023> 14 > A1c y in jan 2024 7.4 > March 2024 - 6.2  - taking metformin 2000 mg regularly- tolerating glucopghage well, patient is currently taking 34 units of insulin glargine at nighttime, [patient self titrated up from 28 units when we last saw each other to 34 units )  -Patient has not been checking blood sugars.  -Encouraged to check blood sugars at least fasting state and bring a log to the next visit so we can make adjustments along with blood work.  -Repeat microalbumin creatinine ratio was ordered    - HEMOGLOBIN A1C; Future  - MICROALBUMIN CREAT RATIO URINE; Future    6. History of CVA (cerebrovascular accident)    - Comp Metabolic Panel; Future    7. Primary hypertension   Has history of CVA.     Was Well managed with lisinopril 30 mg -   -Elevated today in clinic, patient reports that she randomly checks blood pressures at home and it has been less than 150/90, has seen numbers  as low as 120 systolic.   -Reports taking medication regularly not running out of it.  -Were encouraged to keep a log of blood pressures and bring to 1 month follow-up visit  -Recreational substance use not discussed during this visit-something to bring up on the next visit as this could be a potential cause for hypertension  - Comp Metabolic Panel; Future    8. Dyslipidemia    - Lipid Profile; Future    9. Hypothyroidism, unspecified type    - TSH WITH REFLEX TO FT4; Future    10. Erythrocytosis    - CBC WITHOUT DIFFERENTIAL; Future       Followup: No follow-ups on file.        Signed by: Kris White M.D.

## 2025-04-25 ENCOUNTER — APPOINTMENT (OUTPATIENT)
Dept: INTERNAL MEDICINE | Facility: OTHER | Age: 55
End: 2025-04-25
Payer: COMMERCIAL

## 2025-05-02 ENCOUNTER — APPOINTMENT (OUTPATIENT)
Dept: PHYSICAL THERAPY | Facility: REHABILITATION | Age: 55
End: 2025-05-02
Attending: INTERNAL MEDICINE
Payer: COMMERCIAL

## 2025-05-06 ENCOUNTER — APPOINTMENT (OUTPATIENT)
Dept: PHYSICAL THERAPY | Facility: REHABILITATION | Age: 55
End: 2025-05-06
Attending: INTERNAL MEDICINE
Payer: COMMERCIAL

## 2025-05-11 DIAGNOSIS — E11.21 TYPE 2 DIABETES MELLITUS WITH NEPHROPATHY (HCC): ICD-10-CM

## 2025-05-12 NOTE — TELEPHONE ENCOUNTER
Received request via: Pharmacy    Was the patient seen in the last year in this department? Yes    Does the patient have an active prescription (recently filled or refills available) for medication(s) requested? No    Pharmacy Name: [facility] - [id number]    Does the patient have [facility] Plus and need 100-day supply? (This applies to ALL medications) Patient does not have SCP

## 2025-05-13 ENCOUNTER — TELEPHONE (OUTPATIENT)
Dept: INTERNAL MEDICINE | Facility: OTHER | Age: 55
End: 2025-05-13
Payer: COMMERCIAL

## 2025-05-13 ENCOUNTER — APPOINTMENT (OUTPATIENT)
Dept: PHYSICAL THERAPY | Facility: REHABILITATION | Age: 55
End: 2025-05-13
Attending: INTERNAL MEDICINE
Payer: COMMERCIAL

## 2025-05-13 DIAGNOSIS — I63.9 CEREBROVASCULAR ACCIDENT (CVA), UNSPECIFIED MECHANISM (HCC): ICD-10-CM

## 2025-05-13 DIAGNOSIS — K21.9 GASTROESOPHAGEAL REFLUX DISEASE WITHOUT ESOPHAGITIS: ICD-10-CM

## 2025-05-13 DIAGNOSIS — E11.21 TYPE 2 DIABETES MELLITUS WITH NEPHROPATHY (HCC): ICD-10-CM

## 2025-05-13 RX ORDER — ASPIRIN 81 MG/1
81 TABLET, CHEWABLE ORAL DAILY
Qty: 90 TABLET | Refills: 1 | Status: SHIPPED | OUTPATIENT
Start: 2025-05-13

## 2025-05-13 RX ORDER — INSULIN GLARGINE-YFGN 100 [IU]/ML
34 INJECTION, SOLUTION SUBCUTANEOUS EVERY EVENING
Qty: 32 ML | Refills: 1 | Status: SHIPPED | OUTPATIENT
Start: 2025-05-13

## 2025-05-13 RX ORDER — OMEPRAZOLE 20 MG/1
20 CAPSULE, DELAYED RELEASE ORAL DAILY
Qty: 90 CAPSULE | Refills: 1 | Status: SHIPPED | OUTPATIENT
Start: 2025-05-13

## 2025-05-13 NOTE — TELEPHONE ENCOUNTER
Was trying to call patient's son, patient picked up the call requested for refills on insulin aspirin and omeprazole which has been placed today.  - Patient will be seeing me on June 4

## 2025-05-20 ENCOUNTER — APPOINTMENT (OUTPATIENT)
Dept: PHYSICAL THERAPY | Facility: REHABILITATION | Age: 55
End: 2025-05-20
Attending: INTERNAL MEDICINE
Payer: COMMERCIAL

## 2025-05-27 ENCOUNTER — APPOINTMENT (OUTPATIENT)
Dept: PHYSICAL THERAPY | Facility: REHABILITATION | Age: 55
End: 2025-05-27
Attending: INTERNAL MEDICINE
Payer: COMMERCIAL

## 2025-05-28 ENCOUNTER — OFFICE VISIT (OUTPATIENT)
Dept: NEPHROLOGY | Facility: MEDICAL CENTER | Age: 55
End: 2025-05-28
Payer: COMMERCIAL

## 2025-05-28 VITALS
DIASTOLIC BLOOD PRESSURE: 64 MMHG | BODY MASS INDEX: 44.59 KG/M2 | SYSTOLIC BLOOD PRESSURE: 112 MMHG | HEART RATE: 80 BPM | TEMPERATURE: 96.6 F | WEIGHT: 242.3 LBS | HEIGHT: 62 IN | OXYGEN SATURATION: 96 %

## 2025-05-28 DIAGNOSIS — R80.9 TYPE 2 DIABETES MELLITUS WITH DIABETIC MICROALBUMINURIA, WITH LONG-TERM CURRENT USE OF INSULIN (HCC): ICD-10-CM

## 2025-05-28 DIAGNOSIS — E03.9 HYPOTHYROIDISM, UNSPECIFIED TYPE: ICD-10-CM

## 2025-05-28 DIAGNOSIS — N18.9 CHRONIC KIDNEY DISEASE, UNSPECIFIED CKD STAGE: Primary | ICD-10-CM

## 2025-05-28 DIAGNOSIS — Z79.4 TYPE 2 DIABETES MELLITUS WITH DIABETIC MICROALBUMINURIA, WITH LONG-TERM CURRENT USE OF INSULIN (HCC): ICD-10-CM

## 2025-05-28 DIAGNOSIS — E11.29 TYPE 2 DIABETES MELLITUS WITH DIABETIC MICROALBUMINURIA, WITH LONG-TERM CURRENT USE OF INSULIN (HCC): ICD-10-CM

## 2025-05-28 DIAGNOSIS — I10 PRIMARY HYPERTENSION: ICD-10-CM

## 2025-05-28 ASSESSMENT — ENCOUNTER SYMPTOMS
CHILLS: 0
FEVER: 0
HYPERTENSION: 1
SHORTNESS OF BREATH: 0
COUGH: 0
NAUSEA: 0
VOMITING: 0

## 2025-05-28 NOTE — PROGRESS NOTES
"Subjective     Isa Espinoza is a 54 y.o. female who presents with Chronic Kidney Disease and Hypertension            The patient has a history of longstanding diabetes  She also has a history of meth abuse however she said she stopped a week ago  She was diagnosed with chronic kidney disease(undetermined stage) and was referred to us  Patient has no recent labs  No recent use of NSAIDs or IV contrast exposure.    Chronic Kidney Disease  This is a chronic problem. The current episode started more than 1 year ago. The problem occurs constantly. Pertinent negatives include no chest pain, chills, coughing, fever, nausea, urinary symptoms or vomiting.   Hypertension  This is a chronic problem. The current episode started more than 1 year ago. The problem is unchanged. The problem is controlled. Pertinent negatives include no chest pain, malaise/fatigue, peripheral edema or shortness of breath. Risk factors for coronary artery disease include diabetes mellitus and obesity. Past treatments include ACE inhibitors. The current treatment provides significant improvement. There are no compliance problems.  Hypertensive end-organ damage includes kidney disease. Identifiable causes of hypertension include chronic renal disease.       Review of Systems   Constitutional:  Negative for chills, fever and malaise/fatigue.   Respiratory:  Negative for cough and shortness of breath.    Cardiovascular:  Negative for chest pain and leg swelling.   Gastrointestinal:  Negative for nausea and vomiting.   Genitourinary:  Negative for dysuria, frequency and urgency.              Objective     /64 (BP Location: Left arm, Patient Position: Sitting, BP Cuff Size: Adult)   Pulse 80   Temp 35.9 °C (96.6 °F) (Temporal)   Ht 1.575 m (5' 2\")   Wt 110 kg (242 lb 4.8 oz)   LMP 03/30/2011   SpO2 96%   BMI 44.32 kg/m²      Physical Exam  Vitals and nursing note reviewed.   Constitutional:       General: She is awake. She is not in acute " distress.     Appearance: She is well-developed. She is not ill-appearing or diaphoretic.   HENT:      Head: Normocephalic and atraumatic.      Right Ear: External ear normal.      Left Ear: External ear normal.      Nose: Nose normal. No rhinorrhea.      Mouth/Throat:      Pharynx: No oropharyngeal exudate or posterior oropharyngeal erythema.   Eyes:      General: No scleral icterus.        Right eye: No discharge.         Left eye: No discharge.      Conjunctiva/sclera: Conjunctivae normal.   Neck:      Vascular: No carotid bruit.   Cardiovascular:      Rate and Rhythm: Normal rate and regular rhythm.      Heart sounds: No murmur heard.  Pulmonary:      Effort: Pulmonary effort is normal. No respiratory distress.      Breath sounds: Normal breath sounds.   Abdominal:      General: Abdomen is flat. There is no distension.      Palpations: Abdomen is soft. There is no mass.   Musculoskeletal:         General: No tenderness.      Cervical back: No rigidity. No muscular tenderness.      Right lower leg: No edema.      Left lower leg: No edema.   Skin:     General: Skin is warm and dry.      Coloration: Skin is not jaundiced.   Neurological:      General: No focal deficit present.      Mental Status: She is alert and oriented to person, place, and time. Mental status is at baseline.   Psychiatric:         Mood and Affect: Mood normal.         Behavior: Behavior normal.         Thought Content: Thought content normal.     Past Medical History[1]  Social History     Socioeconomic History    Marital status:      Spouse name: Not on file    Number of children: Not on file    Years of education: Not on file    Highest education level: Not on file   Occupational History    Not on file   Tobacco Use    Smoking status: Some Days     Current packs/day: 0.25     Average packs/day: 0.3 packs/day for 29.0 years (7.3 ttl pk-yrs)     Types: Cigarettes    Smokeless tobacco: Former    Tobacco comments:     tried it chewing  "tobacco but never continued use   Vaping Use    Vaping status: Never Used   Substance and Sexual Activity    Alcohol use: Yes     Comment: on special occasions    Drug use: Yes     Frequency: 7.0 times per week     Types: Marijuana, Methamphetamines     Comment: Marijuana daily, methamphetamine use once 3-4 weeks    Sexual activity: Not on file   Other Topics Concern    Not on file   Social History Narrative    Not on file     Social Drivers of Health     Financial Resource Strain: Not on file   Food Insecurity: Not on file   Transportation Needs: Not on file   Physical Activity: Not on file   Stress: Not on file   Social Connections: Not on file   Intimate Partner Violence: Not on file   Housing Stability: Not on file     Family History   Problem Relation Age of Onset    Stroke Mother         Patient states taht mother was way older than when she or her sister had stroked    Cancer Mother         States that it was somewhere near stomach which had already spread and she went to hospice upon diagnosis    Stroke Father     Stroke Sister 45        Paralysed neck below    No Known Problems Paternal Aunt      No results for input(s): \"HGB\", \"ALBUMIN\", \"HDL\", \"TRIGLYCERIDE\", \"SODIUM\", \"POTASSIUM\", \"CHLORIDE\", \"CO2\", \"BUN\", \"CREATININE\", \"PHOSPHORUS\" in the last 8544 hours.    Invalid input(s): \"CHOLESTEROL\", \"LDL CLACULATED\", \"URIC ACID\", \"INTACT PTH\", \"PRO CREAT RATIO\"    Recent creatinine from March 27, 2021 was 1.23 mg/dL                           Assessment & Plan  Chronic kidney disease, unspecified CKD stage  The etiology is most likely diabetic nephropathy  No uremic symptoms  Renal dose of medication  Avoid nephrotoxins  Continue same medication regimen  Patient was advised to call us if symptoms worsen  Check labs to evaluate kidney function  Continue ACE inhibitor  Recommend SGLT2 inhibitor if significant proteinuria  Orders:    Basic Metabolic Panel; Future    CBC WITHOUT DIFFERENTIAL; Future    MICROALB/CREAT " RATIO RAND. UR    Primary hypertension  Controlled  Continue same medication regimen  Continue low-sodium diet    Orders:    Basic Metabolic Panel; Future    CBC WITHOUT DIFFERENTIAL; Future    MICROALB/CREAT RATIO RAND. UR    Type 2 diabetes mellitus with diabetic microalbuminuria, with long-term current use of insulin (HCC)  Optimize diabetes control for hemoglobin A1c below 7%  Orders:    Basic Metabolic Panel; Future    CBC WITHOUT DIFFERENTIAL; Future    MICROALB/CREAT RATIO RAND. UR    Hypothyroidism, unspecified type                           [1]   Past Medical History:  Diagnosis Date    Hypertension     Metabolic acidosis 03/19/2023    Non-adherence to medical treatment 04/17/2015    Thyroid disorder     Transaminitis 03/19/2023    Type II or unspecified type diabetes mellitus without mention of complication, not stated as uncontrolled

## 2025-06-03 ENCOUNTER — APPOINTMENT (OUTPATIENT)
Dept: PHYSICAL THERAPY | Facility: REHABILITATION | Age: 55
End: 2025-06-03
Attending: INTERNAL MEDICINE
Payer: COMMERCIAL

## 2025-06-04 ENCOUNTER — APPOINTMENT (OUTPATIENT)
Dept: INTERNAL MEDICINE | Facility: OTHER | Age: 55
End: 2025-06-04
Payer: COMMERCIAL

## 2025-06-10 ENCOUNTER — APPOINTMENT (OUTPATIENT)
Dept: PHYSICAL THERAPY | Facility: REHABILITATION | Age: 55
End: 2025-06-10
Attending: INTERNAL MEDICINE
Payer: COMMERCIAL

## 2025-06-17 ENCOUNTER — APPOINTMENT (OUTPATIENT)
Dept: PHYSICAL THERAPY | Facility: REHABILITATION | Age: 55
End: 2025-06-17
Attending: INTERNAL MEDICINE
Payer: COMMERCIAL

## 2025-07-02 NOTE — PROGRESS NOTES
"        Chief Complaint   Patient presents with    Follow-Up    Diabetes     Requesting one touch machine       HPI: Isa Espinoza is a 52 y.o. female with past medical history as below who presented to the clinic  for the following after a long gap in follow-up.     *Type DM   *Hyperglycemia   -with symptoms   -> 600 on poc today   Aic > 15   Patient reports increased thirst and polyuria   -Blurring of vision and tingling in median nerve distribution of right arm only fingers.  Denies any dizziness /lightheadedness, chest pain ,  palpitations  . Does feel  shortness of breath that she feel its because \" I am fat \",  -Patient denies any nausea, abdominal pain vomiting at this time.   Admits to non adherence to insulin   - Aic of 6.7 in jUly 2022 , was on metformin 500 SR, > march, 2023 was  8> September 2023> 14 > A1c y in jan 2024 7.4 > March 2024 - 6.2, last A1c from January 2025 was 7.8> now> 15   - taking metformin 2000 mg - NOT regularly due to family situation   - tolerating glucopghage well, patient is currently taking 44 units of insulin glargine at nighttime, [patient self titrated up from 28 units when we last saw each other to 34 units )> 44 units but not taking regularly - missed yesterdays dose   -also reports that the pharmacy was short on pen needles and because of that she missed some doses   -Patient has not been checking blood sugars- as she reports having lost her meter - requesting for prescription .  -Encouraged to check blood sugars at least fasting state and bring a log to the next visit so we can make adjustments along with blood work.- follow up in 2 weeks   -From sept 2023- albumin creatinine ratio showing proteinuria= 85, renal function 15/1.03 with egfr 63 of , with UA showing ketones, protein glucose, high specific gravity, squamous epithelial cell, hyaline cast and few bacteria with no burning urgency, fevers chills, flank pain at this time > 79 repeat albumin creat from jan 2025, " BUN/creatinine of 16/1.10.  Potassium of 4  -Denies any constipation.  -Starting farxiga today - previously insurance denied- patient was referred to nephrologist on the last visit by other provider - patient has seen them since no changes Microalbumin  -Patient was seeing optometrist - curtisver she was told that she needed to control her sugars before they could do anything and hence she does not want to do go see then until then as per their instructions.      *Polysubstance use   -Patient reports that she has not taken methamphetamine or cigarettes for the past 1 and half months.  - Patient is currently smoking marijuana.  - Currently still living in an environment where people are using these substances however patient has been able to stay away from them.    *low back pain.  -Reports midline pain radiating to both sides in the low back, more on the left butt cheek area as per patient.  Which is worse with standing straight not present on lying down.  Slightly better with sitting and standing on 1 leg at a time.   -Not associated with any numbness or tingling or weakness.  -Not associate with hip pain, groin pain.  -Denies having had any falls or lifted anything heavy unusually.  -The symptoms were addressed on the last visit since then the symptoms have been intermittent and no particular triggers that patient has been able to identify.  - She takes Tylenol.  - Recommended to use as needed ibuprofen if her GERD is getting worse and to stop using it.    *muscle spasms   every night for last 2 week s however she is tries to stretch.  -She feels this gets  worse with cold   - She is staying well-hydrated however sugars are extremely high.  - Likely secondary to electrolyte fluctuations from high sugars.  Patient encouraged to stay hydrated and management of sugars as above.      *Hypothyroidism   -TSH of 26 in 2015, restarted on levothyroxine 150 mcg. With intermittent non adherence,   TSH wnl from September 2023>  from jan 2025 10.21, with free T4 normal at 1.1. no changes in dose were made by provider on last visit - patient has gained weight - will check tsh again                                   Other problems not discussed on this visit    #DLD   -ldl of 69 as per most recent labs in September 2023- at goal on atorvastatin 40 and lifestyle changes   ,   MRI does show chronic microvascular ischemic changes    Labs lipid profile from January 2025 showed total cholesterol/HDL/triglycerides/LDL of 142/54/87/71  *GERD.  -Patient requesting for refill of omeprazole which has been placed however this issue was not discussed in detail on this visit.  *Right hand tingling.  -Reports ongoing for the past 1 to 2 months.  -On the first and third digits.  -Does not feel objects normally as compared to the other hand.  -Has not dropped any objects.  -On examination Tinel and phallen sign positive.  -Discussed using a brace, reassess in 1 month when we see patient for  *Hypertension -  Has history of CVA.    Was Well managed with lisinopril 30 mg -   -Elevated today in clinic, patient reports that she randomly checks blood pressures at home and it has been less than 150/90, has seen numbers  as low as 120 systolic.   -Reports taking medication regularly not running out of it.  -Were encouraged to keep a log of blood pressures and bring to 1 month follow-up visit  -Recreational substance use not discussed during this visit-something to bring up on the next visit as this could be a potential cause for hypertension  *Dizziness - none reported on visit today - description from previous visit as below   -reports that she has had almost no episodes that were noticeable since last visit   -happens randomly even when sitting - for few seconds - patient has to distract herself - shake it off, take a walk and it goes away . . Feels like room spinning   Reports that her blood pressures and sugars have been normal during these episodes, however  patient is unable to tell me the numbers.   Encouraged to check them and write them down   Neuro exam including cerebellar, romberg were all normal   Has not been drinking enough water   Ear exam - wax on right ear - feels clicking on that side - instructed to use debrox   Cerumen in ear   -Plan as above  *Feeling full in throat   -resolved - back on omeprazole.   #Hot flashes   -Not bothering her too much   She sleeps with her window open and carries around a fan everywhere.   #Erythrocytosis   -oxygen saturation of 97%.   -Unclear if patient has sleep apnea. Unsure if she snores.   STOP BANG : intermediate, neck circumference not measured. Patient has not had a chance to ask son regarding snoring   *Substance use disorder   -did have intermittent relapses however has been abstinent from meth since last visit and 1 month prior to that atleast as paer patient   -Dental procedure has not been scheduled yet- appointment in december  -Currently smoking marijuana every other day  -States that she smokes a few cigarettes a month  -Social alcohol use.   -Patient had not presented to appointments for a few months after which she presents today.  *History of CVA with substance use at the time   -TIA like symptoms happened  - 2 years ago-in 2021?- when she had slurry speech, vision changes on one eye, was emotional, seen at [facility] with resolution of symptoms in a few hours with recommendation to follow up with [facility] clinic which she did not do at the time due to resolution of symptoms and then forgot about it. Patient was smoking cannabis and also methamphetamine at the time. Patient recently had an eye exam done at [facility] and was told that she has vision changes suggestive of a stroke for which an MRI brain was performed in August 2022 which confirmed the presence of a  old left temporal occipital stroke with chronic microvascular ischemic changes.     Patient currently states that she has worse vision on left  eye, however doing well with glasses both for near and far sightedness ( 2 separate glasses) and still drives. No new vision changes.  Echo cardiogram in April 2021 showed trace mR, AI and Trace TR   *CKD stage 3a> CKD stage 2  As per most recent labs.   albumin creatinine ratio showing proteinuria= 85, renal function 15/1.03 with egfr 63 of , with UA showing ketones, protein glucose, high specific gravity, squamous epithelial cell, hyaline cast and few bacteria with no burning urgency, fevers chills, flank pain at this time         *Preventative health  -Patient reports that she has received her shingles vaccine  -She reports that she has an appointment for colonoscopy, still awaiting a callback from them to schedule an appointment she will be calling them since she has not heard back from them for 3 weeks.                   Family History   Problem Relation Age of Onset    Stroke Mother         Patient states taht mother was way older than when she or her sister had stroked    Cancer Mother         States that it was somewhere near stomach which had already spread and she went to hospice upon diagnosis    Stroke Father     Stroke Sister 45        Paralysed neck below    No Known Problems Paternal Aunt       Social History     Socioeconomic History    Marital status:    Tobacco Use    Smoking status: Former     Current packs/day: 0.25     Average packs/day: 0.3 packs/day for 29.0 years (7.3 ttl pk-yrs)     Types: Cigarettes    Smokeless tobacco: Former    Tobacco comments:     tried it chewing tobacco but never continued use   Vaping Use    Vaping status: Never Used   Substance and Sexual Activity    Alcohol use: Yes     Comment: on special occasions    Drug use: Yes     Frequency: 7.0 times per week     Types: Marijuana, Methamphetamines     Comment: Marijuana daily, discontinued methamphetamine use          Patient Active Problem List    Diagnosis Date Noted    Hyperglycemia 07/03/2025    Right carpal  tunnel syndrome 07/03/2025    Poor compliance with medication 07/03/2025    Sacral pain 02/25/2025    Class 3 severe obesity due to excess calories with body mass index (BMI) of 40.0 to 44.9 in adult 01/28/2025    Helicobacter pylori infection 03/29/2024    Impacted cerumen of right ear 03/29/2024    GERD (gastroesophageal reflux disease) 10/06/2023    Erythrocytosis 03/19/2023    Dyslipidemia 03/19/2023    Hot flashes 03/19/2023    CKD (chronic kidney disease) stage 2, GFR 60-89 ml/min 02/19/2023    History of CVA (cerebrovascular accident) 04/27/2022    Blurry vision, bilateral 04/19/2021    Substance abuse (HCC) 04/19/2021    Hypothyroidism 08/11/2020    Type 2 diabetes mellitus with nephropathy (HCC) 08/11/2020    Dizziness 08/02/2018    Obesity with body mass index 30 or greater 07/13/2017    HTN (hypertension) 04/17/2015       Current Outpatient Medications   Medication Sig Dispense Refill    Insulin Pen Needle 32 G x 4 mm (BD PEN NEEDLE LAURA 2ND GEN) USE TO INJECT INSULIN EVERY  Each 1    Blood Glucose Test Strips Test strips order: Test strips for One Touch Verio meter. Sig: use once and prn ssx high or low sugar #100 RF x 3 100 Strip 3    Blood Glucose Meter Kit Test blood sugar as recommended by provider. True Metrix blood glucose monitoring kit. 1 Kit 0    Lancets USE ONE LANCET TO TEST BLOOD SUGAR ONCE DAILY 100 Each 1    Alcohol Swabs Wipe site with prep pad prior to injection. 100 Each 1    dapagliflozin propanediol (FARXIGA) 10 MG Tab Take 1 Tablet by mouth every day. 90 Tablet 1    aspirin (ASA) 81 MG Chew Tab chewable tablet Chew 1 Tablet every day. CHEW AND SWALLOW 90 Tablet 1    Insulin Glargine-yfgn 100 UNIT/ML Solution Pen-injector Inject 34 Units under the skin every evening. If not injecting 28 units at this time, then continue the current dose and titrate by 2 units every 2 days to maintain morning fasting sugar between 100-120. 32 mL 1    metformin (GLUCOPHAGE) 1000 MG tablet TAKE 1  "TABLET BY MOUTH TWICE DAILY WITH MEALS 180 Tablet 1    levothyroxine (SYNTHROID) 150 MCG Tab TAKE 1 TABLET BY MOUTH EVERY MORNING ON AN EMPTY STOMACH 30 Tablet 1    atorvastatin (LIPITOR) 40 MG Tab Take 1 Tablet by mouth every evening. 90 Tablet 1    lisinopril (PRINIVIL) 40 MG tablet Take 1 Tablet by mouth every day. 100 Tablet 3    glucose blood (ONETOUCH VERIO) strip TEST BLOOD SUGAR ONCE DAILY AND AS NEEDED FOR SYMPTOMS OF HIGH OR LOW BLOOD SUGAR 100 Strip 1    omeprazole (PRILOSEC) 20 MG delayed-release capsule Take 1 Capsule by mouth every day. (Patient not taking: Reported on 7/3/2025) 90 Capsule 1    Nutritional Supplements (GLUCOSE MANAGEMENT) Tab Take 4 Tablets by mouth as needed (for hypoglycemia). (Patient not taking: Reported on 7/3/2025) 15 Tablet 1     No current facility-administered medications for this visit.       ROS: As per HPI. Additional pertinent systems as noted below.  Constitutional:  Negative for fever, chills   Cardiovascular:   denies any  palpitations at this time  Respiratory:  Negative for cough, sputum production, . Positive as in hpi   Gastrointestinal: Negative for abdominal pain, nausea, vomiting, , or blood in stools . Positive as in hpi- constipation better   Genitourinary: Negative for dysuria, flank pain and hematuria. Positive for nocturia  Neurologic: Negative for headaches, , seizures, weakness . Positive for tingling and numbness of right 2 lateral fingers   Endo/Heme/Allergies: Positive for  nocturia.  Denies any polyphagia. Positive for polyuria     /67 (BP Location: Left arm, Patient Position: Sitting, BP Cuff Size: Large adult)   Pulse 88   Temp 36 °C (96.8 °F) (Temporal)   Ht 1.575 m (5' 2\")   Wt 110 kg (242 lb 9.6 oz)   LMP 03/30/2011   SpO2 95%   BMI 44.37 kg/m²     Physical Exam   Constitutional:   Not in acute distress   Cardiovascular:  Regular rate and rhythm, N  Respiratory: Clear to auscultation bilaterally, No use of accessory muscles  "   Neurologic: Alert & oriented x 4,     Note: I have reviewed all pertinent labs and diagnostic tests associated with this visit with specific comments listed under the assessment and plan below    Assessment and Plan    1. Hyperglycemia  Blood sugar over 300, patient noncompliant to insulin did not take insulin last night.  - Denies any symptoms suggestive of ketosis, able to keep water and food down.  - Encouraged to get back home and immediately take long-acting insulin dose which she is taking 45 units of.  - Patient does not have blood glucose meter that she lost.  Which has been prescribed today.  - Reinforced the importance of taking insulin every day.    - Blood Glucose Test Strips; Test strips order: Test strips for One Touch Verio meter. Sig: use once and prn ssx high or low sugar #100 RF x 3  Dispense: 100 Strip; Refill: 3    2. Type 2 diabetes mellitus with nephropathy (HCC) (Primary)  Management of hyperglycemia that is acute as above.  - Recommended obtaining lab work, has appointment with nephrology in the future.  - Insulin pen needles prescribed which patient does not have a refill for as per patient.  - Recommended to continue insulin 44 units however to take regularly at the same time every day.  - Patient is having blurring of vision which is possibly secondary to hyperglycemia which should get better with sugars trending down however encouraged to follow-up with ophthalmologist for routine retinopathy screening.  Patient hesitant to go prior to that time blood sugar levels.  - Ordered complete metabolic panel, microalbumin creatinine ratio  - Proteinuria noticed on the last blood test, Farxiga being prescribed previously prescribed not covered by insurance.  - Monofilament test today was normal with previously was normal.  - Tingling on the fingers of the right hand likely from carpal tunnel which possibly is being made worse by hypoglycemia.  - POCT Glucose  - POCT  A1C  - Comp Metabolic Panel;  Future  - Micoalbumin Creat Ratio Urine; Future  - Insulin Pen Needle 32 G x 4 mm (BD PEN NEEDLE LAURA 2ND GEN); USE TO INJECT INSULIN EVERY DAY  Dispense: 100 Each; Refill: 1  - Blood Glucose Meter Kit; Test blood sugar as recommended by provider. True Metrix blood glucose monitoring kit.  Dispense: 1 Kit; Refill: 0  - Lancets; USE ONE LANCET TO TEST BLOOD SUGAR ONCE DAILY  Dispense: 100 Each; Refill: 1  - Alcohol Swabs; Wipe site with prep pad prior to injection.  Dispense: 100 Each; Refill: 1  - dapagliflozin propanediol (FARXIGA) 10 MG Tab; Take 1 Tablet by mouth every day.  Dispense: 90 Tablet; Refill: 1    3. Poor compliance with medication  Reports pharmacy situation which is preventing her from taking medications at the writer every day.  - Patient encouraged to try her best to prevent future complications including heart attack strokes especially given her history of CVA.  - Patient is currently taking aspirin and atorvastatin for secondary prevention.      4. Substance abuse (HCC)  Was using meth under 1 and half months ago, patient has been able to abstain from it however still having issues being compliant to medication -she will work on it.      5. Primary hypertension  Well-controlled today.  - Micoalbumin Creat Ratio Urine; Future  - CBC WITHOUT DIFFERENTIAL; Future  - TSH WITH REFLEX TO FT4; Future    6. Sacral pain  Likely secondary to overuse injury/muscle strain, intermittent with certain positions/use.  - Conservative management use of Tylenol, ibuprofen only as needed    7. Hypothyroidism, unspecified type  Previous TSH 10, but free T4 normal, patient is also gaining weight.  - Previous history of constipation, will get repeat thyroid function study as it has been 6 months as above thyroid function study, normal adjustments in medications were made after that lab test.  - Patient recommended to get lab test as soon as possible and follow-up in 2 weeks  - TSH WITH REFLEX TO FT4; Future    8.  Erythrocytosis  History of erythrocytosis.  Repeat CBC  - CBC WITHOUT DIFFERENTIAL; Future    9. Vitamin D deficiency    - VITAMIN D,25 HYDROXY (DEFICIENCY); Future    10. Blurry vision, bilateral  Patient is having blurring of vision which is possibly secondary to hyperglycemia which should get better with sugars trending down however encouraged to follow-up with ophthalmologist for routine retinopathy screening.  Patient hesitant to go prior to that time blood sugar levels.    11. Right carpal tunnel syndrome  Recommended use of carpal tunnel braces available over-the-counter.    Followup: Return in about 2 weeks (around 7/17/2025).        Signed by: Kris White M.D.

## 2025-07-03 ENCOUNTER — OFFICE VISIT (OUTPATIENT)
Dept: INTERNAL MEDICINE | Facility: OTHER | Age: 55
End: 2025-07-03
Payer: COMMERCIAL

## 2025-07-03 VITALS
SYSTOLIC BLOOD PRESSURE: 100 MMHG | OXYGEN SATURATION: 95 % | TEMPERATURE: 96.8 F | HEIGHT: 62 IN | HEART RATE: 88 BPM | BODY MASS INDEX: 44.64 KG/M2 | WEIGHT: 242.6 LBS | DIASTOLIC BLOOD PRESSURE: 67 MMHG

## 2025-07-03 DIAGNOSIS — E11.21 TYPE 2 DIABETES MELLITUS WITH NEPHROPATHY (HCC): Primary | ICD-10-CM

## 2025-07-03 DIAGNOSIS — D75.1 ERYTHROCYTOSIS: ICD-10-CM

## 2025-07-03 DIAGNOSIS — E03.9 HYPOTHYROIDISM, UNSPECIFIED TYPE: ICD-10-CM

## 2025-07-03 DIAGNOSIS — F19.10 SUBSTANCE ABUSE (HCC): ICD-10-CM

## 2025-07-03 DIAGNOSIS — H53.8 BLURRY VISION, BILATERAL: ICD-10-CM

## 2025-07-03 DIAGNOSIS — G56.01 RIGHT CARPAL TUNNEL SYNDROME: ICD-10-CM

## 2025-07-03 DIAGNOSIS — E55.9 VITAMIN D DEFICIENCY: ICD-10-CM

## 2025-07-03 DIAGNOSIS — E78.5 DYSLIPIDEMIA: ICD-10-CM

## 2025-07-03 DIAGNOSIS — Z91.148 POOR COMPLIANCE WITH MEDICATION: ICD-10-CM

## 2025-07-03 DIAGNOSIS — R73.9 HYPERGLYCEMIA: ICD-10-CM

## 2025-07-03 DIAGNOSIS — M53.3 SACRAL PAIN: ICD-10-CM

## 2025-07-03 DIAGNOSIS — I10 PRIMARY HYPERTENSION: ICD-10-CM

## 2025-07-03 PROBLEM — R63.4 WEIGHT LOSS: Status: RESOLVED | Noted: 2024-03-29 | Resolved: 2025-07-03

## 2025-07-03 LAB
GLUCOSE BLD-MCNC: >600 MG/DL (ref 65–99)
HBA1C MFR BLD: 15 % (ref ?–5.8)
POCT INT CON NEG: NEGATIVE
POCT INT CON POS: POSITIVE

## 2025-07-03 PROCEDURE — 99214 OFFICE O/P EST MOD 30 MIN: CPT | Performed by: INTERNAL MEDICINE

## 2025-07-03 PROCEDURE — 3074F SYST BP LT 130 MM HG: CPT | Performed by: INTERNAL MEDICINE

## 2025-07-03 PROCEDURE — 82962 GLUCOSE BLOOD TEST: CPT | Performed by: INTERNAL MEDICINE

## 2025-07-03 PROCEDURE — 3078F DIAST BP <80 MM HG: CPT | Performed by: INTERNAL MEDICINE

## 2025-07-03 PROCEDURE — 83036 HEMOGLOBIN GLYCOSYLATED A1C: CPT | Performed by: INTERNAL MEDICINE

## 2025-07-03 RX ORDER — GLUCOSAMINE HCL/CHONDROITIN SU 500-400 MG
CAPSULE ORAL
Qty: 100 EACH | Refills: 1 | Status: SHIPPED | OUTPATIENT
Start: 2025-07-03

## 2025-07-03 RX ORDER — PEN NEEDLE, DIABETIC 32GX 5/32"
NEEDLE, DISPOSABLE MISCELLANEOUS
Qty: 100 EACH | Refills: 1 | Status: SHIPPED | OUTPATIENT
Start: 2025-07-03 | End: 2025-07-18 | Stop reason: SDUPTHER

## 2025-07-03 RX ORDER — DAPAGLIFLOZIN 10 MG/1
10 TABLET, FILM COATED ORAL DAILY
Qty: 90 TABLET | Refills: 1 | Status: SHIPPED | OUTPATIENT
Start: 2025-07-03

## 2025-07-03 RX ORDER — AVOBENZONE, HOMOSALATE, OCTISALATE, OCTOCRYLENE 30; 40; 45; 26 MG/ML; MG/ML; MG/ML; MG/ML
CREAM TOPICAL
Qty: 100 EACH | Refills: 1 | Status: SHIPPED | OUTPATIENT
Start: 2025-07-03

## 2025-07-07 ENCOUNTER — TELEPHONE (OUTPATIENT)
Dept: INTERNAL MEDICINE | Facility: OTHER | Age: 55
End: 2025-07-07
Payer: COMMERCIAL

## 2025-07-07 NOTE — TELEPHONE ENCOUNTER
FINAL PRIOR AUTHORIZATION STATUS:    1.  Name of Medication & Dose: Farxiga 10MG tablets     2. Prior Auth Status: Approved through 12/31/2025     3. Action Taken: Pharmacy Notified: yes Patient Notified: yes

## 2025-07-15 ENCOUNTER — HOSPITAL ENCOUNTER (OUTPATIENT)
Dept: LAB | Facility: MEDICAL CENTER | Age: 55
End: 2025-07-15
Attending: INTERNAL MEDICINE
Payer: COMMERCIAL

## 2025-07-15 DIAGNOSIS — E11.21 TYPE 2 DIABETES MELLITUS WITH NEPHROPATHY (HCC): ICD-10-CM

## 2025-07-15 DIAGNOSIS — E55.9 VITAMIN D DEFICIENCY: ICD-10-CM

## 2025-07-15 DIAGNOSIS — E78.5 DYSLIPIDEMIA: ICD-10-CM

## 2025-07-15 DIAGNOSIS — D75.1 ERYTHROCYTOSIS: ICD-10-CM

## 2025-07-15 DIAGNOSIS — E03.9 HYPOTHYROIDISM, UNSPECIFIED TYPE: ICD-10-CM

## 2025-07-15 DIAGNOSIS — I10 PRIMARY HYPERTENSION: ICD-10-CM

## 2025-07-15 LAB
25(OH)D3 SERPL-MCNC: 23 NG/ML (ref 30–100)
ALBUMIN SERPL BCP-MCNC: 4.1 G/DL (ref 3.2–4.9)
ALBUMIN/GLOB SERPL: 1.2 G/DL
ALP SERPL-CCNC: 94 U/L (ref 30–99)
ALT SERPL-CCNC: 24 U/L (ref 2–50)
ANION GAP SERPL CALC-SCNC: 13 MMOL/L (ref 7–16)
AST SERPL-CCNC: 19 U/L (ref 12–45)
BILIRUB SERPL-MCNC: 0.6 MG/DL (ref 0.1–1.5)
BUN SERPL-MCNC: 24 MG/DL (ref 8–22)
CALCIUM ALBUM COR SERPL-MCNC: 9.6 MG/DL (ref 8.5–10.5)
CALCIUM SERPL-MCNC: 9.7 MG/DL (ref 8.5–10.5)
CHLORIDE SERPL-SCNC: 99 MMOL/L (ref 96–112)
CHOLEST SERPL-MCNC: 157 MG/DL (ref 100–199)
CO2 SERPL-SCNC: 22 MMOL/L (ref 20–33)
CREAT SERPL-MCNC: 1.19 MG/DL (ref 0.5–1.4)
CREAT UR-MCNC: 45.5 MG/DL
ERYTHROCYTE [DISTWIDTH] IN BLOOD BY AUTOMATED COUNT: 46.3 FL (ref 35.9–50)
FASTING STATUS PATIENT QL REPORTED: NORMAL
GFR SERPLBLD CREATININE-BSD FMLA CKD-EPI: 55 ML/MIN/1.73 M 2
GLOBULIN SER CALC-MCNC: 3.5 G/DL (ref 1.9–3.5)
GLUCOSE SERPL-MCNC: 295 MG/DL (ref 65–99)
HCT VFR BLD AUTO: 45.6 % (ref 37–47)
HDLC SERPL-MCNC: 44 MG/DL
HGB BLD-MCNC: 14.7 G/DL (ref 12–16)
LDLC SERPL CALC-MCNC: 94 MG/DL
MCH RBC QN AUTO: 29.6 PG (ref 27–33)
MCHC RBC AUTO-ENTMCNC: 32.2 G/DL (ref 32.2–35.5)
MCV RBC AUTO: 91.8 FL (ref 81.4–97.8)
MICROALBUMIN UR-MCNC: 2.8 MG/DL
MICROALBUMIN/CREAT UR: 62 MG/G (ref 0–30)
PLATELET # BLD AUTO: 216 K/UL (ref 164–446)
PMV BLD AUTO: 13.8 FL (ref 9–12.9)
POTASSIUM SERPL-SCNC: 4.7 MMOL/L (ref 3.6–5.5)
PROT SERPL-MCNC: 7.6 G/DL (ref 6–8.2)
RBC # BLD AUTO: 4.97 M/UL (ref 4.2–5.4)
SODIUM SERPL-SCNC: 134 MMOL/L (ref 135–145)
T4 FREE SERPL-MCNC: 0.42 NG/DL (ref 0.93–1.7)
TRIGL SERPL-MCNC: 97 MG/DL (ref 0–149)
TSH SERPL DL<=0.005 MIU/L-ACNC: 63.6 UIU/ML (ref 0.38–5.33)
WBC # BLD AUTO: 9.9 K/UL (ref 4.8–10.8)

## 2025-07-15 PROCEDURE — 82306 VITAMIN D 25 HYDROXY: CPT

## 2025-07-15 PROCEDURE — 80053 COMPREHEN METABOLIC PANEL: CPT

## 2025-07-15 PROCEDURE — 36415 COLL VENOUS BLD VENIPUNCTURE: CPT

## 2025-07-15 PROCEDURE — 85027 COMPLETE CBC AUTOMATED: CPT

## 2025-07-15 PROCEDURE — 84439 ASSAY OF FREE THYROXINE: CPT

## 2025-07-15 PROCEDURE — 80061 LIPID PANEL: CPT

## 2025-07-15 PROCEDURE — 82043 UR ALBUMIN QUANTITATIVE: CPT

## 2025-07-15 PROCEDURE — 82570 ASSAY OF URINE CREATININE: CPT

## 2025-07-15 PROCEDURE — 84443 ASSAY THYROID STIM HORMONE: CPT

## 2025-07-16 ENCOUNTER — OFFICE VISIT (OUTPATIENT)
Dept: INTERNAL MEDICINE | Facility: OTHER | Age: 55
End: 2025-07-16
Payer: COMMERCIAL

## 2025-07-16 VITALS
BODY MASS INDEX: 46.08 KG/M2 | WEIGHT: 250.4 LBS | OXYGEN SATURATION: 94 % | TEMPERATURE: 97.6 F | HEART RATE: 72 BPM | SYSTOLIC BLOOD PRESSURE: 108 MMHG | HEIGHT: 62 IN | DIASTOLIC BLOOD PRESSURE: 73 MMHG

## 2025-07-16 DIAGNOSIS — E03.9 HYPOTHYROIDISM, UNSPECIFIED TYPE: ICD-10-CM

## 2025-07-16 DIAGNOSIS — E11.21 TYPE 2 DIABETES MELLITUS WITH NEPHROPATHY (HCC): ICD-10-CM

## 2025-07-16 DIAGNOSIS — E55.9 VITAMIN D DEFICIENCY: Primary | ICD-10-CM

## 2025-07-16 DIAGNOSIS — Z86.73 HISTORY OF CVA (CEREBROVASCULAR ACCIDENT): ICD-10-CM

## 2025-07-16 PROCEDURE — 99214 OFFICE O/P EST MOD 30 MIN: CPT | Performed by: INTERNAL MEDICINE

## 2025-07-16 PROCEDURE — 3078F DIAST BP <80 MM HG: CPT | Performed by: INTERNAL MEDICINE

## 2025-07-16 PROCEDURE — 3074F SYST BP LT 130 MM HG: CPT | Performed by: INTERNAL MEDICINE

## 2025-07-16 RX ORDER — MULTIVIT-MIN/IRON/FOLIC ACID/K 18-600-40
2000 CAPSULE ORAL DAILY
Qty: 90 CAPSULE | Refills: 1 | Status: SHIPPED | OUTPATIENT
Start: 2025-07-16

## 2025-07-16 RX ORDER — LEVOTHYROXINE SODIUM 150 UG/1
150 TABLET ORAL
Qty: 90 TABLET | Refills: 1 | Status: SHIPPED | OUTPATIENT
Start: 2025-07-16

## 2025-07-16 RX ORDER — INSULIN GLARGINE-YFGN 100 [IU]/ML
34 INJECTION, SOLUTION SUBCUTANEOUS EVERY EVENING
Qty: 40 ML | Refills: 1 | Status: SHIPPED | OUTPATIENT
Start: 2025-07-16 | End: 2025-07-16

## 2025-07-16 RX ORDER — LEVOTHYROXINE SODIUM 150 UG/1
150 TABLET ORAL
Qty: 30 TABLET | Refills: 1 | Status: SHIPPED | OUTPATIENT
Start: 2025-07-16 | End: 2025-07-16

## 2025-07-16 RX ORDER — INSULIN GLARGINE-YFGN 100 [IU]/ML
44 INJECTION, SOLUTION SUBCUTANEOUS EVERY EVENING
Qty: 40 ML | Refills: 1 | Status: SHIPPED | OUTPATIENT
Start: 2025-07-16 | End: 2025-07-18

## 2025-07-16 RX ORDER — ASPIRIN 81 MG/1
81 TABLET, CHEWABLE ORAL DAILY
Qty: 90 TABLET | Refills: 1 | Status: SHIPPED | OUTPATIENT
Start: 2025-07-16

## 2025-07-16 ASSESSMENT — PAIN SCALES - GENERAL: PAINLEVEL_OUTOF10: NO PAIN

## 2025-07-16 ASSESSMENT — FIBROSIS 4 INDEX: FIB4 SCORE: 0.93

## 2025-07-16 NOTE — PATIENT INSTRUCTIONS
Start taking vitamin 2000 IU   Take levothyroxine daily empty stomach   Bring log of sugars in 3 weeks.   Schedule mammogram at [phone].

## 2025-07-16 NOTE — PROGRESS NOTES
Chief Complaint   Patient presents with    Diabetes    Medication Management    Lab Results       HPI: Isa Espinoza is a 52 y.o. female with past medical history as below who presented to the clinic  for the following     Patient is here to discuss lab work and for the following.       *Type DM   *Hyperglycemia   -with symptoms as detailed below-> 600 on poc glucose test on last visit 2 weeks ago  Aic > 15   Patient reports increased thirst and polyuria   -Blurring of vision and tingling in median nerve distribution of right arm only fingers.  Denies any chest pain ,  palpitations  . Does feel  exhausted out of breath, lightheaded with minimal exertion-Patient denies any nausea, abdominal pain vomiting at this time.   Admits to non adherence to insulin, also partially due to nonavailability of insulin at the pharmacy she is currently obtaining her medications from.  - Aic of 6.7 in jUly 2022 , was on metformin 500 SR, > march, 2023 was  8> September 2023> 14 > A1c y in jan 2024 7.4 > March 2024 - 6.2, last A1c from January 2025 was 7.8> now> 15   - taking metformin 2000 mg - NOT regularly due to family situation   - tolerating glucopghage well, patient missed last night's dose of insulin again because she ran out of the insulin.  Reports that her pharmacy does not have stock of that insulin.  - Called pharmacy during appointment today however unable to connect through despite long wait.  - Called pharmacy later after appointment was able to connect the conveyed to me the information that their insulin is not available in the pharmacy at this time, alternate insulin also not available.  Glucometer also not available.  - They in fact recommended trialing a different pharmacy like [facility] or [facility].  I called [facility]  [place] who let me know that they do have stock of it, prescription sent there, I attempted to call patient at the phone number she provided however no answer, called son's phone and conveyed the  message to  [place].  - Patient given strict instructions to start insulin as soon as possible explaining risk of DKA imminent complications if not going back on insulin.  Patient expresses understanding.  Started farxiga yesterday - tolerating well reports urinating more often - feels like a urine infection is coming on - enocuaraged to stay hydrated and reach out if symptoms are developing     -also reports that the pharmacy was short on pen needles and because of that she missed some doses   -Patient has not been checking blood sugars- as she reports having lost her meter - new prescription sent however [facility] again does not carry it at he time   - [facility] for glucometer which is available for a much discounted price however unable to talk to patient to convey this information.  Will try to call her again on Friday to convey this message.    -Encouraged to check blood sugars at least fasting state and bring a log to the next visit so we can make adjustments along with blood work.- follow up in 2 weeks     -From sept 2023- albumin creatinine ratio showing proteinuria= 85, renal function 15/1.03 with egfr 63 of , with UA showing ketones, protein glucose, high specific gravity, squamous epithelial cell, hyaline cast and few bacteria with no burning urgency, fevers chills, flank pain at this time > 79 repeat albumin creat from jan 2025, BUN/creatinine of 16/1.10.  Potassium of 4  Microalbumin creat ratio from July - 62    patient was referred to nephrologist on the last visit by other provider - patient has seen them since   no changes   -Patient was seeing optometrist - hwoever she was told that she needed to control her sugars before they could do anything and hence she does not want to do go see then until then as per their instructions.      *Hypothyroidism   -TSH of 26 in 2015, restarted on levothyroxine 150 mcg. With intermittent non adherence,   TSH  wnl from September 2023> from jan 2025 10.21, with free T4 normal at 1.1. no changes in dose patient has not been taking medication appropriately since - TSH 63.6, has also gained weight   Recommended to take morning empty stomach - will check again in 6 weeks - may likely need higher dose given weight changes                                               Other problems not discussed on this visit   *Polysubstance use   -Patient reports that she has not taken methamphetamine or cigarettes for the past 1 and half months.  - Patient is currently smoking marijuana.  - Currently still living in an environment where people are using these substances however patient has been able to stay away from them.  *low back pain.  -Reports midline pain radiating to both sides in the low back, more on the left butt cheek area as per patient.  Which is worse with standing straight not present on lying down.  Slightly better with sitting and standing on 1 leg at a time.   -Not associated with any numbness or tingling or weakness.  -Not associate with hip pain, groin pain.  -Denies having had any falls or lifted anything heavy unusually.  -The symptoms were addressed on the last visit since then the symptoms have been intermittent and no particular triggers that patient has been able to identify.  - She takes Tylenol.  - Recommended to use as needed ibuprofen if her GERD is getting worse and to stop using it.  *muscle spasms   every night for last 2 week s however she is tries to stretch.  -She feels this gets  worse with cold   - She is staying well-hydrated however sugars are extremely high.  - Likely secondary to electrolyte fluctuations from high sugars.  Patient encouraged to stay hydrated and management of sugars as above.    #DLD   -ldl of 69 as per most recent labs in September 2023- at goal on atorvastatin 40 and lifestyle changes   ,   MRI does show chronic microvascular ischemic changes    Labs lipid profile from January  2025 showed total cholesterol/HDL/triglycerides/LDL of 142/54/87/71  *GERD.  -Patient requesting for refill of omeprazole which has been placed however this issue was not discussed in detail on this visit.  *Right hand tingling.  -Reports ongoing for the past 1 to 2 months.  -On the first and third digits.  -Does not feel objects normally as compared to the other hand.  -Has not dropped any objects.  -On examination Tinel and phallen sign positive.  -Discussed using a brace, reassess in 1 month when we see patient for  *Hypertension -  Has history of CVA.    Was Well managed with lisinopril 30 mg -   -Elevated today in clinic, patient reports that she randomly checks blood pressures at home and it has been less than 150/90, has seen numbers  as low as 120 systolic.   -Reports taking medication regularly not running out of it.  -Were encouraged to keep a log of blood pressures and bring to 1 month follow-up visit  -Recreational substance use not discussed during this visit-something to bring up on the next visit as this could be a potential cause for hypertension  *Dizziness - none reported on visit today - description from previous visit as below   -reports that she has had almost no episodes that were noticeable since last visit   -happens randomly even when sitting - for few seconds - patient has to distract herself - shake it off, take a walk and it goes away . . Feels like room spinning   Reports that her blood pressures and sugars have been normal during these episodes, however patient is unable to tell me the numbers.   Encouraged to check them and write them down   Neuro exam including cerebellar, romberg were all normal   Has not been drinking enough water   Ear exam - wax on right ear - feels clicking on that side - instructed to use debrox   Cerumen in ear   -Plan as above  *Feeling full in throat   -resolved - back on omeprazole.   #Hot flashes   -Not bothering her too much   She sleeps with her window  open and carries around a fan everywhere.   #Erythrocytosis   -oxygen saturation of 97%.   -Unclear if patient has sleep apnea. Unsure if she snores.   STOP BANG : intermediate, neck circumference not measured. Patient has not had a chance to ask son regarding snoring   *Substance use disorder   -did have intermittent relapses however has been abstinent from meth since last visit and 1 month prior to that atleast as paer patient   -Dental procedure has not been scheduled yet- appointment in december  -Currently smoking marijuana every other day  -States that she smokes a few cigarettes a month  -Social alcohol use.   -Patient had not presented to appointments for a few months after which she presents today.  *History of CVA with substance use at the time   -TIA like symptoms happened  - 2 years ago-in 2021?- when she had slurry speech, vision changes on one eye, was emotional, seen at [facility] with resolution of symptoms in a few hours with recommendation to follow up with \[facility]\"" clinic which she did not do at the time due to resolution of symptoms and then forgot about it. Patient was smoking cannabis and also methamphetamine at the time. Patient recently had an eye exam done at [facility] and was told that she has vision changes suggestive of a stroke for which an MRI brain was performed in August 2022 which confirmed the presence of a  old left temporal occipital stroke with chronic microvascular ischemic changes.     Patient currently states that she has worse vision on left eye, however doing well with glasses both for near and far sightedness ( 2 separate glasses) and still drives. No new vision changes.  Echo cardiogram in April 2021 showed trace mR, AI and Trace TR   *CKD stage 3a> CKD stage 2  As per most recent labs.   albumin creatinine ratio showing proteinuria= 85, renal function 15/1.03 with egfr 63 of , with UA showing ketones, protein glucose, high specific gravity, squamous epithelial cell,  hyaline cast and few bacteria with no burning urgency, fevers chills, flank pain at this time         *Preventative health  -Patient reports that she has received her shingles vaccine  -She reports that she has an appointment for colonoscopy, still awaiting a callback from them to schedule an appointment she will be calling them since she has not heard back from them for 3 weeks.                   Family History   Problem Relation Age of Onset    Stroke Mother         Patient states taht mother was way older than when she or her sister had stroked    Cancer Mother         States that it was somewhere near stomach which had already spread and she went to hospice upon diagnosis    Stroke Father     Stroke Sister 45        Paralysed neck below    No Known Problems Paternal Aunt       Social History     Socioeconomic History    Marital status:    Tobacco Use    Smoking status: Former     Current packs/day: 0.25     Average packs/day: 0.3 packs/day for 29.0 years (7.3 ttl pk-yrs)     Types: Cigarettes    Smokeless tobacco: Former    Tobacco comments:     tried it chewing tobacco but never continued use   Vaping Use    Vaping status: Never Used   Substance and Sexual Activity    Alcohol use: Yes     Comment: on special occasions    Drug use: Yes     Frequency: 7.0 times per week     Types: Marijuana, Methamphetamines     Comment: Marijuana daily, discontinued methamphetamine use          Patient Active Problem List    Diagnosis Date Noted    Hyperglycemia 07/03/2025    Right carpal tunnel syndrome 07/03/2025    Poor compliance with medication 07/03/2025    Sacral pain 02/25/2025    Class 3 severe obesity due to excess calories with body mass index (BMI) of 40.0 to 44.9 in adult 01/28/2025    Helicobacter pylori infection 03/29/2024    Impacted cerumen of right ear 03/29/2024    GERD (gastroesophageal reflux disease) 10/06/2023    Erythrocytosis 03/19/2023    Dyslipidemia 03/19/2023    Hot flashes 03/19/2023    CKD  (chronic kidney disease) stage 2, GFR 60-89 ml/min 02/19/2023    History of CVA (cerebrovascular accident) 04/27/2022    Blurry vision, bilateral 04/19/2021    Substance abuse (HCC) 04/19/2021    Hypothyroidism 08/11/2020    Type 2 diabetes mellitus with nephropathy (HCC) 08/11/2020    Dizziness 08/02/2018    Obesity with body mass index 30 or greater 07/13/2017    HTN (hypertension) 04/17/2015       Current Outpatient Medications   Medication Sig Dispense Refill    aspirin (ASA) 81 MG Chew Tab chewable tablet Chew 1 Tablet every day. CHEW AND SWALLOW 90 Tablet 1    Cholecalciferol (VITAMIN D) 50 MCG (2000 UT) Cap Take 1 Capsule by mouth every day. 90 Capsule 1    Insulin Glargine-yfgn 100 UNIT/ML Solution Pen-injector Inject 44 Units under the skin every evening. If not injecting 28 units at this time, then continue the current dose and titrate by 2 units every 2 days to maintain morning fasting sugar between 100-120. If < 80 please decrease by 5 units for next dose and contact Dr. White. 40 mL 1    Insulin Pen Needle 32 G x 4 mm (BD PEN NEEDLE LAURA 2ND GEN) USE TO INJECT INSULIN EVERY  Each 1    Blood Glucose Test Strips Test strips order: Test strips for One Touch Verio meter. Sig: use once and prn ssx high or low sugar #100 RF x 3 100 Strip 3    Blood Glucose Meter Kit Test blood sugar as recommended by provider. True Metrix blood glucose monitoring kit. 1 Kit 0    Lancets USE ONE LANCET TO TEST BLOOD SUGAR ONCE DAILY 100 Each 1    Alcohol Swabs Wipe site with prep pad prior to injection. 100 Each 1    dapagliflozin propanediol (FARXIGA) 10 MG Tab Take 1 Tablet by mouth every day. 90 Tablet 1    omeprazole (PRILOSEC) 20 MG delayed-release capsule Take 1 Capsule by mouth every day. 90 Capsule 1    metformin (GLUCOPHAGE) 1000 MG tablet TAKE 1 TABLET BY MOUTH TWICE DAILY WITH MEALS 180 Tablet 1    atorvastatin (LIPITOR) 40 MG Tab Take 1 Tablet by mouth every evening. 90 Tablet 1    lisinopril (PRINIVIL) 40  "MG tablet Take 1 Tablet by mouth every day. 100 Tablet 3    glucose blood (ONETOUCH VERIO) strip TEST BLOOD SUGAR ONCE DAILY AND AS NEEDED FOR SYMPTOMS OF HIGH OR LOW BLOOD SUGAR 100 Strip 1    levothyroxine (SYNTHROID) 150 MCG Tab TAKE 1 TABLET BY MOUTH EVERY MORNING ON AN EMPTY STOMACH 90 Tablet 1    Nutritional Supplements (GLUCOSE MANAGEMENT) Tab Take 4 Tablets by mouth as needed (for hypoglycemia). (Patient not taking: Reported on 7/16/2025) 15 Tablet 1     No current facility-administered medications for this visit.       ROS: As per HPI. Additional pertinent systems as noted below.  Constitutional:  Negative for fever, chills   Cardiovascular:   denies any  palpitations at this time  Respiratory:  Negative for cough, sputum production, . Positive as in hpi   Gastrointestinal: Negative for abdominal pain, nausea, vomiting, , or blood in stools . Positive as in hpi- constipation better   Genitourinary: Negative for dysuria, flank pain and hematuria. Positive for nocturia  Neurologic: Negative for headaches, , seizures, weakness . Positive for tingling and numbness of right 2 lateral fingers . Positive for dizziness  Endo/Heme/Allergies: Positive for  nocturia.  Denies any polyphagia. Positive for polyuria     /73 (BP Location: Left arm, Patient Position: Sitting, BP Cuff Size: Large adult long)   Pulse 72   Temp 36.4 °C (97.6 °F) (Temporal)   Ht 1.575 m (5' 2\")   Wt 114 kg (250 lb 6.4 oz)   LMP 03/30/2011   SpO2 94%   Breastfeeding No   BMI 45.80 kg/m²     Physical Exam   Constitutional:   Not in acute distress       Note: I have reviewed all pertinent labs and diagnostic tests associated with this visit with specific comments listed under the assessment and plan below    Assessment and Plan    1. Type 2 diabetes mellitus with nephropathy (HCC)  Refer to HPI for details.  From restenting insulin to [facility] instead of [facility] given issues with shortage of insulin at that current pharmacy-refer to " details about conversation with pharmacy under HPI.  Next-continue Farxiga, metformin.  Next-check sugars in the morning keep a log, follow-up in 2 weeks.  - Patient educated regarding importance of starting insulin as soon as possible to avoid DKA, other complications that are imminent.  - Patient expresses understanding.  Mild stable proteinuria, already established with nephrology.  - Started appropriately on SGLT2 inhibitor.  Tolerating well at this time.  Encouraged hydration  - Patient given instructions on maintaining sugars between 100 120 if lower than 100 to back up by 5 units on glargine and if higher than 120, will see patient in 2 weeks and instruct on how to adjust insulin.    - Insulin Glargine-yfgn 100 UNIT/ML Solution Pen-injector; Inject 44 Units under the skin every evening. If not injecting 28 units at this time, then continue the current dose and titrate by 2 units every 2 days to maintain morning fasting sugar between 100-120. If < 80 please decrease by 5 units for next dose and contact Dr. White.  Dispense: 40 mL; Refill: 1    2. Hypothyroidism, unspecified type  Patient reports not taking levothyroxine in the morning on an empty stomach as she thought it was lisinopril that needed to be taken like that.  - Educated regarding this.  - Patient also likely needs higher dose however we will try to have patient take it regularly first while working on controlling sugars which may impact weight also.  - We will check thyroid functions in 6 weeks and decide whether to change dose or not.    - TSH WITH REFLEX TO FT4; Future    3. Vitamin D deficiency (Primary)  Prescribed vitamin D  - Cholecalciferol (VITAMIN D) 50 MCG (2000 UT) Cap; Take 1 Capsule by mouth every day.  Dispense: 90 Capsule; Refill: 1    4. History of CVA (cerebrovascular accident)  Aspirin refill placed.  - aspirin (ASA) 81 MG Chew Tab chewable tablet; Chew 1 Tablet every day. CHEW AND SWALLOW  Dispense: 90 Tablet; Refill: 1      Followup: Return in about 2 weeks (around 7/30/2025).        Signed by: Kris White M.D.

## 2025-07-18 ENCOUNTER — TELEPHONE (OUTPATIENT)
Dept: INTERNAL MEDICINE | Facility: OTHER | Age: 55
End: 2025-07-18
Payer: COMMERCIAL

## 2025-07-18 DIAGNOSIS — E11.21 TYPE 2 DIABETES MELLITUS WITH NEPHROPATHY (HCC): Primary | ICD-10-CM

## 2025-07-18 RX ORDER — PEN NEEDLE, DIABETIC 32GX 5/32"
NEEDLE, DISPOSABLE MISCELLANEOUS
Qty: 100 EACH | Refills: 1 | Status: SHIPPED | OUTPATIENT
Start: 2025-07-18

## 2025-07-18 RX ORDER — INSULIN DETEMIR 100 [IU]/ML
20 INJECTION, SOLUTION SUBCUTANEOUS 2 TIMES DAILY
Qty: 6 ML | Refills: 0 | Status: SHIPPED | OUTPATIENT
Start: 2025-07-18

## 2025-07-18 RX ORDER — INSULIN DETEMIR 100 [IU]/ML
20 INJECTION, SOLUTION SUBCUTANEOUS 2 TIMES DAILY
Qty: 6 ML | Refills: 0 | Status: SHIPPED | OUTPATIENT
Start: 2025-07-18 | End: 2025-07-18

## 2025-07-18 NOTE — TELEPHONE ENCOUNTER
Received a call from patient, stating that she has been out of the Insulin Glargine for the past three days.  When she picked it up last, she said they only gave her a partial fill.     Dr. White, can you try sending a new script to [id number] - [place], [place] - [address] AT [address] [[id number]].     I called pharmacy, but they said that she cannot  next fill until July 30th.     Thank you.

## 2025-07-18 NOTE — TELEPHONE ENCOUNTER
Called patient.  - Insulin was directed to [facility] because [facility] did not have stock of it.  - Patient reports that there is some hold on the insulin and they are unable to dispense it.  She is not sure.  If and also reports that she did find one of the glucometers that belonged to her sister and she has been able to use that to check her sugars hence she does not need a glucometer anymore.  If she does need pen needles which she is out of.  - She does have glucose strips lancets alcohol swabs.        Called [facility] again .  They report that insurance is not covering insulin until July 30.  They are unable to tell why as previous prescriptions were from [facility].  They suggested the patient can call insurance to see if they can refill it sooner.  Can call patient to relay this information.  - Sided to send Levemir twice daily with pen needles to [facility] hoping she can get this insulin sooner.  - Canceling all Lantus prescription.  - If patient is unable to get this prescription then she is instructed to go to the ER given that her sugars are at the dangerous levels of impending DKA    [facility] let  me know that levemir is not made anymore - will change to novolin N-that went through, patient can use it for 15 days and then switch back to Lantus when its time.  If she has an appointment with me in 2 weeks.     Called patient and let her know.

## 2025-08-01 ENCOUNTER — APPOINTMENT (OUTPATIENT)
Dept: INTERNAL MEDICINE | Facility: OTHER | Age: 55
End: 2025-08-01
Payer: COMMERCIAL

## 2025-08-01 PROBLEM — R51.9 HEADACHE: Status: ACTIVE | Noted: 2025-08-01

## 2025-08-01 ASSESSMENT — FIBROSIS 4 INDEX: FIB4 SCORE: 0.93

## 2025-08-01 NOTE — PROGRESS NOTES
No chief complaint on file.      HPI: Isa Espinoza is a 52 y.o. female with past medical history as below who presented to the clinic  for the following     Patient is here to discuss lab work and for the following.       *Type DM   *Hyperglycemia   -with symptoms as detailed below-> 600 on poc glucose test on last visit 2 weeks ago  Aic > 15   Patient reports increased thirst and polyuria   -Blurring of vision and tingling in median nerve distribution of right arm only fingers.  Denies any chest pain ,  palpitations  . Does feel  exhausted out of breath, lightheaded with minimal exertion-Patient denies any nausea, abdominal pain vomiting at this time.   Admits to non adherence to insulin, also partially due to nonavailability of insulin at the pharmacy she is currently obtaining her medications from.  - Aic of 6.7 in jUly 2022 , was on metformin 500 SR, > march, 2023 was  8> September 2023> 14 > A1c y in jan 2024 7.4 > March 2024 - 6.2, last A1c from January 2025 was 7.8> now> 15   - taking metformin 2000 mg - NOT regularly due to family situation   - tolerating glucopghage well, patient missed last night's dose of insulin again because she ran out of the insulin.  Reports that her pharmacy does not have stock of that insulin.  - Called pharmacy during appointment today however unable to connect through despite long wait.  - Called pharmacy later after appointment was able to connect the conveyed to me the information that their insulin is not available in the pharmacy at this time, alternate insulin also not available.  Glucometer also not available.  - They in fact recommended trialing a different pharmacy like [facility] or [email or web address].  I called [facility]  [place] who let me know that they do have stock of it, prescription sent there, I attempted to call patient at the phone number she provided however no answer, called son's phone and conveyed the message to  [place].  - Patient  given strict instructions to start insulin as soon as possible explaining risk of DKA imminent complications if not going back on insulin.  Patient expresses understanding.  Started farxiga yesterday - tolerating well reports urinating more often - feels like a urine infection is coming on - enocuaraged to stay hydrated and reach out if symptoms are developing     -also reports that the pharmacy was short on pen needles and because of that she missed some doses   -Patient has not been checking blood sugars- as she reports having lost her meter - new prescription sent however [facility] again does not carry it at he time   - [facility] also has a [employer] for glucometer which is available for a much discounted price however unable to talk to patient to convey this information.  Will try to call her again on Friday to convey this message.    -Encouraged to check blood sugars at least fasting state and bring a log to the next visit so we can make adjustments along with blood work.- follow up in 2 weeks     -From sept 2023- albumin creatinine ratio showing proteinuria= 85, renal function 15/1.03 with egfr 63 of , with UA showing ketones, protein glucose, high specific gravity, squamous epithelial cell, hyaline cast and few bacteria with no burning urgency, fevers chills, flank pain at this time > 79 repeat albumin creat from jan 2025, BUN/creatinine of 16/1.10.  Potassium of 4  Microalbumin creat ratio from July - 62    patient was referred to nephrologist on the last visit by other provider - patient has seen them since   no changes   -Patient was seeing optometrist - hwoever she was told that she needed to control her sugars before they could do anything and hence she does not want to do go see then until then as per their instructions.      *Hypothyroidism   -TSH of 26 in 2015, restarted on levothyroxine 150 mcg. With intermittent non adherence,   TSH wnl from September 2023> from jan 2025 10.21, with free T4 normal  at 1.1. no changes in dose patient has not been taking medication appropriately since - TSH 63.6, has also gained weight   Recommended to take morning empty stomach - will check again in 6 weeks - may likely need higher dose given weight changes                                               Other problems not discussed on this visit   *Polysubstance use   -Patient reports that she has not taken methamphetamine or cigarettes for the past 1 and half months.  - Patient is currently smoking marijuana.  - Currently still living in an environment where people are using these substances however patient has been able to stay away from them.  *low back pain.  -Reports midline pain radiating to both sides in the low back, more on the left butt cheek area as per patient.  Which is worse with standing straight not present on lying down.  Slightly better with sitting and standing on 1 leg at a time.   -Not associated with any numbness or tingling or weakness.  -Not associate with hip pain, groin pain.  -Denies having had any falls or lifted anything heavy unusually.  -The symptoms were addressed on the last visit since then the symptoms have been intermittent and no particular triggers that patient has been able to identify.  - She takes Tylenol.  - Recommended to use as needed ibuprofen if her GERD is getting worse and to stop using it.  *muscle spasms   every night for last 2 week s however she is tries to stretch.  -She feels this gets  worse with cold   - She is staying well-hydrated however sugars are extremely high.  - Likely secondary to electrolyte fluctuations from high sugars.  Patient encouraged to stay hydrated and management of sugars as above.    #DLD   -ldl of 69 as per most recent labs in September 2023- at goal on atorvastatin 40 and lifestyle changes   ,   MRI does show chronic microvascular ischemic changes    Labs lipid profile from January 2025 showed total cholesterol/HDL/triglycerides/LDL of  142/54/87/71  *GERD.  -Patient requesting for refill of omeprazole which has been placed however this issue was not discussed in detail on this visit.  *Right hand tingling.  -Reports ongoing for the past 1 to 2 months.  -On the first and third digits.  -Does not feel objects normally as compared to the other hand.  -Has not dropped any objects.  -On examination Tinel and phallen sign positive.  -Discussed using a brace, reassess in 1 month when we see patient for  *Hypertension -  Has history of CVA.    Was Well managed with lisinopril 30 mg -   -Elevated today in clinic, patient reports that she randomly checks blood pressures at home and it has been less than 150/90, has seen numbers  as low as 120 systolic.   -Reports taking medication regularly not running out of it.  -Were encouraged to keep a log of blood pressures and bring to 1 month follow-up visit  -Recreational substance use not discussed during this visit-something to bring up on the next visit as this could be a potential cause for hypertension  *Dizziness - none reported on visit today - description from previous visit as below   -reports that she has had almost no episodes that were noticeable since last visit   -happens randomly even when sitting - for few seconds - patient has to distract herself - shake it off, take a walk and it goes away . . Feels like room spinning   Reports that her blood pressures and sugars have been normal during these episodes, however patient is unable to tell me the numbers.   Encouraged to check them and write them down   Neuro exam including cerebellar, romberg were all normal   Has not been drinking enough water   Ear exam - wax on right ear - feels clicking on that side - instructed to use debrox   Cerumen in ear   -Plan as above  *Feeling full in throat   -resolved - back on omeprazole.   #Hot flashes   -Not bothering her too much   She sleeps with her window open and carries around a fan everywhere.    #Erythrocytosis   -oxygen saturation of 97%.   -Unclear if patient has sleep apnea. Unsure if she snores.   STOP BANG : intermediate, neck circumference not measured. Patient has not had a chance to ask son regarding snoring   *Substance use disorder   -did have intermittent relapses however has been abstinent from meth since last visit and 1 month prior to that atleast as paer patient   -Dental procedure has not been scheduled yet- appointment in december  -Currently smoking marijuana every other day  -States that she smokes a few cigarettes a month  -Social alcohol use.   -Patient had not presented to appointments for a few months after which she presents today.  *History of CVA with substance use at the time   -TIA like symptoms happened  - 2 years ago-in 2021?- when she had slurry speech, vision changes on one eye, was emotional, seen at [facility] with resolution of symptoms in a few hours with recommendation to follow up with [facility] clinic which she did not do at the time due to resolution of symptoms and then forgot about it. Patient was smoking cannabis and also methamphetamine at the time. Patient recently had an eye exam done at [facility] and was told that she has vision changes suggestive of a stroke for which an MRI brain was performed in August 2022 which confirmed the presence of a  old left temporal occipital stroke with chronic microvascular ischemic changes.     Patient currently states that she has worse vision on left eye, however doing well with glasses both for near and far sightedness ( 2 separate glasses) and still drives. No new vision changes.  Echo cardiogram in April 2021 showed trace mR, AI and Trace TR   *CKD stage 3a> CKD stage 2  As per most recent labs.   albumin creatinine ratio showing proteinuria= 85, renal function 15/1.03 with egfr 63 of , with UA showing ketones, protein glucose, high specific gravity, squamous epithelial cell, hyaline cast and few bacteria with no  burning urgency, fevers chills, flank pain at this time         *Preventative health  -Patient reports that she has received her shingles vaccine  -She reports that she has an appointment for colonoscopy, still awaiting a callback from them to schedule an appointment she will be calling them since she has not heard back from them for 3 weeks.                   Family History   Problem Relation Age of Onset    Stroke Mother         Patient states taht mother was way older than when she or her sister had stroked    Cancer Mother         States that it was somewhere near stomach which had already spread and she went to hospice upon diagnosis    Stroke Father     Stroke Sister 45        Paralysed neck below    No Known Problems Paternal Aunt       Social History     Socioeconomic History    Marital status:    Tobacco Use    Smoking status: Former     Current packs/day: 0.25     Average packs/day: 0.3 packs/day for 29.0 years (7.3 ttl pk-yrs)     Types: Cigarettes    Smokeless tobacco: Former    Tobacco comments:     tried it chewing tobacco but never continued use   Vaping Use    Vaping status: Never Used   Substance and Sexual Activity    Alcohol use: Yes     Comment: on special occasions    Drug use: Yes     Frequency: 7.0 times per week     Types: Marijuana, Methamphetamines     Comment: Marijuana daily, discontinued methamphetamine use          Patient Active Problem List    Diagnosis Date Noted    Hyperglycemia 07/03/2025    Right carpal tunnel syndrome 07/03/2025    Poor compliance with medication 07/03/2025    Sacral pain 02/25/2025    Class 3 severe obesity due to excess calories with body mass index (BMI) of 40.0 to 44.9 in adult 01/28/2025    Helicobacter pylori infection 03/29/2024    Impacted cerumen of right ear 03/29/2024    GERD (gastroesophageal reflux disease) 10/06/2023    Erythrocytosis 03/19/2023    Dyslipidemia 03/19/2023    Hot flashes 03/19/2023    CKD (chronic kidney disease) stage 2, GFR  60-89 ml/min 02/19/2023    History of CVA (cerebrovascular accident) 04/27/2022    Blurry vision, bilateral 04/19/2021    Substance abuse (HCC) 04/19/2021    Hypothyroidism 08/11/2020    Type 2 diabetes mellitus with nephropathy (HCC) 08/11/2020    Dizziness 08/02/2018    Obesity with body mass index 30 or greater 07/13/2017    HTN (hypertension) 04/17/2015       Current Outpatient Medications   Medication Sig Dispense Refill    insulin detemir (LEVEMIR FLEXTOUCH) 100 UNIT/ML injection PEN Inject 20 Units under the skin 2 times a day. 6 mL 0    Insulin Pen Needle 32 G x 4 mm (BD PEN NEEDLE LAURA 2ND GEN) USE TO INJECT INSULIN EVERY  Each 1    aspirin (ASA) 81 MG Chew Tab chewable tablet Chew 1 Tablet every day. CHEW AND SWALLOW 90 Tablet 1    Cholecalciferol (VITAMIN D) 50 MCG (2000 UT) Cap Take 1 Capsule by mouth every day. 90 Capsule 1    levothyroxine (SYNTHROID) 150 MCG Tab TAKE 1 TABLET BY MOUTH EVERY MORNING ON AN EMPTY STOMACH 90 Tablet 1    Blood Glucose Test Strips Test strips order: Test strips for One Touch Verio meter. Sig: use once and prn ssx high or low sugar #100 RF x 3 100 Strip 3    Blood Glucose Meter Kit Test blood sugar as recommended by provider. True Metrix blood glucose monitoring kit. 1 Kit 0    Lancets USE ONE LANCET TO TEST BLOOD SUGAR ONCE DAILY 100 Each 1    Alcohol Swabs Wipe site with prep pad prior to injection. 100 Each 1    dapagliflozin propanediol (FARXIGA) 10 MG Tab Take 1 Tablet by mouth every day. 90 Tablet 1    omeprazole (PRILOSEC) 20 MG delayed-release capsule Take 1 Capsule by mouth every day. 90 Capsule 1    metformin (GLUCOPHAGE) 1000 MG tablet TAKE 1 TABLET BY MOUTH TWICE DAILY WITH MEALS 180 Tablet 1    atorvastatin (LIPITOR) 40 MG Tab Take 1 Tablet by mouth every evening. 90 Tablet 1    lisinopril (PRINIVIL) 40 MG tablet Take 1 Tablet by mouth every day. 100 Tablet 3    glucose blood (ONETOUCH VERIO) strip TEST BLOOD SUGAR ONCE DAILY AND AS NEEDED FOR SYMPTOMS OF  HIGH OR LOW BLOOD SUGAR 100 Strip 1    Nutritional Supplements (GLUCOSE MANAGEMENT) Tab Take 4 Tablets by mouth as needed (for hypoglycemia). (Patient not taking: Reported on 7/16/2025) 15 Tablet 1     No current facility-administered medications for this visit.       ROS: As per HPI. Additional pertinent systems as noted below.  Constitutional:  Negative for fever, chills   Cardiovascular:   denies any  palpitations at this time  Respiratory:  Negative for cough, sputum production, . Positive as in hpi   Gastrointestinal: Negative for abdominal pain, nausea, vomiting, , or blood in stools . Positive as in hpi- constipation better   Genitourinary: Negative for dysuria, flank pain and hematuria. Positive for nocturia  Neurologic: Negative for headaches, , seizures, weakness . Positive for tingling and numbness of right 2 lateral fingers . Positive for dizziness  Endo/Heme/Allergies: Positive for  nocturia.  Denies any polyphagia. Positive for polyuria     LMP 03/30/2011     Physical Exam   Constitutional:   Not in acute distress       Note: I have reviewed all pertinent labs and diagnostic tests associated with this visit with specific comments listed under the assessment and plan below    Assessment and Plan    1. Type 2 diabetes mellitus with nephropathy (HCC)  Refer to HPI for details.  From restenting insulin to [facility] instead of [facility] given issues with shortage of insulin at that current pharmacy-refer to details about conversation with pharmacy under HPI.  Next-continue Farxiga, metformin.  Next-check sugars in the morning keep a log, follow-up in 2 weeks.  - Patient educated regarding importance of starting insulin as soon as possible to avoid DKA, other complications that are imminent.  - Patient expresses understanding.  Mild stable proteinuria, already established with nephrology.  - Started appropriately on SGLT2 inhibitor.  Tolerating well at this time.  Encouraged hydration  - Patient given  instructions on maintaining sugars between 100 120 if lower than 100 to back up by 5 units on glargine and if higher than 120, will see patient in 2 weeks and instruct on how to adjust insulin.    - Insulin Glargine-yfgn 100 UNIT/ML Solution Pen-injector; Inject 44 Units under the skin every evening. If not injecting 28 units at this time, then continue the current dose and titrate by 2 units every 2 days to maintain morning fasting sugar between 100-120. If < 80 please decrease by 5 units for next dose and contact Dr. White.  Dispense: 40 mL; Refill: 1    2. Hypothyroidism, unspecified type  Patient reports not taking levothyroxine in the morning on an empty stomach as she thought it was lisinopril that needed to be taken like that.  - Educated regarding this.  - Patient also likely needs higher dose however we will try to have patient take it regularly first while working on controlling sugars which may impact weight also.  - We will check thyroid functions in 6 weeks and decide whether to change dose or not.    - TSH WITH REFLEX TO FT4; Future    3. Vitamin D deficiency (Primary)  Prescribed vitamin D  - Cholecalciferol (VITAMIN D) 50 MCG (2000 UT) Cap; Take 1 Capsule by mouth every day.  Dispense: 90 Capsule; Refill: 1    4. History of CVA (cerebrovascular accident)  Aspirin refill placed.  - aspirin (ASA) 81 MG Chew Tab chewable tablet; Chew 1 Tablet every day. CHEW AND SWALLOW  Dispense: 90 Tablet; Refill: 1     Followup: No follow-ups on file.        Signed by: Kris White M.D.

## 2025-08-06 DIAGNOSIS — Z86.73 HISTORY OF CVA (CEREBROVASCULAR ACCIDENT): ICD-10-CM

## 2025-08-07 RX ORDER — HUMAN INSULIN 100 [IU]/ML
20 INJECTION, SUSPENSION SUBCUTANEOUS 2 TIMES DAILY
Qty: 6 ML | Refills: 0 | OUTPATIENT
Start: 2025-08-07

## 2025-08-15 ENCOUNTER — OFFICE VISIT (OUTPATIENT)
Dept: INTERNAL MEDICINE | Facility: OTHER | Age: 55
End: 2025-08-15
Payer: COMMERCIAL

## 2025-08-15 VITALS
HEART RATE: 74 BPM | HEIGHT: 62 IN | DIASTOLIC BLOOD PRESSURE: 87 MMHG | BODY MASS INDEX: 45.71 KG/M2 | SYSTOLIC BLOOD PRESSURE: 121 MMHG | OXYGEN SATURATION: 95 % | WEIGHT: 248.4 LBS | TEMPERATURE: 97.5 F

## 2025-08-15 DIAGNOSIS — E03.9 HYPOTHYROIDISM, UNSPECIFIED TYPE: ICD-10-CM

## 2025-08-15 DIAGNOSIS — E11.21 TYPE 2 DIABETES MELLITUS WITH NEPHROPATHY (HCC): ICD-10-CM

## 2025-08-15 PROCEDURE — 99214 OFFICE O/P EST MOD 30 MIN: CPT | Performed by: INTERNAL MEDICINE

## 2025-08-15 PROCEDURE — 3074F SYST BP LT 130 MM HG: CPT | Performed by: INTERNAL MEDICINE

## 2025-08-15 PROCEDURE — 3079F DIAST BP 80-89 MM HG: CPT | Performed by: INTERNAL MEDICINE

## 2025-08-15 RX ORDER — LEVOTHYROXINE SODIUM 150 UG/1
150 TABLET ORAL
Qty: 15 TABLET | Refills: 1 | Status: SHIPPED | OUTPATIENT
Start: 2025-08-15

## 2025-08-15 RX ORDER — DULAGLUTIDE 0.75 MG/.5ML
0.75 INJECTION, SOLUTION SUBCUTANEOUS
Qty: 6 ML | Refills: 1 | Status: SHIPPED | OUTPATIENT
Start: 2025-08-15

## 2025-08-15 ASSESSMENT — FIBROSIS 4 INDEX: FIB4 SCORE: 0.93

## 2025-08-21 ENCOUNTER — TELEPHONE (OUTPATIENT)
Dept: INTERNAL MEDICINE | Facility: OTHER | Age: 55
End: 2025-08-21
Payer: COMMERCIAL

## 2025-08-22 ENCOUNTER — HOSPITAL ENCOUNTER (OUTPATIENT)
Dept: LAB | Facility: MEDICAL CENTER | Age: 55
End: 2025-08-22
Attending: INTERNAL MEDICINE
Payer: COMMERCIAL

## 2025-08-22 DIAGNOSIS — E03.9 HYPOTHYROIDISM, UNSPECIFIED TYPE: ICD-10-CM

## 2025-08-22 LAB
T4 FREE SERPL-MCNC: 2 NG/DL (ref 0.93–1.7)
TSH SERPL DL<=0.005 MIU/L-ACNC: 0.3 UIU/ML (ref 0.38–5.33)

## 2025-08-22 PROCEDURE — 84443 ASSAY THYROID STIM HORMONE: CPT

## 2025-08-22 PROCEDURE — 36415 COLL VENOUS BLD VENIPUNCTURE: CPT

## 2025-08-22 PROCEDURE — 84439 ASSAY OF FREE THYROXINE: CPT

## 2025-08-27 DIAGNOSIS — E11.21 TYPE 2 DIABETES MELLITUS WITH NEPHROPATHY (HCC): ICD-10-CM

## 2025-08-28 RX ORDER — INSULIN GLARGINE-YFGN 100 [IU]/ML
48 INJECTION, SOLUTION SUBCUTANEOUS EVERY EVENING
Qty: 45 ML | Refills: 1 | Status: CANCELLED | OUTPATIENT
Start: 2025-08-28

## 2025-08-29 ENCOUNTER — OFFICE VISIT (OUTPATIENT)
Dept: INTERNAL MEDICINE | Facility: OTHER | Age: 55
End: 2025-08-29
Payer: COMMERCIAL

## 2025-08-29 VITALS
HEART RATE: 71 BPM | SYSTOLIC BLOOD PRESSURE: 158 MMHG | OXYGEN SATURATION: 96 % | DIASTOLIC BLOOD PRESSURE: 100 MMHG | BODY MASS INDEX: 46.45 KG/M2 | TEMPERATURE: 97.1 F | WEIGHT: 252.4 LBS | HEIGHT: 62 IN

## 2025-08-29 DIAGNOSIS — E11.21 TYPE 2 DIABETES MELLITUS WITH NEPHROPATHY (HCC): ICD-10-CM

## 2025-08-29 DIAGNOSIS — E03.9 HYPOTHYROIDISM, UNSPECIFIED TYPE: Primary | ICD-10-CM

## 2025-08-29 RX ORDER — INSULIN GLARGINE-YFGN 100 [IU]/ML
50 INJECTION, SOLUTION SUBCUTANEOUS EVERY EVENING
Qty: 45 ML | Refills: 1 | Status: SHIPPED | OUTPATIENT
Start: 2025-08-29

## 2025-08-29 RX ORDER — INSULIN GLARGINE-YFGN 100 [IU]/ML
50 INJECTION, SOLUTION SUBCUTANEOUS EVERY EVENING
Qty: 45 ML | Refills: 1 | Status: SHIPPED | OUTPATIENT
Start: 2025-08-29 | End: 2025-08-29

## 2025-08-29 ASSESSMENT — FIBROSIS 4 INDEX: FIB4 SCORE: 0.93
